# Patient Record
Sex: FEMALE | Race: WHITE | NOT HISPANIC OR LATINO | Employment: OTHER | URBAN - METROPOLITAN AREA
[De-identification: names, ages, dates, MRNs, and addresses within clinical notes are randomized per-mention and may not be internally consistent; named-entity substitution may affect disease eponyms.]

---

## 2018-02-19 ENCOUNTER — OFFICE VISIT CONVERTED (OUTPATIENT)
Dept: FAMILY MEDICINE CLINIC | Facility: CLINIC | Age: 53
End: 2018-02-19
Attending: NURSE PRACTITIONER

## 2018-10-03 ENCOUNTER — OFFICE VISIT CONVERTED (OUTPATIENT)
Dept: PULMONOLOGY | Facility: CLINIC | Age: 53
End: 2018-10-03
Attending: PHYSICIAN ASSISTANT

## 2020-02-03 ENCOUNTER — HOSPITAL ENCOUNTER (OUTPATIENT)
Dept: URGENT CARE | Facility: CLINIC | Age: 55
Discharge: HOME | End: 2020-02-03
Attending: FAMILY MEDICINE

## 2021-05-16 VITALS
HEART RATE: 80 BPM | RESPIRATION RATE: 16 BRPM | DIASTOLIC BLOOD PRESSURE: 78 MMHG | TEMPERATURE: 98 F | HEIGHT: 62 IN | SYSTOLIC BLOOD PRESSURE: 136 MMHG | OXYGEN SATURATION: 87 % | BODY MASS INDEX: 45.08 KG/M2 | WEIGHT: 245 LBS

## 2021-05-28 VITALS
TEMPERATURE: 99.1 F | DIASTOLIC BLOOD PRESSURE: 78 MMHG | SYSTOLIC BLOOD PRESSURE: 121 MMHG | HEART RATE: 72 BPM | HEIGHT: 63 IN | OXYGEN SATURATION: 93 % | RESPIRATION RATE: 16 BRPM | BODY MASS INDEX: 43.41 KG/M2 | WEIGHT: 245 LBS

## 2021-05-28 NOTE — PROGRESS NOTES
Patient: FABI IRAHETA     Acct: [id number]     Report: #[id number]  UNIT #: [id number]     : 1965    Encounter Date:10/03/2018  PRIMARY CARE: TIANA STROUD  ***Signed***  --------------------------------------------------------------------------------------------------------------------  Chief Complaint      Encounter Date      Oct 3, 2018            Primary Care Provider      TIANA STROUD            Referring Provider      TIANA STROUD            Patient Complaint      Patient is complaining of      [facility] discharge            VITALS      Height 5 ft 2.50 in / 158.75 cm      Weight 245 lbs 0 oz / 111.755434 kg      BSA 2.10 m2      BMI 44.1 kg/m2      Temperature 99.1 F / 37.28 C - Oral      Pulse 72      Respirations 16      Blood Pressure 121/78 Sitting, Right Arm      Pulse Oximetry 93%, 3 liters            HPI      The patient is a 53-year-old white female with a history of very severe COPD who    has recently had 2 back to back hospitalizations at [facility].     She was most recently seen by Dr. Infante and Dr. Kirby after presenting with     increased dyspnea, fevers, chills, and cough productive of yellow sputum. She     was treated with antibiotics and breathing treatments for a COPD exacerbation     and acute-on-chronic hypoxic and hypercapnic respiratory failure. She was     maintained on Trilogy at night. She has many allergies, many of which are     questionable including apparent allergies to steroids, many antibiotics, and     many inhalers and breathing treatments. She was eventually discharged to rehab     after refusing rehab after her previous hospitalization and tells me that she     has done fairly well. She is complaining of some mildly increased dyspnea and     coughing, but denies any purulent sputum production, denies any increased     wheezing, hemoptysis, or chills. She is only using DuoNeb q.4 hours and p.r.n.     albuterol. She was supposed to  be discharged on another week €™s worth of     ertapenem when she went to [facility], but the patient tells me she does not     know if she received that there or not. However, she did improve significantly     over the past several weeks.             I have reviewed her review of systems, medical, surgical, and family history and    agree with those as entered.            ROS      Constitutional:  Denies: Fatigue, Fever, Weight gain, Weight loss, Chills,     Insomnia, Other      Respiratory/Breathing:  Complains of: Shortness of air, Wheezing; Denies: Cough,    Hemoptysis, Pleuritic pain, Other      Endocrine:  Denies: Polydipsia, Polyuria, Heat/cold intolerance, Abnorml     menstrual pattern, Diabetes, Other      Eyes:  Denies: Blurred vision, Vision Changes, Other      Ears, nose, mouth, throat:  Denies: Mouth lesions, Thrush, Throat pain, Sarah    rseness, Allergies/Hay Fever, Post Nasal Drip, Headaches, Recent Head Injury,     Nose Bleeding, Neck Stiffness, Thyroid Mass, Hearing Loss, Ear Fullness, Dry     Mouth, Nasal or Sinus Pain, Dry Lips, Nasal discharge, Nasal congestion, Other      Cardiovascular:  Denies: Palpitations, Syncope, Claudication, Chest Pain, Wake     up Gasping for air, Leg Swelling, Irregular Heart Rate, Cyanosis, Dyspnea on     Exertion, Other      Gastrointestinal:  Denies: Nausea, Constipation, Diarrhea, Abdominal pain,     Vomiting, Difficulty Swallowing, Reflux/Heartburn, Dysphagia, Jaundice,     Bloating, Melena, Bloody stools, Other      Genitourinary:  Denies: Urinary frequency, Incontinence, Hematuria, Urgency,     Nocturia, Dysuria, Testicular problems, Other      Musculoskeletal:  Denies: Joint Pain, Joint Stiffness, Joint Swelling, Myalgias,    Other      Hematologic/lymphatic:  DENIES: Lymphadenopathy, Bruising, Bleeding tendencies,     Other      Neurological:  Denies: Headache, Numbness, Weakness, Seizures, Other      Psychiatric:  Denies: Anxiety, Appropriate Effect,  Depression, Other      Sleep:  No: Excessive daytime sleep, Morning Headache?, Snoring, Insomnia?, Stop    breathing at sleep?, Other      Integumentary:  Denies: Rash, Dry skin, Skin Warm to Touch, Other      Immunologic/Allergic:  Denies: Latex allergy, Seasonal allergies, Asthma,     Urticaria, Eczema, Other      Immunization status:  No: Up to date            FAMILY/SOCIAL/MEDICAL HX      Current History            Problems         Chronic Problems:         244.9 HYPOTHYROIDISM (Onset: 3/5/10, Acute)         OTHER MEDICAL PROVIDERS (Onset: 12/10/09, Chronic)              GI - AHMED--diarhea              NEURO-KENDRA              CARDIO-KILGORE              [facility]              HEMO-CHENEY         281.0 PERNICIOUS ANEMIA (Onset: 09)         401.9 HYPERTENSION NOS (Onset: 09, Chronic)         496 CHR AIRWAY OBSTRUCTION NEC (Onset: 09)      Surgical History:  Yes: Abdominal Surgery (DIAGNOSTIC LAPAROSCOPY), Bowel     Surgery (COLONOSCOPIES), Cholecystectomy (), Head Surgery (RIGHT     PARATHYROIDECTOMY), Orthopedic Surgery (RIGHT ROTATOR CUFF REPAIR ), Other     Surgeries; No: Appendectomy, Bladder Surgery, CABG, Oral Surgery, Vascular     Surgery      Stroke - Family Hx:  Mother, Grandparent      Heart - Family Hx:  Mother, Grandparent      Cancer/Type - Family Hx:  Mother, Father, Aunt      Other Family Medical History:  Child      Social History:  No Tobacco Use, No Alcohol Use, No Recreational Drug use      Smoking status:  Smoker, status unknown      Smoking history:  10-25 pack years (FORMER SMOKED 26-27 YRS)      Exercise Activity:  None      Whom do you live with?:  family       Section:  Yes (3)      Hysterectomy:  Yes (2001)      Anticoagulation Therapy:  No      Antibiotic Prophylaxis:  Yes      Medical History:  Yes: Anemia (PERNICIOUS), Arthritis, Asthma, Blood Disease (     PERNICIOUS ANEMIA), Chronic Bronchitis/COPD (REQUIRES CONT 2.5l O2), Congestive     Heart Failu,  Depression, Anxiety, Diabetes, Gall Stones, Hemorrhoids/Rectal Prob    (HIATAL HERNIA, gastroenteritis), Hiatal Hernia (HIATAL), High Blood Pressure,     Migrane Headaches, Mitral Valve Prolapse, Psychiatric Care, Shortness Of Breath     (lung capacity at 26%), Sinus Trouble, Skin Disease/Psoriais/Ecz, Thyroid     Problem; No: Chemotherapy/Cancer, Deafness or Ringing Ears, Seizures, Heart Att    ack, Night sweats, Miscellaneous Medical/oth            PREVENTION      Hx Influenza Vaccination:  No      Influenza Vaccine Declined:  Yes      2 or More Falls Past Year?:  Yes      Fall Past Year with Injury?:  Yes      Hx Pneumococcal Vaccination:  No (10- )      Encouraged to follow-up with:  PCP regarding preventative exams.      Chart initiated by      Maddy Van MA            ALLERGIES/MEDICATIONS      Allergies:        Coded Allergies:             BUDESONIDE (Verified  Allergy, Severe, chest pain, breathing compromised,     nauseous, 10/3/18)           FORMOTEROL FUMARATE (Verified  Allergy, Severe, chest pain, breathing     compromised, nauseous, 10/3/18)           IRON DEXTRAN COMPLEX (Verified  Allergy, Severe, RESP ARREST, 10/3/18)           OXYCODONE (Verified  Allergy, Severe, ITCHING/RASH, 10/3/18)           SULFAMETHOXAZOLE (Verified  Allergy, Severe, breathing compromised, chest     pain, nauseous, 10/3/18)           TRIMETHOPRIM (Verified  Allergy, Severe, breathing compromised, chest pain,    nauseous, 10/3/18)           HYDROCODONE (Verified  Allergy, Intermediate, RASH/ITCHING, 10/3/18)           ZAFIRLUKAST (Verified  Allergy, Unknown, 10/3/18)           CODEINE (Verified  Adverse Reaction, Severe, ITCHING/RASH, VOMITING,     10/3/18)           HYDROXYZINE (Verified  Adverse Reaction, Severe, ITCHING, 10/3/18)           VANCOMYCIN (Verified  Adverse Reaction, Severe, FLUSHING/ITCHING, 10/3/18)           PENICILLINS (Verified  Adverse Reaction, Intermediate, ITCHING/RASH,     VOMITING, 10/3/18)            ADHESIVE TAPE (Verified  Adverse Reaction, Mild, SKIN PEELS, 10/3/18)           CEPHALOSPORINS (Verified  Adverse Reaction, Mild, GI SYMPTOMS, RASH,     10/3/18)      Medications    Last Reconciled on 10/3/18 14:10 by KWADWO GUERRA      Neb-Budesonide (Pulmicort) 0.5 Mg/2 Ml Ampul.neb      0.5 MG INH BID, #60 NEB 4 Refills         Prov: Sindy Varghese PA-C         10/3/18       Arformoterol Tartrate (Brovana) 15 Mcg/2 Ml Vial.neb      15 MCG INH BID, #60 NEB 4 Refills         Prov: Sindy Varghese PA-C         10/3/18       MDI-Albuterol (Ventolin HFA*) 8 Gm Hfa.aer.ad      2 PUFFS INH Q4H PRN for SHORTNESS OF BREATH, #1 MDI 0 Refills         Reported         10/3/18       Insulin Glargine,Hum.rec.anlog (Basaglar Kwikpen U-100) 100 Unit/1 Ml Insuln.pen      5 UNITS SUBQ HS, #1 INSULN.PEN         Reported         10/3/18       Spironolactone (Spironolactone*) 25 Mg Tablet      25 MG PO BID, #60 TAB 0 Refills         Reported         10/3/18       Famotidine (Famotidine) 20 Mg Tablet      20 MG PO BID for 30 Days, #60 TAB         Prov: Michael Goncalves         9/4/18       Albuterol/Ipratropium (Duoneb) 3 Ml Ampul.neb      3 ML INH RTQ4H for 30 Days, #180 NEB         Prov: Michael Goncalves         9/4/18       Cyproheptadine HCl (Periactin) 4 Mg Tablet      8 MG PO BID, TAB         Reported         8/28/18       NEB-Albuterol Sulf (Albuterol) 2.5 Mg/0.5 Ml Vial.neb      2.5 MG INH Q4H PRN for SHORTNESS OF BREATH, #120 NEB 0 Refills         Reported         8/28/18       Cyanocobalamin (Vitamin B-12*) 500 Mcg Tablet      1000 MCG PO QDAY, TAB         Reported         8/28/18       Cholecalciferol (Vitamin D3*) 2,000 Unit Tablet      2000 UNITS PO QDAY, #30 TAB         Reported         8/28/18       Metformin Hcl* (Metformin Hcl*) 500 Mg Tablet      500 MG PO BID, #60 TAB 0 Refills         Reported         8/28/18       Spironolactone (Aldactone) 25 Mg Tablet      25 MG PO BID for 30 Days, #60 TAB         Prov:  Leia Woodson         8/20/18       Omega 3 Polyunsat Fatty Acids (Lovaza) 1 Gm Capsule      1 GM PO BID for 30 Days, #60 CAP         Prov: Leia Woodson         8/20/18       Butorphanol Tartrate (Stadol NS 1 MG/Spray) 10 Mg/1 Ml Spray      1 PUFF NARE ALT BID PRN for MIGRAINE, SPRAYS         Reported         8/10/18       Sertraline HCl (Sertraline*) 100 Mg Tablet      200 MG PO QDAY, #60 TAB 0 Refills         Reported         8/7/18       Atenolol (Atenolol*) 50 Mg Tablet      50 MG PO BID, #60 TAB 0 Refills         Reported         8/7/18       MDI-Albuterol (Ventolin HFA*) 18 Gm Hfa.aer.ad      2 PUFFS INH Q4H PRN for SHORTNESS OF BREATH, #1 INH 0 Refills         Reported         8/7/18       Lisinopril* (Lisinopril*) 10 Mg Tablet      10 MG PO QDAY, #15 TAB 0 Refills         Reported         2/6/15       Ipratropium Bromide Neb (Ipratropium Bromide Neb) 0.2 Mg/Ml Solution      0.5 MG INH RTQ6H, #120 NEB 0 Refills         Reported         2/6/15       Montelukast Sodium (Montelukast*) 10 Mg Tablet      10 MG PO QDAY, TAB         Reported         8/6/14       Multivitamins (Multiple Vitamin) 1 Tab Tablet      1 TAB PO QDAY, #30 TAB 0 Refills         Reported         8/6/14       Oxygen (OXYGEN)  Gas      2 L NS CONTINUOUS         Reported         5/20/13       Levothyroxine Sodium (Levoxyl*) 0.125 Mg Tablet      0.125 MG PO QDAY, #90 TAB         Prov: JUAN CARLOS POSADA         5/1/13       Aspirin EC (Aspirin EC*) 81 Mg Tabec      81 MG PO QDAY, #90 3 Refills         Prov: *MARIA M CALLOWAY [facility]         9/11/12       Divalproex Sodium (Depakote Sprinkle) 125 Mg Cap      375 MG PO BID         Reported         8/25/11       Acetamin/Butalbital/Caffeine (Fioricet*) 1 Tab Tablet      1 TAB PO Q6H PRN for MIGRAINE, TAB 0 Refills         Reported         11/27/09       Nitroglycerin (Nitroglycerin) 0.4 Mg Tablet      0.4 MG SL PRN, #1 TAB 0 Refills         Reported         11/27/09      Current Medications       Current Medications Reviewed 10/3/18            EXAM      CONSTITUTIONAL: Pleasant  normal conversant.      EYES : Pink conjunctive, no ptosis, PERRL.      ENMT : Nose and ears appear normal, normal dentition, mild posterior pharyngeal     wall erythema. Mallampati classification      Neck: Nontender, no masses, no thyromegaly, no nodules.      Resp : Lungs have moderately decreased breath sounds throughout without wheezes,    rhonchi, or crackles appreciated. Normal work of breathing noted.       CVS   : No carotid bruits, S1, S2 nl, RRR, no murmur, rubs or gallop, no     peripheral edema      Chest wall: Normal rise with inspiration, nontender on palpation      GI     : Abdomen soft, with no masses, no hepatosplenomegaly, no hernias, BS+      MSK   : Normal gait and station, no digital cyanosis or clubbing      Skin : No rashes, ulcerations or lesions, normal turgor and temperature      Neuro: CN II - XII intact, no sensory deficits, DTRs intact and symmetrical, no     motor weakness      Psych: Appropriate affect, A   Vtials      Vitals:             Height 5 ft 2.50 in / 158.75 cm           Weight 245 lbs 0 oz / 111.120938 kg           BSA 2.10 m2           BMI 44.1 kg/m2           Temperature 99.1 F / 37.28 C - Oral           Pulse 72           Respirations 16           Blood Pressure 121/78 Sitting, Right Arm           Pulse Oximetry 93%, 3 liters            REVIEW      Results Reviewed      PCCS Results Reviewed?:  Yes Prev Lab Results, Yes Prev Radiology Results, Yes     [facility]      Lab Results      I have personally reviewed previous lab work, imaging, and provider notes     including most recent pulmonary consultation, progress notes, and discharge     summary, as well as most recent chest imaging.            Assessment      COPD, very severe - J44.9            Recurrent pneumonia - J18.9            Chronic respiratory failure with hypoxia and hypercapnia - J96.11, J96.12            SOB  (shortness of breath) - R06.02            Noncompliance - Z91.19            Notes      New Medications      * Spironolactone (Spironolactone*) 25 MG TABLET: 25 MG PO BID #60      * INSULIN GLARGINE,HUM.REC.ANLOG (Basaglar Kwikpen U-100) 100 UNIT/1 ML       INSULN.PEN: 5 UNITS SUBQ HS #1      * MDI-Albuterol (Ventolin HFA*) 8 GM HFA.AER.AD: 2 PUFFS INH Q4H PRN SHORTNESS       OF BREATH #1      * ARFORMOTEROL TARTRATE (Brovana) 15 MCG/2 ML VIAL.NEB: 15 MCG INH BID #60         Instructions: DIAGNOSIS CODE REQUIRED PRIOR TO PRESCRIBING.      * Neb-Budesonide (Pulmicort) 0.5 MG/2 ML AMPUL.NEB: 0.5 MG INH BID #60         Instructions: DIAGNOSIS CODE REQUIRED PRIOR TO PRESCRIBING.      Discontinued Medications      * Furosemide 20 MG TABLET: 20 MG PO BID@09,17 30 Days #60      * INSULIN DETEMIR (Levemir VIAL*) 100 UNIT/1 ML VIAL: 8 UNITS SUBQ QDAY 30 Days       #1      * INSULIN LISPRO (HumaLOG VIAL*) 100 UNITS/ML VIAL: 4 UNITS SUBQ TID MEALS #1      * Ertapenem Sodium (Invanz) 1 GM VIAL: 1,000 MG IV ONCE 7 Days      New Diagnostics      * Chest 2 View, As Soon As Possible         Dx: COPD, very severe - J44.9      ASSESSMENT:      1. Very severe COPD with recent exacerbation, resolving.      2. Chronic hypoxic and hypercapnic respiratory failure on nightly Trilogy.      3. Chronic dyspnea.      4. Cough.      5. Anxiety.      6. Noncompliance with medications.      7. Chronic diastolic heart failure.            PLAN:       1. At this time, I will try to optimize the patient €™s breathing regimen by     placing her on Brovana and Pulmicort nebs b.i.d. in addition to her p.r.n.     DuoNeb and albuterol. She does have many questionable allergies to inhalers, but    tells me that she has not used Brovana and Pulmicort before. I would have     treated her with a short course of prednisone right now, given her mildly     increased coughing, but she declines that, saying that she cannot take any     prednisone or any type of  steroid.      2. I will check a chest x-ray now to ensure that she is not developing another     pneumonia. She should continue on her current diuretic regimen and as she does     not currently see a cardiologist for her diastolic heart failure, I have offered    to refer her to someone, but she declines this. She would like to research on     her own and find someone else to see as she was not happy seeing Dr. Adhikari in the    past.       3. I will have her followup with Dr. Infante at her request in the next 2 months     or sooner if needed.            Patient Education      Patient Education Provided:  COPD, How to use a Nebulizer      Time Spent:  > 50% /[facility]                 Disclaimer: Converted document may not contain table formatting or lab diagrams. Please see [employer] for the authenticated document.

## 2021-11-02 ENCOUNTER — OFFICE VISIT (OUTPATIENT)
Dept: PULMONOLOGY | Facility: CLINIC | Age: 56
End: 2021-11-02

## 2021-11-02 ENCOUNTER — TELEPHONE (OUTPATIENT)
Dept: PULMONOLOGY | Facility: CLINIC | Age: 56
End: 2021-11-02

## 2021-11-02 VITALS
TEMPERATURE: 98.7 F | WEIGHT: 210 LBS | RESPIRATION RATE: 16 BRPM | HEIGHT: 63 IN | BODY MASS INDEX: 37.21 KG/M2 | OXYGEN SATURATION: 96 %

## 2021-11-02 DIAGNOSIS — J96.11 CHRONIC RESPIRATORY FAILURE WITH HYPOXIA AND HYPERCAPNIA (HCC): ICD-10-CM

## 2021-11-02 DIAGNOSIS — J96.12 CHRONIC RESPIRATORY FAILURE WITH HYPOXIA AND HYPERCAPNIA (HCC): ICD-10-CM

## 2021-11-02 DIAGNOSIS — E66.9 OBESITY (BMI 30-39.9): ICD-10-CM

## 2021-11-02 DIAGNOSIS — J43.2 CENTRILOBULAR EMPHYSEMA (HCC): Primary | ICD-10-CM

## 2021-11-02 PROCEDURE — 99204 OFFICE O/P NEW MOD 45 MIN: CPT | Performed by: INTERNAL MEDICINE

## 2021-11-02 RX ORDER — ASPIRIN 81 MG/1
81 TABLET, CHEWABLE ORAL DAILY
COMMUNITY

## 2021-11-02 RX ORDER — ALBUTEROL SULFATE 90 UG/1
2 AEROSOL, METERED RESPIRATORY (INHALATION) EVERY 4 HOURS PRN
Qty: 18 G | Refills: 5 | Status: SHIPPED | OUTPATIENT
Start: 2021-11-02 | End: 2021-11-02 | Stop reason: SDUPTHER

## 2021-11-02 RX ORDER — ALPRAZOLAM 0.5 MG/1
1 TABLET ORAL 4 TIMES DAILY PRN
COMMUNITY
Start: 2021-08-20

## 2021-11-02 RX ORDER — ALENDRONATE SODIUM 70 MG/1
TABLET ORAL
COMMUNITY
End: 2022-11-18

## 2021-11-02 RX ORDER — BUSPIRONE HYDROCHLORIDE 5 MG/1
5 TABLET ORAL DAILY
COMMUNITY
Start: 2021-08-12 | End: 2022-11-18

## 2021-11-02 RX ORDER — SPIRONOLACTONE 25 MG/1
25 TABLET ORAL 2 TIMES DAILY
COMMUNITY
Start: 2021-07-02

## 2021-11-02 RX ORDER — ALBUTEROL SULFATE 90 UG/1
AEROSOL, METERED RESPIRATORY (INHALATION)
COMMUNITY
Start: 2021-09-07 | End: 2021-11-02 | Stop reason: ALTCHOICE

## 2021-11-02 RX ORDER — LEVOTHYROXINE SODIUM 0.12 MG/1
125 TABLET ORAL DAILY
COMMUNITY
Start: 2021-06-11

## 2021-11-02 RX ORDER — SERTRALINE HYDROCHLORIDE 100 MG/1
200 TABLET, FILM COATED ORAL DAILY
COMMUNITY
Start: 2021-08-10

## 2021-11-02 RX ORDER — TRIMETHOPRIM HYDROCHLORIDE 50 MG/5ML
10 SOLUTION ORAL
COMMUNITY
End: 2022-11-18

## 2021-11-02 RX ORDER — DOXYCYCLINE HYCLATE 100 MG/1
100 CAPSULE ORAL 2 TIMES DAILY
Qty: 20 CAPSULE | Refills: 0 | Status: SHIPPED | OUTPATIENT
Start: 2021-11-02 | End: 2022-11-04

## 2021-11-02 RX ORDER — FUROSEMIDE 20 MG/1
20 TABLET ORAL DAILY
COMMUNITY
Start: 2021-06-12

## 2021-11-02 RX ORDER — CYPROHEPTADINE HYDROCHLORIDE 4 MG/1
4 TABLET ORAL 2 TIMES DAILY
COMMUNITY

## 2021-11-02 RX ORDER — MONTELUKAST SODIUM 10 MG/1
10 TABLET ORAL DAILY
COMMUNITY
Start: 2021-06-21

## 2021-11-02 RX ORDER — DIVALPROEX SODIUM 125 MG/1
375 TABLET, DELAYED RELEASE ORAL 2 TIMES DAILY
COMMUNITY

## 2021-11-02 RX ORDER — ALBUTEROL SULFATE 90 UG/1
2 AEROSOL, METERED RESPIRATORY (INHALATION) EVERY 4 HOURS PRN
Qty: 1 G | Refills: 3 | Status: SHIPPED | OUTPATIENT
Start: 2021-11-02 | End: 2022-11-04

## 2021-11-02 RX ORDER — ATENOLOL 50 MG/1
50 TABLET ORAL 2 TIMES DAILY
COMMUNITY
Start: 2021-08-23

## 2021-11-02 RX ORDER — LISINOPRIL 10 MG/1
10 TABLET ORAL 3 TIMES DAILY PRN
COMMUNITY

## 2021-11-02 RX ORDER — CYCLOBENZAPRINE HCL 10 MG
TABLET ORAL
COMMUNITY
End: 2022-11-18

## 2021-11-02 RX ORDER — NITROGLYCERIN 0.4 MG/1
0.4 TABLET SUBLINGUAL
COMMUNITY
Start: 2021-08-13

## 2021-11-02 RX ORDER — IPRATROPIUM BROMIDE AND ALBUTEROL SULFATE 2.5; .5 MG/3ML; MG/3ML
3 SOLUTION RESPIRATORY (INHALATION) EVERY 4 HOURS
COMMUNITY
Start: 2021-09-04

## 2021-11-02 RX ORDER — INSULIN GLARGINE 100 [IU]/ML
INJECTION, SOLUTION SUBCUTANEOUS
COMMUNITY
Start: 2021-07-03 | End: 2022-11-18

## 2021-11-02 RX ORDER — ALBUTEROL SULFATE 90 UG/1
2 AEROSOL, METERED RESPIRATORY (INHALATION) EVERY 4 HOURS PRN
Qty: 1 G | Refills: 3 | Status: SHIPPED | OUTPATIENT
Start: 2021-11-02 | End: 2021-11-02 | Stop reason: SDUPTHER

## 2021-11-02 NOTE — PROGRESS NOTES
Primary Care Provider  Dana Banks APRN   Referring Provider  No ref. provider found      Patient Complaint  Establish Care (New Patient- COPD), Shortness of Breath, and Cough      Subjective          Nova Manuel presents to [facility]ULMON[facility]ARE [facility]ICINE      History of Presenting Illness  Nova Manuel is a 56 y.o. female here for follow-up for COPD and chronic respiratory failure.  She is a new patient and needs to reestablish care as she is last been seen about 38 months ago.  She was hospitalized for COPD exacerbation at that time and solace in the office.  She was supposed to follow-up with us as an outpatient and was using her trilogy machine at night.  Since then she is here to reestablish care.  She states she has not had a trilogy device in quite a while.  She had some issues with her home health services and ended up firing one of them and told them to take her trilogy machine with her.  Since then she has had worsening respiratory and CO2 retention symptoms and her current home health respiratory manager recommends that she go back on NIPPV.  She has morning headaches and excessive daytime sleepiness.  She is chronically on 2 to 3 L of oxygen.  She is short of breath walking 4 to 500 feet.  Her dyspnea is moderate severity, worse with activity and relieved with rest.  She has numerous medication allergies which makes giving her antibiotics and certain inhaler devices difficult.  She is currently only taking albuterol as needed up to a few times a day.  She is a former cigarette smoker.    She denies wheezing, headaches, chest pain, weight loss or hemoptysis. Denies fevers, chills and night sweats.  She is able to perform ADLs without difficulties and denies any swollen glands/lymph nodes in the head or neck.    I have personally reviewed the review of systems, past family, social, medical and surgical histories; and agree with their findings.      Review  of Systems  Constitutional symptoms:  Denied complaints   Ear, nose, throat: Denied complaints  Cardiovascular:  Denied complaints  Respiratory: Dyspnea, otherwise denied complaints  Gastrointestinal: Denied complaints  Musculoskeletal: Denied complaints        History reviewed. No pertinent family history.     Social History     Socioeconomic History   • Marital status: Single   Tobacco Use   • Smoking status: Former Smoker     Packs/day: 0.50     Years: 20.00     Pack years: 10.00     Quit date:      Years since quittin.8   • Smokeless tobacco: Never Used   Vaping Use   • Vaping Use: Never used   Substance and Sexual Activity   • Alcohol use: Defer   • Drug use: Defer   • Sexual activity: Defer        History reviewed. No pertinent past medical history.       There is no immunization history on file for this patient.      Allergies   Allergen Reactions   • Acetaminophen Unknown - High Severity   • Adhesive Tape Unknown - High Severity   • Baclofen Unknown - High Severity   • Budesonide-Formoterol Fumarate Unknown - High Severity   • Cephalexin Unknown - High Severity   • Cephalosporins Unknown - High Severity   • Codeine Unknown - High Severity   • Fluticasone-Salmeterol Unknown - High Severity   • Hydrocodone-Acetaminophen Unknown - High Severity   • Hydroxyzine Hcl Unknown - High Severity   • Iron Unknown - High Severity   • Oxycodone Unknown - High Severity   • Oxycodone-Acetaminophen Unknown - High Severity   • Penicillins Unknown - High Severity   • Prednisone Unknown - High Severity   • Sulfamethoxazole Unknown - High Severity   • Sulfamethoxazole-Trimethoprim Unknown - High Severity   • Tiotropium Bromide Monohydrate Unknown - Low Severity   • Trimethoprim Unknown - High Severity   • Vancomycin Unknown - High Severity   • Zafirlukast Unknown - High Severity          Current Outpatient Medications:   •  albuterol sulfate HFA (ProAir HFA) 108 (90 Base) MCG/ACT inhaler, , Disp: , Rfl:   •  ALPRAZolam  (XANAX) 0.5 MG tablet, Take 1 tablet by mouth 4 (Four) Times a Day As Needed., Disp: , Rfl:   •  ascorbic acid (VITAMIN C) 500 MG capsule controlled-release CR capsule, Vitamin C 500 mg oral capsule, extended release take 1 capsule by oral route daily   Active, Disp: , Rfl:   •  aspirin 81 MG chewable tablet, Chew 81 mg Daily., Disp: , Rfl:   •  atenolol (TENORMIN) 50 MG tablet, Take 50 mg by mouth., Disp: , Rfl:   •  Cholecalciferol 125 MCG (5000 UT) tablet, Vitamin D3 5,000 unit oral tablet take 1 tablet by oral route daily   Suspended, Disp: , Rfl:   •  cyclobenzaprine (FLEXERIL) 10 MG tablet, cyclobenzaprine 10 mg oral tablet take 1 tablet (10 mg) by oral route 3 times per day   Active, Disp: , Rfl:   •  cyproheptadine (PERIACTIN) 4 MG tablet, cyproheptadine 4 mg oral tablet take 2 tablets by oral route once a day (at bedtime)   Active, Disp: , Rfl:   •  furosemide (LASIX) 20 MG tablet, Take 20 mg by mouth Daily., Disp: , Rfl:   •  GARLIC PO, garlic 500 mg oral capsule take 1 capsule by oral route 4 times a day   Active, Disp: , Rfl:   •  Insulin Glargine (BASAGLAR KWIKPEN) 100 UNIT/ML injection pen, INJECT 13 UNITS SUBCUTANEOUS EVERY DAY, Disp: , Rfl:   •  ipratropium-albuterol (DUO-NEB) 0.5-2.5 mg/3 ml nebulizer, , Disp: , Rfl:   •  levothyroxine (SYNTHROID, LEVOTHROID) 125 MCG tablet, Take 125 mcg by mouth Daily., Disp: , Rfl:   •  lisinopril (PRINIVIL,ZESTRIL) 10 MG tablet, Take 10 mg by mouth Daily., Disp: , Rfl:   •  montelukast (SINGULAIR) 10 MG tablet, Take 10 mg by mouth Daily., Disp: , Rfl:   •  nitroglycerin (NITROSTAT) 0.4 MG SL tablet, ONE TABLET UNDER TONGUE AS NEEDED FOR CHEST PAIN, Disp: , Rfl:   •  sertraline (ZOLOFT) 100 MG tablet, Take 200 mg by mouth Daily., Disp: , Rfl:   •  spironolactone (ALDACTONE) 25 MG tablet, Take 25 mg by mouth., Disp: , Rfl:   •  albuterol sulfate  (90 Base) MCG/ACT inhaler, Inhale 2 puffs Every 4 (Four) Hours As Needed for Wheezing., Disp: 18 g, Rfl: 5  •   "alendronate (Fosamax) 70 MG tablet, Fosamax 70 mg oral tablet take 1 tablet (70 mg) by oral route once weekly in the morning, at least 30 min before first food, beverage, or medication of day   Suspended, Disp: , Rfl:   •  busPIRone (BUSPAR) 5 MG tablet, Take 5 mg by mouth Daily., Disp: , Rfl:   •  divalproex (Depakote) 125 MG DR tablet, Depakote 125 mg oral tablet,delayed release (DR/EC) take 2 tablets (250 mg) by oral route 2 times per day   Active, Disp: , Rfl:   •  doxycycline (VIBRAMYCIN) 100 MG capsule, Take 1 capsule by mouth 2 (Two) Times a Day., Disp: 20 capsule, Rfl: 0  •  POTASSIUM PO, potassium 99 mg oral tablet take 1 tablet by oral route daily   Suspended, Disp: , Rfl:   •  tiotropium (Spiriva HandiHaler) 18 MCG per inhalation capsule, , Disp: , Rfl:   •  Trimethoprim HCl (Primsol) 50 MG/5ML solution, 10 mL., Disp: , Rfl:          Objective     Vital Signs:   Temp 98.7 °F (37.1 °C) (Tympanic)   Resp 16   Ht 158.8 cm (62.5\")   Wt 95.3 kg (210 lb)   SpO2 96%   BMI 37.80 kg/m²     Physical Exam  Vital Signs Reviewed   WDWN, Alert, NAD.    HEENT:  PERRL, EOMI.  OP, nares clear  Neck:  Supple, no JVD, no thyromegaly  Chest:  good aeration, barrel chested, clear to auscultation bilaterally, tympanic to percussion bilaterally, no work of breathing noted  CV: RRR, no MGR, pulses 2+, equal.  Abd:  Soft, NT, ND, + BS, no HSM, obese  EXT:  no clubbing, no cyanosis, no edema  Neuro:  A&Ox3, CN grossly intact, no focal deficits.  Skin: No rashes or lesions noted       Result Review :   I have personally reviewed our last office note back in October 2018.  Also personally labs showing chronic hypercapnic respiratory failure.  Labs were also personally reviewed showing no peripheral eosinophilia.  I did personally reviewed her last chest x-ray which does show findings consistent with COPD.             Assessment and Plan      Patient Active Problem List   Diagnosis   • Centrilobular emphysema (HCC)   • Chronic " respiratory failure with hypoxia and hypercapnia (HCC)           Impression:  Very severe COPD without exacerbation.  Gold stage C symptoms.  Has numerous inhaler allergies and is currently only taking albuterol.  COPD assessment does score is 14 signifying borderline poor disease control  Chronic hypoxemic and hypercapnic respiratory failure on 2 to 3 L of oxygen.  Was wearing trilogy in the past and would like to go back on it.  She has symptoms of chronic hypercapnia would benefit from NIPPV nightly.  Chronic dyspnea at baseline  Tobacco use of cigarettes in remission  Obesity BMI 37.8    Plan:  Check ABG and full pulmonary function test  Patient certainly does qualify for NIPPV given history of chronic hypoxemic and hypercapnic respiratory failure, very severe COPD and symptoms of CO2 retention.  We will arrange NIPPV through her DME.  Patient will require noninvasive positive pressure ventilation for COPD and chronic respiratory failure.  Alternative modes of ventilation are insufficient due to persistent hypercapnia despite BiPAP use.  This patient requires frequent durations of ventilatory support with battery back-up and continuous enhanced along monitoring.  Intermittent support will be insufficient.  This patient quickly deteriorates without noninvasive ventilation.  Removal of ventilator may cause serious harm to the patient or frequent readmissions to the hospital.  Continue albuterol as needed as well as Spiriva HandiHaler.  Multiple nebulizer, inhaled and antibiotic allergies noted and discussed with patient  Continue home oxygen to keep SPO2 between 88-92%  Smoking status: Former smoker.  Does not need eligibility for lung cancer screening  Vaccination status: Declines vaccinations for flu, Prevnar, Pneumovax and COVID-19  Medications personally reviewed    Follow Up   Return in about 6 months (around 5/2/2022).  Patient was given instructions and counseling regarding her condition or for health  maintenance advice. Please see specific information pulled into the AVS if appropriate.     Electronically signed by Donte Mcqueen MD, 11/02/21, 1:09 PM EDT.

## 2021-11-03 DIAGNOSIS — J96.12 CHRONIC RESPIRATORY FAILURE WITH HYPOXIA AND HYPERCAPNIA (HCC): ICD-10-CM

## 2021-11-03 DIAGNOSIS — J43.2 CENTRILOBULAR EMPHYSEMA (HCC): Primary | ICD-10-CM

## 2021-11-03 DIAGNOSIS — J96.11 CHRONIC RESPIRATORY FAILURE WITH HYPOXIA AND HYPERCAPNIA (HCC): ICD-10-CM

## 2021-11-08 ENCOUNTER — HOSPITAL ENCOUNTER (OUTPATIENT)
Dept: RESPIRATORY THERAPY | Facility: HOSPITAL | Age: 56
End: 2021-11-08

## 2021-11-22 ENCOUNTER — HOSPITAL ENCOUNTER (OUTPATIENT)
Dept: RESPIRATORY THERAPY | Facility: HOSPITAL | Age: 56
Discharge: HOME | End: 2021-11-22
Admitting: INTERNAL MEDICINE

## 2021-11-22 DIAGNOSIS — J96.12 CHRONIC RESPIRATORY FAILURE WITH HYPOXIA AND HYPERCAPNIA (HCC): ICD-10-CM

## 2021-11-22 DIAGNOSIS — J96.11 CHRONIC RESPIRATORY FAILURE WITH HYPOXIA AND HYPERCAPNIA (HCC): ICD-10-CM

## 2021-11-22 DIAGNOSIS — J43.2 CENTRILOBULAR EMPHYSEMA (HCC): ICD-10-CM

## 2021-11-22 PROCEDURE — 94726 PLETHYSMOGRAPHY LUNG VOLUMES: CPT | Performed by: INTERNAL MEDICINE

## 2021-11-22 PROCEDURE — 94729 DIFFUSING CAPACITY: CPT | Performed by: INTERNAL MEDICINE

## 2021-11-22 PROCEDURE — 94726 PLETHYSMOGRAPHY LUNG VOLUMES: CPT

## 2021-11-22 PROCEDURE — 94060 EVALUATION OF WHEEZING: CPT

## 2021-11-22 PROCEDURE — 94729 DIFFUSING CAPACITY: CPT

## 2021-11-22 PROCEDURE — 94060 EVALUATION OF WHEEZING: CPT | Performed by: INTERNAL MEDICINE

## 2021-11-22 RX ORDER — ALBUTEROL SULFATE 2.5 MG/3ML
2.5 SOLUTION RESPIRATORY (INHALATION) ONCE
Status: COMPLETED | OUTPATIENT
Start: 2021-11-22 | End: 2021-11-22

## 2021-11-22 RX ADMIN — ALBUTEROL SULFATE 2.5 MG: 2.5 SOLUTION RESPIRATORY (INHALATION) at 09:47

## 2021-11-29 ENCOUNTER — TELEPHONE (OUTPATIENT)
Dept: PULMONOLOGY | Facility: CLINIC | Age: 56
End: 2021-11-29

## 2021-11-29 NOTE — TELEPHONE ENCOUNTER
PT IS NEEDING A CALL BACK ABOUT THE TESTING THAT WAS DONE ALSO  WAS GOING TO PUT HER ON A Abine MACHINE.

## 2021-12-16 ENCOUNTER — TELEPHONE (OUTPATIENT)
Dept: PULMONOLOGY | Facility: CLINIC | Age: 56
End: 2021-12-16

## 2021-12-16 NOTE — TELEPHONE ENCOUNTER
pt called wanting to knowsomething about  trilogy machine and something else reguading blood gas, pt is kind of hard to understand, please call pt back

## 2021-12-30 ENCOUNTER — LAB (OUTPATIENT)
Dept: LAB | Facility: HOSPITAL | Age: 56
End: 2021-12-30

## 2021-12-30 ENCOUNTER — TELEPHONE (OUTPATIENT)
Dept: PULMONOLOGY | Facility: CLINIC | Age: 56
End: 2021-12-30

## 2021-12-30 DIAGNOSIS — J96.11 CHRONIC RESPIRATORY FAILURE WITH HYPOXIA AND HYPERCAPNIA: ICD-10-CM

## 2021-12-30 DIAGNOSIS — J43.2 CENTRILOBULAR EMPHYSEMA: ICD-10-CM

## 2021-12-30 DIAGNOSIS — J96.12 CHRONIC RESPIRATORY FAILURE WITH HYPOXIA AND HYPERCAPNIA: ICD-10-CM

## 2021-12-30 LAB
ARTERIAL PATENCY WRIST A: POSITIVE
BASE EXCESS BLDA CALC-SCNC: 7.4 MMOL/L (ref -2–2)
BDY SITE: ABNORMAL
COHGB MFR BLD: 0.3 % (ref 0–1.5)
FHHB: 5.7 % (ref 0–5)
GAS FLOW AIRWAY: 2.5 LPM
HCO3 BLDA-SCNC: 36.1 MMOL/L (ref 22–26)
HGB BLDA-MCNC: 13.8 G/DL (ref 11.7–14.6)
INHALED O2 CONCENTRATION: 34 %
METHGB BLD QL: 0.1 % (ref 0–1.5)
MODALITY: ABNORMAL
OXYHGB MFR BLDV: 93.9 % (ref 94–99)
PCO2 BLDA: 71.1 MM HG (ref 35–45)
PH BLDA: 7.32 PH UNITS (ref 7.35–7.45)
PO2 BLD: 221 MM[HG] (ref 0–500)
PO2 BLDA: 75.2 MM HG (ref 80–100)
SAO2 % BLDCOA: 94.3 % (ref 95–99)

## 2021-12-30 PROCEDURE — 83050 HGB METHEMOGLOBIN QUAN: CPT

## 2021-12-30 PROCEDURE — 82375 ASSAY CARBOXYHB QUANT: CPT

## 2021-12-30 PROCEDURE — 36600 WITHDRAWAL OF ARTERIAL BLOOD: CPT

## 2021-12-30 PROCEDURE — 82805 BLOOD GASES W/O2 SATURATION: CPT

## 2022-01-05 ENCOUNTER — TELEPHONE (OUTPATIENT)
Dept: PULMONOLOGY | Facility: CLINIC | Age: 57
End: 2022-01-05

## 2022-01-05 NOTE — TELEPHONE ENCOUNTER
pt called wanting the results of her bloodgas and sent to [facility] from where pt gets O2 and medical equipment, att Luis Antonio [phone] office number

## 2022-02-21 ENCOUNTER — APPOINTMENT (OUTPATIENT)
Dept: RESPIRATORY THERAPY | Facility: HOSPITAL | Age: 57
End: 2022-02-21

## 2022-02-28 ENCOUNTER — TELEPHONE (OUTPATIENT)
Dept: PULMONOLOGY | Facility: CLINIC | Age: 57
End: 2022-02-28

## 2022-02-28 NOTE — TELEPHONE ENCOUNTER
Patient is needing a call back regarding her electricity for [employer] due to her oxygen if the power goes out. I sent her back to your phone but she also wanted me to send you a message, just incase you were not able to answer.

## 2022-11-03 NOTE — PROGRESS NOTES
Primary Care Provider  Dana Banks APRN   Referring Provider  No ref. provider found    Patient Complaint  Follow-up and Emphysema (Oxygen 2.5 liter nc)      SUBJECTIVE    History of Presenting Illness  Nova Manuel is a pleasant 57 y.o. female who presents to [facility]ULMON[facility] CRIT[facility]ARE MEDICINE for 1 year follow-up.  Patient is here with a friend today.  Patient has not been here since 11/2/2021 when she last saw Dr. Mcqueen.  Patient states she needs to recertify for her oxygen.  Patient does have a diagnosis of centrilobular emphysema, chronic respiratory failure hypoxia and hypercapnia.  Patient is on a NIPPV at night.  She is on O2 at 3 L via nasal cannula at this time.  Her saturations 92%.  Patient has multiple allergies and states that she cannot take many of the nebulizer inhalers due to allergic reactions.  Currently she is on DuoNeb and albuterol.  Patient states she gets these through her PCP which is [facility] which makes home visits once a month with her.  Patient states she has had recent x-rays done at [facility].  Her last pulmonary function test was 11/22/2021.  Patient states she gets very short of air with any exertion.  Patient also states that she is under the the services of hospice palliative care.  Patient is a former smoker and she refuses COVID flu and pneumonia vaccines at this time.  Patient does have a cough occasionally with production of white thick sputum and occasional asthma intermittent wheezing.  Patient states she will use her nebulizers.  She states she is not having any headaches, chest pain, weight loss or hemoptysis. Denies fevers, chills and night sweats. Nova Manuel is able to perform ADLs without difficulties and denies any swollen glands/lymph nodes in the head or neck.    I have personally reviewed the review of systems, past family, social, medical and surgical histories; and agree with their findings.    Review of Systems    Constitutional: Positive for fatigue. Negative for chills, diaphoresis, fever and unexpected weight change.   HENT: Negative.    Eyes: Negative.    Respiratory: Positive for cough, shortness of breath and wheezing.    Cardiovascular: Positive for leg swelling. Negative for chest pain and palpitations.   Musculoskeletal: Negative.         Does ambulate very little as she gets very short of air   Skin: Negative.         History reviewed. No pertinent family history.     Social History     Socioeconomic History   • Marital status: Single   Tobacco Use   • Smoking status: Former     Packs/day: 0.50     Years: 20.00     Pack years: 10.00     Types: Cigarettes     Quit date: 2007     Years since quitting: 15.8   • Smokeless tobacco: Never   Vaping Use   • Vaping Use: Never used   Substance and Sexual Activity   • Alcohol use: Defer   • Drug use: Defer   • Sexual activity: Defer        History reviewed. No pertinent past medical history.       There is no immunization history on file for this patient.    Allergies   Allergen Reactions   • Acetaminophen Unknown - High Severity   • Adhesive Tape Unknown - High Severity   • Baclofen Unknown - High Severity   • Budesonide-Formoterol Fumarate Unknown - High Severity   • Cephalexin Unknown - High Severity   • Cephalosporins Unknown - High Severity   • Codeine Unknown - High Severity   • Fluticasone-Salmeterol Unknown - High Severity   • Hydrocodone-Acetaminophen Unknown - High Severity   • Hydroxyzine Hcl Unknown - High Severity   • Iron Unknown - High Severity   • Oxycodone Unknown - High Severity   • Oxycodone-Acetaminophen Unknown - High Severity   • Penicillins Unknown - High Severity   • Prednisone Unknown - High Severity   • Sulfamethoxazole Unknown - High Severity   • Sulfamethoxazole-Trimethoprim Unknown - High Severity   • Tiotropium Bromide Monohydrate Unknown - Low Severity   • Trimethoprim Unknown - High Severity   • Vancomycin Unknown - High Severity   •  Zafirlukast Unknown - High Severity          Current Outpatient Medications:   •  alendronate (FOSAMAX) 70 MG tablet, Fosamax 70 mg oral tablet take 1 tablet (70 mg) by oral route once weekly in the morning, at least 30 min before first food, beverage, or medication of day   Suspended, Disp: , Rfl:   •  ALPRAZolam (XANAX) 0.5 MG tablet, Take 1 tablet by mouth 4 (Four) Times a Day As Needed., Disp: , Rfl:   •  ascorbic acid (VITAMIN C) 500 MG capsule controlled-release CR capsule, Vitamin C 500 mg oral capsule, extended release take 1 capsule by oral route daily   Active, Disp: , Rfl:   •  aspirin 81 MG chewable tablet, Chew 81 mg Daily., Disp: , Rfl:   •  atenolol (TENORMIN) 50 MG tablet, Take 50 mg by mouth., Disp: , Rfl:   •  Cholecalciferol 125 MCG (5000 UT) tablet, Vitamin D3 5,000 unit oral tablet take 1 tablet by oral route daily   Suspended, Disp: , Rfl:   •  cyproheptadine (PERIACTIN) 4 MG tablet, cyproheptadine 4 mg oral tablet take 2 tablets by oral route once a day (at bedtime)   Active, Disp: , Rfl:   •  divalproex (DEPAKOTE) 125 MG DR tablet, Depakote 125 mg oral tablet,delayed release (DR/EC) take 2 tablets (250 mg) by oral route 2 times per day   Active, Disp: , Rfl:   •  FREESTYLE LITE test strip, USE AS DIRECTED TO TEST BLOOD SUGAR TWICE DAILY, Disp: , Rfl:   •  furosemide (LASIX) 20 MG tablet, Take 20 mg by mouth Daily., Disp: , Rfl:   •  GARLIC PO, garlic 500 mg oral capsule take 1 capsule by oral route 4 times a day   Active, Disp: , Rfl:   •  ipratropium-albuterol (DUO-NEB) 0.5-2.5 mg/3 ml nebulizer, , Disp: , Rfl:   •  Lancets (freestyle) lancets, USE AS DIRECTED TO TEST BLOOD SUGAR TWICE DAILY, Disp: , Rfl:   •  Lantus SoloStar 100 UNIT/ML injection pen, , Disp: , Rfl:   •  levothyroxine (SYNTHROID, LEVOTHROID) 125 MCG tablet, Take 125 mcg by mouth Daily., Disp: , Rfl:   •  lisinopril (PRINIVIL,ZESTRIL) 10 MG tablet, Take 10 mg by mouth Daily., Disp: , Rfl:   •  montelukast (SINGULAIR) 10 MG  "tablet, Take 10 mg by mouth Daily., Disp: , Rfl:   •  nitroglycerin (NITROSTAT) 0.4 MG SL tablet, ONE TABLET UNDER TONGUE AS NEEDED FOR CHEST PAIN, Disp: , Rfl:   •  sertraline (ZOLOFT) 100 MG tablet, Take 200 mg by mouth Daily., Disp: , Rfl:   •  spironolactone (ALDACTONE) 25 MG tablet, Take 25 mg by mouth., Disp: , Rfl:   •  B-D ULTRAFINE III SHORT PEN 31G X 8 MM misc, USE TO INJECT BASAGLAR EVERY DAY, Disp: , Rfl:   •  busPIRone (BUSPAR) 5 MG tablet, Take 5 mg by mouth Daily., Disp: , Rfl:   •  cyclobenzaprine (FLEXERIL) 10 MG tablet, cyclobenzaprine 10 mg oral tablet take 1 tablet (10 mg) by oral route 3 times per day   Active, Disp: , Rfl:   •  Insulin Glargine (BASAGLAR KWIKPEN) 100 UNIT/ML injection pen, INJECT 13 UNITS SUBCUTANEOUS EVERY DAY, Disp: , Rfl:   •  POTASSIUM PO, potassium 99 mg oral tablet take 1 tablet by oral route daily   Suspended, Disp: , Rfl:   •  Trimethoprim HCl (Primsol) 50 MG/5ML solution, 10 mL., Disp: , Rfl:        OBJECTIVE    Vital Signs   BP 99/44 (BP Location: Right arm, Patient Position: Sitting, Cuff Size: Adult)   Pulse 68   Temp 97.8 °F (36.6 °C) (Tympanic)   Resp 18   Ht 158.8 cm (62.5\")   Wt 96.8 kg (213 lb 6.4 oz)   SpO2 92% Comment: 2.5 liter nc  BMI 38.41 kg/m²     Physical Exam  Vitals reviewed.   Constitutional:       General: She is not in acute distress.     Appearance: Normal appearance. She is obese. She is ill-appearing.   HENT:      Head: Normocephalic and atraumatic.      Nose: Nose normal.      Mouth/Throat:      Mouth: Mucous membranes are moist.      Pharynx: Oropharynx is clear.   Eyes:      Extraocular Movements: Extraocular movements intact.      Conjunctiva/sclera: Conjunctivae normal.      Pupils: Pupils are equal, round, and reactive to light.   Cardiovascular:      Rate and Rhythm: Normal rate and regular rhythm.      Pulses: Normal pulses.      Heart sounds: Normal heart sounds.   Pulmonary:      Effort: Pulmonary effort is normal. No " respiratory distress.      Breath sounds: Wheezing and rhonchi present.      Comments: Very diminished breath sounds bilateral lung fields with an occasional wheeze and rhonchi in the bases.  Abdominal:      General: Bowel sounds are normal.   Musculoskeletal:         General: Normal range of motion.      Cervical back: Normal range of motion and neck supple.      Right lower leg: Edema present.      Left lower leg: Edema present.   Lymphadenopathy:      Cervical: No cervical adenopathy.   Skin:     General: Skin is warm and dry.   Neurological:      General: No focal deficit present.      Mental Status: She is alert and oriented to person, place, and time.   Psychiatric:         Behavior: Behavior normal.          Results Review  I have personally reviewed the prior office note of Dr. Mcqueen and pulmonary function test from 11/22/2021 with the following:      Full Pulmonary Function Test With Bronchodilator: Patient Communication     Release   Not seen     Results  Full Pulmonary Function Test With Bronchodilator (Order [id number])  Order-Level Documents:    Scan on 11/22/2021 by Donte Mcqueen MD: PULMONARY FUNCTION TEST, [facility], 11/22/2021         Author: -- Service: -- Author Type: --   Filed:  Date of Service:  Creation Time:    Status: (Other)   Pulmonary function test     Spirometry:  Very severe obstructive lung defect noted.  FEV1 is 0.34 L / 13% predicted. No bronchodilator response was noted.  Flow volume loop shows mixed defect.     Lung volumes:  Very severe restrictive lung defect noted. Total lung capacity 1.13 L / 23% predicted. Lung volumes done via nitrogen washout testing.     Diffusion capacity:  Diffuse capacity severely reduced at 8% predicted     Overall impression:  Very severe mixed obstructive restrictive lung defect. No bronchodilator response noted. Diffuse capacity severely reduced. Compared to testing done in April 2016, FEV1 has severely decreased by 34% and FVC has severely  decreased by 29%. Diffuse capacity severely decreased by 75%.     Electronically signed by Donte Mcqueen MD, 11/29/21, 7:57 AM EST.           Order   Full Pulmonary Function Test With Bronchodilator (Order # [id number]) on 11/03/2021   View Encounter        Clinical Indications     ICD-10-CM ICD-9-CM   Centrilobular emphysema (HCC)     J43.2 492.8   Chronic respiratory failure with hypoxia and hypercapnia (HCC)     J96.11  J96.12 518.83  799.02  786.09     Signed    Electronically signed by Donte Mcqueen MD on 11/29/21 at 0757 EST     Additional Information    Specimen ID Bill Type Client ID   [id number]              Specimen Date Taken Specimen Time Taken Specimen Received Date Specimen Received Time Result Date Result Time       Nov 29, 2021  7:55 AM     Results Routing Tracking    View Results Routing Information     Order Report     Order Details      ASSESSMENT & PLAN    Patient Active Problem List   Diagnosis   • Centrilobular emphysema (HCC)   • Chronic respiratory failure with hypoxia and hypercapnia (HCC)       Diagnoses and all orders for this visit:    1. Centrilobular emphysema (HCC) (Primary)  -     6 Minute Walk Test; Future    2. Chronic respiratory failure with hypoxia and hypercapnia (HCC)  -     6 Minute Walk Test; Future    3. Obesity (BMI 30-39.9)           Plan:  6-minute walk performed in office today with patient failing within 2 minutes saturation dropping to 88%. Will recertify patient for oxygen today.  Patient to continue with her NIPPV settings as recommended.  Patient will follow-up in 6 months or sooner if needed.    Smoking status:former  Vaccination status:refused  Medications personally reviewed    Follow Up  Return in about 6 months (around 5/4/2023).    Patient was given instructions and counseling regarding her condition or for health maintenance advice. Please see specific information pulled into the AVS if appropriate.

## 2022-11-04 ENCOUNTER — OFFICE VISIT (OUTPATIENT)
Dept: PULMONOLOGY | Facility: CLINIC | Age: 57
End: 2022-11-04

## 2022-11-04 VITALS
BODY MASS INDEX: 37.81 KG/M2 | TEMPERATURE: 97.8 F | HEIGHT: 63 IN | WEIGHT: 213.4 LBS | HEART RATE: 68 BPM | SYSTOLIC BLOOD PRESSURE: 99 MMHG | OXYGEN SATURATION: 92 % | RESPIRATION RATE: 18 BRPM | DIASTOLIC BLOOD PRESSURE: 44 MMHG

## 2022-11-04 DIAGNOSIS — J96.11 CHRONIC RESPIRATORY FAILURE WITH HYPOXIA AND HYPERCAPNIA: ICD-10-CM

## 2022-11-04 DIAGNOSIS — E66.9 OBESITY (BMI 30-39.9): ICD-10-CM

## 2022-11-04 DIAGNOSIS — J43.2 CENTRILOBULAR EMPHYSEMA: Primary | ICD-10-CM

## 2022-11-04 DIAGNOSIS — J96.12 CHRONIC RESPIRATORY FAILURE WITH HYPOXIA AND HYPERCAPNIA: ICD-10-CM

## 2022-11-04 PROCEDURE — 99213 OFFICE O/P EST LOW 20 MIN: CPT | Performed by: NURSE PRACTITIONER

## 2022-11-04 RX ORDER — BLOOD-GLUCOSE METER
KIT MISCELLANEOUS
COMMUNITY
Start: 2022-10-12 | End: 2022-11-18

## 2022-11-04 RX ORDER — INSULIN GLARGINE 100 [IU]/ML
13 INJECTION, SOLUTION SUBCUTANEOUS DAILY
COMMUNITY
Start: 2022-11-02

## 2022-11-04 RX ORDER — PEN NEEDLE, DIABETIC 31 GX5/16"
NEEDLE, DISPOSABLE MISCELLANEOUS
COMMUNITY
Start: 2022-08-24 | End: 2022-11-18

## 2022-11-04 RX ORDER — LANCETS 28 GAUGE
EACH MISCELLANEOUS
COMMUNITY
Start: 2022-08-30 | End: 2022-11-18

## 2022-11-07 ENCOUNTER — TELEPHONE (OUTPATIENT)
Dept: PULMONOLOGY | Facility: CLINIC | Age: 57
End: 2022-11-07

## 2022-11-07 NOTE — TELEPHONE ENCOUNTER
Received a message from [employer] with [employer] (?) regarding patient and a prescription for oxygen. Please call [employer] back at [phone].

## 2022-11-09 NOTE — TELEPHONE ENCOUNTER
This number is not in service.  I tried calling the pt aids number I have and there is now one there by that name.

## 2022-11-18 ENCOUNTER — HOSPITAL ENCOUNTER (INPATIENT)
Facility: HOSPITAL | Age: 57
LOS: 25 days | Discharge: HOME | End: 2022-12-13
Attending: EMERGENCY MEDICINE

## 2022-11-18 ENCOUNTER — APPOINTMENT (OUTPATIENT)
Dept: GENERAL RADIOLOGY | Facility: HOSPITAL | Age: 57
End: 2022-11-18

## 2022-11-18 DIAGNOSIS — J96.22 ACUTE ON CHRONIC RESPIRATORY FAILURE WITH HYPOXIA AND HYPERCAPNIA: Primary | ICD-10-CM

## 2022-11-18 DIAGNOSIS — J96.21 ACUTE ON CHRONIC RESPIRATORY FAILURE WITH HYPOXIA AND HYPERCAPNIA: Primary | ICD-10-CM

## 2022-11-18 DIAGNOSIS — R13.12 OROPHARYNGEAL DYSPHAGIA: ICD-10-CM

## 2022-11-18 DIAGNOSIS — R26.2 DIFFICULTY WALKING: ICD-10-CM

## 2022-11-18 DIAGNOSIS — J44.1 COPD WITH ACUTE EXACERBATION: ICD-10-CM

## 2022-11-18 DIAGNOSIS — Z78.9 DECREASED ACTIVITIES OF DAILY LIVING (ADL): ICD-10-CM

## 2022-11-18 LAB
ALBUMIN SERPL-MCNC: 3.8 G/DL (ref 3.5–5.2)
ALBUMIN/GLOB SERPL: 1.2 G/DL
ALP SERPL-CCNC: 97 U/L (ref 39–117)
ALT SERPL W P-5'-P-CCNC: 56 U/L (ref 1–33)
ANION GAP SERPL CALCULATED.3IONS-SCNC: 9.8 MMOL/L (ref 5–15)
ARTERIAL PATENCY WRIST A: ABNORMAL
AST SERPL-CCNC: 40 U/L (ref 1–32)
BASE EXCESS BLDA CALC-SCNC: 5.7 MMOL/L (ref -2–2)
BASOPHILS # BLD AUTO: 0.01 10*3/MM3 (ref 0–0.2)
BASOPHILS NFR BLD AUTO: 0.1 % (ref 0–1.5)
BDY SITE: ABNORMAL
BILIRUB SERPL-MCNC: 0.3 MG/DL (ref 0–1.2)
BUN SERPL-MCNC: 18 MG/DL (ref 6–20)
BUN/CREAT SERPL: 24.7 (ref 7–25)
CA-I BLDA-SCNC: 1.29 MMOL/L (ref 1.13–1.32)
CALCIUM SPEC-SCNC: 9.9 MG/DL (ref 8.6–10.5)
CHLORIDE BLDA-SCNC: 94 MMOL/L (ref 98–106)
CHLORIDE SERPL-SCNC: 94 MMOL/L (ref 98–107)
CO2 SERPL-SCNC: 33.2 MMOL/L (ref 22–29)
COHGB MFR BLD: 1.1 % (ref 0–1.5)
CREAT SERPL-MCNC: 0.73 MG/DL (ref 0.57–1)
DEPRECATED RDW RBC AUTO: 46.9 FL (ref 37–54)
EGFRCR SERPLBLD CKD-EPI 2021: 96.1 ML/MIN/1.73
EOSINOPHIL # BLD AUTO: 0 10*3/MM3 (ref 0–0.4)
EOSINOPHIL NFR BLD AUTO: 0 % (ref 0.3–6.2)
ERYTHROCYTE [DISTWIDTH] IN BLOOD BY AUTOMATED COUNT: 13.1 % (ref 12.3–15.4)
FHHB: 8.4 % (ref 0–5)
GAS FLOW AIRWAY: ABNORMAL L/MIN
GLOBULIN UR ELPH-MCNC: 3.3 GM/DL
GLUCOSE BLDA-MCNC: 131 MG/DL (ref 65–99)
GLUCOSE SERPL-MCNC: 129 MG/DL (ref 65–99)
HCO3 BLDA-SCNC: 35.5 MMOL/L (ref 22–26)
HCT VFR BLD AUTO: 44.7 % (ref 34–46.6)
HGB BLD-MCNC: 14.4 G/DL (ref 12–15.9)
HGB BLDA-MCNC: 14.8 G/DL (ref 11.7–14.6)
HOLD SPECIMEN: NORMAL
HOLD SPECIMEN: NORMAL
IMM GRANULOCYTES # BLD AUTO: 0.05 10*3/MM3 (ref 0–0.05)
IMM GRANULOCYTES NFR BLD AUTO: 0.7 % (ref 0–0.5)
INHALED O2 CONCENTRATION: 60 %
LACTATE BLDA-SCNC: 0.89 MMOL/L (ref 0.5–2)
LYMPHOCYTES # BLD AUTO: 0.86 10*3/MM3 (ref 0.7–3.1)
LYMPHOCYTES NFR BLD AUTO: 12.6 % (ref 19.6–45.3)
MCH RBC QN AUTO: 31.2 PG (ref 26.6–33)
MCHC RBC AUTO-ENTMCNC: 32.2 G/DL (ref 31.5–35.7)
MCV RBC AUTO: 97 FL (ref 79–97)
METHGB BLD QL: 0.2 % (ref 0–1.5)
MODALITY: ABNORMAL
MONOCYTES # BLD AUTO: 0.42 10*3/MM3 (ref 0.1–0.9)
MONOCYTES NFR BLD AUTO: 6.1 % (ref 5–12)
NEUTROPHILS NFR BLD AUTO: 5.51 10*3/MM3 (ref 1.7–7)
NEUTROPHILS NFR BLD AUTO: 80.5 % (ref 42.7–76)
NOTE: ABNORMAL
NRBC BLD AUTO-RTO: 0 /100 WBC (ref 0–0.2)
NT-PROBNP SERPL-MCNC: 2790 PG/ML (ref 0–900)
OXYHGB MFR BLDV: 90.3 % (ref 94–99)
PCO2 BLDA: 77.8 MM HG (ref 35–45)
PH BLDA: 7.28 PH UNITS (ref 7.35–7.45)
PLATELET # BLD AUTO: 156 10*3/MM3 (ref 140–450)
PMV BLD AUTO: 11.9 FL (ref 6–12)
PO2 BLD: 104 MM[HG] (ref 0–500)
PO2 BLDA: 62.5 MM HG (ref 80–100)
POTASSIUM BLDA-SCNC: 4.48 MMOL/L (ref 3.5–5)
POTASSIUM SERPL-SCNC: 4.4 MMOL/L (ref 3.5–5.2)
PROT SERPL-MCNC: 7.1 G/DL (ref 6–8.5)
RBC # BLD AUTO: 4.61 10*6/MM3 (ref 3.77–5.28)
SAO2 % BLDCOA: 91.5 % (ref 95–99)
SODIUM BLDA-SCNC: 136.2 MMOL/L (ref 136–146)
SODIUM SERPL-SCNC: 137 MMOL/L (ref 136–145)
TROPONIN T SERPL-MCNC: <0.01 NG/ML (ref 0–0.03)
WBC NRBC COR # BLD: 6.85 10*3/MM3 (ref 3.4–10.8)
WHOLE BLOOD HOLD COAG: NORMAL
WHOLE BLOOD HOLD SPECIMEN: NORMAL

## 2022-11-18 PROCEDURE — 94640 AIRWAY INHALATION TREATMENT: CPT

## 2022-11-18 PROCEDURE — 85025 COMPLETE CBC W/AUTO DIFF WBC: CPT | Performed by: EMERGENCY MEDICINE

## 2022-11-18 PROCEDURE — 99252 IP/OBS CONSLTJ NEW/EST SF 35: CPT | Performed by: INTERNAL MEDICINE

## 2022-11-18 PROCEDURE — 36600 WITHDRAWAL OF ARTERIAL BLOOD: CPT | Performed by: EMERGENCY MEDICINE

## 2022-11-18 PROCEDURE — 36415 COLL VENOUS BLD VENIPUNCTURE: CPT

## 2022-11-18 PROCEDURE — 94799 UNLISTED PULMONARY SVC/PX: CPT

## 2022-11-18 PROCEDURE — 83050 HGB METHEMOGLOBIN QUAN: CPT | Performed by: EMERGENCY MEDICINE

## 2022-11-18 PROCEDURE — 80053 COMPREHEN METABOLIC PANEL: CPT | Performed by: EMERGENCY MEDICINE

## 2022-11-18 PROCEDURE — 82375 ASSAY CARBOXYHB QUANT: CPT | Performed by: EMERGENCY MEDICINE

## 2022-11-18 PROCEDURE — 99285 EMERGENCY DEPT VISIT HI MDM: CPT

## 2022-11-18 PROCEDURE — 25010000002 FUROSEMIDE PER 20 MG: Performed by: EMERGENCY MEDICINE

## 2022-11-18 PROCEDURE — 93005 ELECTROCARDIOGRAM TRACING: CPT | Performed by: EMERGENCY MEDICINE

## 2022-11-18 PROCEDURE — 94660 CPAP INITIATION&MGMT: CPT

## 2022-11-18 PROCEDURE — 82805 BLOOD GASES W/O2 SATURATION: CPT | Performed by: EMERGENCY MEDICINE

## 2022-11-18 PROCEDURE — 71045 X-RAY EXAM CHEST 1 VIEW: CPT

## 2022-11-18 PROCEDURE — 84484 ASSAY OF TROPONIN QUANT: CPT | Performed by: EMERGENCY MEDICINE

## 2022-11-18 PROCEDURE — 83880 ASSAY OF NATRIURETIC PEPTIDE: CPT | Performed by: EMERGENCY MEDICINE

## 2022-11-18 RX ORDER — L.ACIDOPH/B.ANIMALIS/B.LONGUM 15B CELL
1 CAPSULE ORAL DAILY
COMMUNITY

## 2022-11-18 RX ORDER — IPRATROPIUM BROMIDE AND ALBUTEROL SULFATE 2.5; .5 MG/3ML; MG/3ML
3 SOLUTION RESPIRATORY (INHALATION)
Status: COMPLETED | OUTPATIENT
Start: 2022-11-18 | End: 2022-11-18

## 2022-11-18 RX ORDER — CHLORAL HYDRATE 500 MG
1000 CAPSULE ORAL 2 TIMES DAILY
COMMUNITY

## 2022-11-18 RX ORDER — LEVOTHYROXINE SODIUM 0.12 MG/1
125 TABLET ORAL
Status: DISCONTINUED | OUTPATIENT
Start: 2022-11-19 | End: 2022-11-25

## 2022-11-18 RX ORDER — ATENOLOL 50 MG/1
50 TABLET ORAL
Status: DISCONTINUED | OUTPATIENT
Start: 2022-11-19 | End: 2022-11-25

## 2022-11-18 RX ORDER — MULTIVIT-MIN/IRON/FOLIC ACID/K 18-600-40
2000 CAPSULE ORAL DAILY
COMMUNITY

## 2022-11-18 RX ORDER — MONTELUKAST SODIUM 10 MG/1
10 TABLET ORAL DAILY
Status: DISCONTINUED | OUTPATIENT
Start: 2022-11-19 | End: 2022-11-25

## 2022-11-18 RX ORDER — BUTALBITAL, ACETAMINOPHEN AND CAFFEINE 50; 325; 40 MG/1; MG/1; MG/1
1 TABLET ORAL EVERY 4 HOURS PRN
COMMUNITY

## 2022-11-18 RX ORDER — SERTRALINE HYDROCHLORIDE 100 MG/1
200 TABLET, FILM COATED ORAL DAILY
Status: DISCONTINUED | OUTPATIENT
Start: 2022-11-19 | End: 2022-11-25

## 2022-11-18 RX ORDER — SPIRONOLACTONE 25 MG/1
25 TABLET ORAL DAILY
Status: DISCONTINUED | OUTPATIENT
Start: 2022-11-19 | End: 2022-11-25

## 2022-11-18 RX ORDER — DIVALPROEX SODIUM 250 MG/1
250 TABLET, DELAYED RELEASE ORAL EVERY 12 HOURS SCHEDULED
Status: DISCONTINUED | OUTPATIENT
Start: 2022-11-18 | End: 2022-11-25 | Stop reason: CLARIF

## 2022-11-18 RX ORDER — BUTORPHANOL TARTRATE 10 MG/ML
1 SPRAY, METERED NASAL 2 TIMES DAILY PRN
COMMUNITY

## 2022-11-18 RX ORDER — BUSPIRONE HYDROCHLORIDE 5 MG/1
5 TABLET ORAL DAILY
Status: DISCONTINUED | OUTPATIENT
Start: 2022-11-19 | End: 2022-11-25

## 2022-11-18 RX ORDER — IPRATROPIUM BROMIDE AND ALBUTEROL SULFATE 2.5; .5 MG/3ML; MG/3ML
3 SOLUTION RESPIRATORY (INHALATION)
Status: DISCONTINUED | OUTPATIENT
Start: 2022-11-18 | End: 2022-12-13 | Stop reason: HOSPADM

## 2022-11-18 RX ORDER — NICOTINE POLACRILEX 4 MG
15 LOZENGE BUCCAL
Status: DISCONTINUED | OUTPATIENT
Start: 2022-11-18 | End: 2022-11-25

## 2022-11-18 RX ORDER — ALBUTEROL SULFATE 2.5 MG/3ML
2.5 SOLUTION RESPIRATORY (INHALATION) EVERY 4 HOURS PRN
COMMUNITY
End: 2023-01-05

## 2022-11-18 RX ORDER — DEXTROSE MONOHYDRATE 25 G/50ML
25 INJECTION, SOLUTION INTRAVENOUS
Status: DISCONTINUED | OUTPATIENT
Start: 2022-11-18 | End: 2022-12-01

## 2022-11-18 RX ORDER — FUROSEMIDE 10 MG/ML
40 INJECTION INTRAMUSCULAR; INTRAVENOUS ONCE
Status: COMPLETED | OUTPATIENT
Start: 2022-11-18 | End: 2022-11-18

## 2022-11-18 RX ORDER — LISINOPRIL 10 MG/1
10 TABLET ORAL DAILY
Status: DISCONTINUED | OUTPATIENT
Start: 2022-11-19 | End: 2022-11-25

## 2022-11-18 RX ORDER — SODIUM CHLORIDE 0.9 % (FLUSH) 0.9 %
10 SYRINGE (ML) INJECTION AS NEEDED
Status: DISCONTINUED | OUTPATIENT
Start: 2022-11-18 | End: 2022-12-13 | Stop reason: HOSPADM

## 2022-11-18 RX ORDER — MULTIPLE VITAMINS W/ MINERALS TAB 9MG-400MCG
1 TAB ORAL DAILY
COMMUNITY

## 2022-11-18 RX ORDER — ALPRAZOLAM 0.25 MG/1
0.5 TABLET ORAL 4 TIMES DAILY PRN
Status: DISCONTINUED | OUTPATIENT
Start: 2022-11-18 | End: 2022-11-19

## 2022-11-18 RX ORDER — ASPIRIN 81 MG/1
81 TABLET, CHEWABLE ORAL DAILY
Status: DISCONTINUED | OUTPATIENT
Start: 2022-11-19 | End: 2022-11-25

## 2022-11-18 RX ORDER — LANOLIN ALCOHOL/MO/W.PET/CERES
1000 CREAM (GRAM) TOPICAL DAILY
COMMUNITY

## 2022-11-18 RX ADMIN — IPRATROPIUM BROMIDE AND ALBUTEROL SULFATE 3 ML: 2.5; .5 SOLUTION RESPIRATORY (INHALATION) at 22:28

## 2022-11-18 RX ADMIN — IPRATROPIUM BROMIDE AND ALBUTEROL SULFATE 3 ML: 2.5; .5 SOLUTION RESPIRATORY (INHALATION) at 22:34

## 2022-11-18 RX ADMIN — FUROSEMIDE 40 MG: 10 INJECTION, SOLUTION INTRAMUSCULAR; INTRAVENOUS at 23:25

## 2022-11-18 RX ADMIN — IPRATROPIUM BROMIDE AND ALBUTEROL SULFATE 3 ML: 2.5; .5 SOLUTION RESPIRATORY (INHALATION) at 22:33

## 2022-11-19 PROBLEM — J30.9 ALLERGIC RHINITIS: Status: ACTIVE | Noted: 2022-11-19

## 2022-11-19 PROBLEM — F41.9 ANXIETY: Status: ACTIVE | Noted: 2022-11-19

## 2022-11-19 PROBLEM — G43.909 MIGRAINE: Status: ACTIVE | Noted: 2022-11-19

## 2022-11-19 PROBLEM — D51.0 ANEMIA, PERNICIOUS: Status: ACTIVE | Noted: 2022-11-19

## 2022-11-19 PROBLEM — J98.4 RESTRICTIVE LUNG DISEASE: Status: ACTIVE | Noted: 2022-11-19

## 2022-11-19 PROBLEM — I34.1 MITRAL VALVE PROLAPSE: Status: ACTIVE | Noted: 2022-11-19

## 2022-11-19 PROBLEM — E11.9 DIABETES: Status: ACTIVE | Noted: 2022-11-19

## 2022-11-19 PROBLEM — E66.01 MORBID OBESITY WITH BODY MASS INDEX OF 40.0-44.9 IN ADULT: Status: ACTIVE | Noted: 2021-09-08

## 2022-11-19 PROBLEM — J44.1 COPD WITH ACUTE EXACERBATION: Status: ACTIVE | Noted: 2022-11-19

## 2022-11-19 PROBLEM — J45.909 ASTHMA: Status: ACTIVE | Noted: 2022-11-19

## 2022-11-19 LAB
GLUCOSE BLDC GLUCOMTR-MCNC: 113 MG/DL (ref 70–99)
GLUCOSE BLDC GLUCOMTR-MCNC: 114 MG/DL (ref 70–99)
GLUCOSE BLDC GLUCOMTR-MCNC: 134 MG/DL (ref 70–99)
GLUCOSE BLDC GLUCOMTR-MCNC: 167 MG/DL (ref 70–99)
GLUCOSE BLDC GLUCOMTR-MCNC: 174 MG/DL (ref 70–99)
QT INTERVAL: 373 MS

## 2022-11-19 PROCEDURE — 94799 UNLISTED PULMONARY SVC/PX: CPT

## 2022-11-19 PROCEDURE — 99223 1ST HOSP IP/OBS HIGH 75: CPT | Performed by: INTERNAL MEDICINE

## 2022-11-19 PROCEDURE — 94761 N-INVAS EAR/PLS OXIMETRY MLT: CPT

## 2022-11-19 PROCEDURE — 94760 N-INVAS EAR/PLS OXIMETRY 1: CPT

## 2022-11-19 PROCEDURE — 25010000002 METHYLPREDNISOLONE PER 40 MG: Performed by: INTERNAL MEDICINE

## 2022-11-19 PROCEDURE — 99232 SBSQ HOSP IP/OBS MODERATE 35: CPT | Performed by: INTERNAL MEDICINE

## 2022-11-19 PROCEDURE — 25010000002 ENOXAPARIN PER 10 MG: Performed by: INTERNAL MEDICINE

## 2022-11-19 PROCEDURE — 94660 CPAP INITIATION&MGMT: CPT

## 2022-11-19 PROCEDURE — 82962 GLUCOSE BLOOD TEST: CPT

## 2022-11-19 PROCEDURE — 63710000001 INSULIN REGULAR HUMAN PER 5 UNITS: Performed by: INTERNAL MEDICINE

## 2022-11-19 PROCEDURE — 25010000002 ONDANSETRON PER 1 MG: Performed by: INTERNAL MEDICINE

## 2022-11-19 RX ORDER — BUTALBITAL, ACETAMINOPHEN AND CAFFEINE 300; 40; 50 MG/1; MG/1; MG/1
1 CAPSULE ORAL EVERY 6 HOURS PRN
Status: DISCONTINUED | OUTPATIENT
Start: 2022-11-19 | End: 2022-11-25

## 2022-11-19 RX ORDER — ALPRAZOLAM 0.25 MG/1
0.5 TABLET ORAL 3 TIMES DAILY PRN
Status: DISCONTINUED | OUTPATIENT
Start: 2022-11-19 | End: 2022-11-25

## 2022-11-19 RX ORDER — ENOXAPARIN SODIUM 100 MG/ML
40 INJECTION SUBCUTANEOUS DAILY
Status: DISCONTINUED | OUTPATIENT
Start: 2022-11-20 | End: 2022-11-23

## 2022-11-19 RX ORDER — ONDANSETRON 2 MG/ML
4 INJECTION INTRAMUSCULAR; INTRAVENOUS EVERY 4 HOURS PRN
Status: DISCONTINUED | OUTPATIENT
Start: 2022-11-19 | End: 2022-12-13 | Stop reason: HOSPADM

## 2022-11-19 RX ORDER — ENOXAPARIN SODIUM 100 MG/ML
40 INJECTION SUBCUTANEOUS 2 TIMES DAILY
Status: DISCONTINUED | OUTPATIENT
Start: 2022-11-19 | End: 2022-11-19

## 2022-11-19 RX ORDER — BUDESONIDE 0.25 MG/2ML
0.25 INHALANT ORAL
Status: DISCONTINUED | OUTPATIENT
Start: 2022-11-19 | End: 2022-12-13 | Stop reason: HOSPADM

## 2022-11-19 RX ORDER — METHYLPREDNISOLONE SODIUM SUCCINATE 40 MG/ML
40 INJECTION, POWDER, LYOPHILIZED, FOR SOLUTION INTRAMUSCULAR; INTRAVENOUS EVERY 6 HOURS
Status: DISCONTINUED | OUTPATIENT
Start: 2022-11-19 | End: 2022-11-22

## 2022-11-19 RX ORDER — ARFORMOTEROL TARTRATE 15 UG/2ML
15 SOLUTION RESPIRATORY (INHALATION)
Status: DISCONTINUED | OUTPATIENT
Start: 2022-11-19 | End: 2022-12-13 | Stop reason: HOSPADM

## 2022-11-19 RX ADMIN — ARFORMOTEROL TARTRATE INHALATION 15 MCG: 15 SOLUTION RESPIRATORY (INHALATION) at 12:10

## 2022-11-19 RX ADMIN — IPRATROPIUM BROMIDE AND ALBUTEROL SULFATE 3 ML: 2.5; .5 SOLUTION RESPIRATORY (INHALATION) at 18:44

## 2022-11-19 RX ADMIN — BUDESONIDE 0.25 MG: 0.25 INHALANT ORAL at 12:10

## 2022-11-19 RX ADMIN — LISINOPRIL 10 MG: 10 TABLET ORAL at 08:36

## 2022-11-19 RX ADMIN — ARFORMOTEROL TARTRATE INHALATION 15 MCG: 15 SOLUTION RESPIRATORY (INHALATION) at 18:44

## 2022-11-19 RX ADMIN — BUDESONIDE 0.25 MG: 0.25 INHALANT ORAL at 18:44

## 2022-11-19 RX ADMIN — SERTRALINE HYDROCHLORIDE 200 MG: 100 TABLET ORAL at 08:36

## 2022-11-19 RX ADMIN — DIVALPROEX SODIUM 250 MG: 250 TABLET, DELAYED RELEASE ORAL at 08:36

## 2022-11-19 RX ADMIN — IPRATROPIUM BROMIDE AND ALBUTEROL SULFATE 3 ML: 2.5; .5 SOLUTION RESPIRATORY (INHALATION) at 12:10

## 2022-11-19 RX ADMIN — BUTALBITAL, ACETAMINOPHEN AND CAFFEINE 1 CAPSULE: 300; 40; 50 CAPSULE ORAL at 05:05

## 2022-11-19 RX ADMIN — SPIRONOLACTONE 25 MG: 25 TABLET ORAL at 08:36

## 2022-11-19 RX ADMIN — MONTELUKAST 10 MG: 10 TABLET, FILM COATED ORAL at 08:36

## 2022-11-19 RX ADMIN — IPRATROPIUM BROMIDE AND ALBUTEROL SULFATE 3 ML: 2.5; .5 SOLUTION RESPIRATORY (INHALATION) at 07:32

## 2022-11-19 RX ADMIN — LEVOTHYROXINE SODIUM 125 MCG: 125 TABLET ORAL at 05:17

## 2022-11-19 RX ADMIN — ONDANSETRON 4 MG: 2 INJECTION INTRAMUSCULAR; INTRAVENOUS at 17:24

## 2022-11-19 RX ADMIN — INSULIN HUMAN 2 UNITS: 100 INJECTION, SOLUTION PARENTERAL at 17:24

## 2022-11-19 RX ADMIN — ENOXAPARIN SODIUM 40 MG: 100 INJECTION SUBCUTANEOUS at 08:36

## 2022-11-19 RX ADMIN — INSULIN HUMAN 2 UNITS: 100 INJECTION, SOLUTION PARENTERAL at 12:54

## 2022-11-19 RX ADMIN — DIVALPROEX SODIUM 250 MG: 250 TABLET, DELAYED RELEASE ORAL at 01:40

## 2022-11-19 RX ADMIN — ONDANSETRON 4 MG: 2 INJECTION INTRAMUSCULAR; INTRAVENOUS at 12:54

## 2022-11-19 RX ADMIN — DIVALPROEX SODIUM 250 MG: 250 TABLET, DELAYED RELEASE ORAL at 20:35

## 2022-11-19 RX ADMIN — ATENOLOL 50 MG: 50 TABLET ORAL at 08:36

## 2022-11-19 RX ADMIN — ALPRAZOLAM 0.5 MG: 0.25 TABLET ORAL at 05:14

## 2022-11-19 RX ADMIN — SODIUM CHLORIDE 500 ML: 9 INJECTION, SOLUTION INTRAVENOUS at 03:36

## 2022-11-19 RX ADMIN — SODIUM CHLORIDE 250 ML: 9 INJECTION, SOLUTION INTRAVENOUS at 20:36

## 2022-11-19 RX ADMIN — ASPIRIN 81 MG CHEWABLE TABLET 81 MG: 81 TABLET CHEWABLE at 08:36

## 2022-11-20 LAB
ARTERIAL PATENCY WRIST A: POSITIVE
ARTERIAL PATENCY WRIST A: POSITIVE
BASE EXCESS BLDA CALC-SCNC: 2.8 MMOL/L (ref -2–2)
BASE EXCESS BLDA CALC-SCNC: 4.3 MMOL/L (ref -2–2)
BDY SITE: ABNORMAL
BDY SITE: ABNORMAL
CA-I BLDA-SCNC: 1.25 MMOL/L (ref 1.13–1.32)
CHLORIDE BLDA-SCNC: 95 MMOL/L (ref 98–106)
COHGB MFR BLD: 0.6 % (ref 0–1.5)
COHGB MFR BLD: 0.7 % (ref 0–1.5)
FHHB: 7.4 % (ref 0–5)
FHHB: 8.1 % (ref 0–5)
GAS FLOW AIRWAY: ABNORMAL L/MIN
GLUCOSE BLDA-MCNC: 164 MG/DL (ref 65–99)
GLUCOSE BLDC GLUCOMTR-MCNC: 120 MG/DL (ref 70–99)
GLUCOSE BLDC GLUCOMTR-MCNC: 127 MG/DL (ref 70–99)
GLUCOSE BLDC GLUCOMTR-MCNC: 132 MG/DL (ref 70–99)
GLUCOSE BLDC GLUCOMTR-MCNC: 140 MG/DL (ref 70–99)
GLUCOSE BLDC GLUCOMTR-MCNC: 158 MG/DL (ref 70–99)
HBA1C MFR BLD: 5.9 % (ref 4.8–5.6)
HCO3 BLDA-SCNC: 35 MMOL/L (ref 22–26)
HCO3 BLDA-SCNC: 35 MMOL/L (ref 22–26)
HGB BLDA-MCNC: 14.4 G/DL (ref 11.7–14.6)
HGB BLDA-MCNC: 14.6 G/DL (ref 11.7–14.6)
INHALED O2 CONCENTRATION: 30 %
INHALED O2 CONCENTRATION: 40 %
LACTATE BLDA-SCNC: 0.99 MMOL/L (ref 0.5–2)
LACTATE BLDA-SCNC: ABNORMAL MMOL/L
METHGB BLD QL: 0.3 % (ref 0–1.5)
METHGB BLD QL: 0.3 % (ref 0–1.5)
MODALITY: ABNORMAL
MODALITY: ABNORMAL
NOTE: ABNORMAL
OXYHGB MFR BLDV: 90.9 % (ref 94–99)
OXYHGB MFR BLDV: 91.7 % (ref 94–99)
PCO2 BLDA: 84.7 MM HG (ref 35–45)
PCO2 BLDA: 99.7 MM HG (ref 35–45)
PH BLDA: 7.16 PH UNITS (ref 7.35–7.45)
PH BLDA: 7.23 PH UNITS (ref 7.35–7.45)
PO2 BLD: 155 MM[HG] (ref 0–500)
PO2 BLD: 233 MM[HG] (ref 0–500)
PO2 BLDA: 61.8 MM HG (ref 80–100)
PO2 BLDA: 70 MM HG (ref 80–100)
POTASSIUM BLDA-SCNC: 4.37 MMOL/L (ref 3.5–5)
SAO2 % BLDCOA: 91.8 % (ref 95–99)
SAO2 % BLDCOA: 92.5 % (ref 95–99)
SODIUM BLDA-SCNC: 138.2 MMOL/L (ref 136–146)

## 2022-11-20 PROCEDURE — 82805 BLOOD GASES W/O2 SATURATION: CPT | Performed by: INTERNAL MEDICINE

## 2022-11-20 PROCEDURE — 99291 CRITICAL CARE FIRST HOUR: CPT | Performed by: INTERNAL MEDICINE

## 2022-11-20 PROCEDURE — 94799 UNLISTED PULMONARY SVC/PX: CPT

## 2022-11-20 PROCEDURE — 63710000001 INSULIN REGULAR HUMAN PER 5 UNITS: Performed by: INTERNAL MEDICINE

## 2022-11-20 PROCEDURE — 36600 WITHDRAWAL OF ARTERIAL BLOOD: CPT | Performed by: NURSE PRACTITIONER

## 2022-11-20 PROCEDURE — 82962 GLUCOSE BLOOD TEST: CPT

## 2022-11-20 PROCEDURE — 83050 HGB METHEMOGLOBIN QUAN: CPT | Performed by: NURSE PRACTITIONER

## 2022-11-20 PROCEDURE — 25010000002 METHYLPREDNISOLONE PER 40 MG: Performed by: INTERNAL MEDICINE

## 2022-11-20 PROCEDURE — 83050 HGB METHEMOGLOBIN QUAN: CPT | Performed by: INTERNAL MEDICINE

## 2022-11-20 PROCEDURE — 82375 ASSAY CARBOXYHB QUANT: CPT | Performed by: NURSE PRACTITIONER

## 2022-11-20 PROCEDURE — 94761 N-INVAS EAR/PLS OXIMETRY MLT: CPT

## 2022-11-20 PROCEDURE — 83036 HEMOGLOBIN GLYCOSYLATED A1C: CPT | Performed by: INTERNAL MEDICINE

## 2022-11-20 PROCEDURE — 99232 SBSQ HOSP IP/OBS MODERATE 35: CPT | Performed by: INTERNAL MEDICINE

## 2022-11-20 PROCEDURE — 94760 N-INVAS EAR/PLS OXIMETRY 1: CPT

## 2022-11-20 PROCEDURE — 94664 DEMO&/EVAL PT USE INHALER: CPT

## 2022-11-20 PROCEDURE — 25010000002 ONDANSETRON PER 1 MG: Performed by: INTERNAL MEDICINE

## 2022-11-20 PROCEDURE — 82375 ASSAY CARBOXYHB QUANT: CPT | Performed by: INTERNAL MEDICINE

## 2022-11-20 PROCEDURE — 82805 BLOOD GASES W/O2 SATURATION: CPT | Performed by: NURSE PRACTITIONER

## 2022-11-20 PROCEDURE — 25010000002 ENOXAPARIN PER 10 MG: Performed by: INTERNAL MEDICINE

## 2022-11-20 PROCEDURE — 36600 WITHDRAWAL OF ARTERIAL BLOOD: CPT | Performed by: INTERNAL MEDICINE

## 2022-11-20 RX ADMIN — LISINOPRIL 10 MG: 10 TABLET ORAL at 08:56

## 2022-11-20 RX ADMIN — BUDESONIDE 0.25 MG: 0.25 INHALANT ORAL at 06:24

## 2022-11-20 RX ADMIN — BUTALBITAL, ACETAMINOPHEN AND CAFFEINE 1 CAPSULE: 300; 40; 50 CAPSULE ORAL at 09:03

## 2022-11-20 RX ADMIN — DIVALPROEX SODIUM 250 MG: 250 TABLET, DELAYED RELEASE ORAL at 20:46

## 2022-11-20 RX ADMIN — SPIRONOLACTONE 25 MG: 25 TABLET ORAL at 08:56

## 2022-11-20 RX ADMIN — ONDANSETRON 4 MG: 2 INJECTION INTRAMUSCULAR; INTRAVENOUS at 14:39

## 2022-11-20 RX ADMIN — METHYLPREDNISOLONE SODIUM SUCCINATE 40 MG: 40 INJECTION, POWDER, FOR SOLUTION INTRAMUSCULAR; INTRAVENOUS at 23:50

## 2022-11-20 RX ADMIN — ARFORMOTEROL TARTRATE INHALATION 15 MCG: 15 SOLUTION RESPIRATORY (INHALATION) at 06:23

## 2022-11-20 RX ADMIN — ARFORMOTEROL TARTRATE INHALATION 15 MCG: 15 SOLUTION RESPIRATORY (INHALATION) at 19:23

## 2022-11-20 RX ADMIN — Medication 10 ML: at 08:56

## 2022-11-20 RX ADMIN — SERTRALINE HYDROCHLORIDE 200 MG: 100 TABLET ORAL at 08:56

## 2022-11-20 RX ADMIN — ENOXAPARIN SODIUM 40 MG: 100 INJECTION SUBCUTANEOUS at 08:56

## 2022-11-20 RX ADMIN — DIVALPROEX SODIUM 250 MG: 250 TABLET, DELAYED RELEASE ORAL at 08:56

## 2022-11-20 RX ADMIN — BUDESONIDE 0.25 MG: 0.25 INHALANT ORAL at 19:23

## 2022-11-20 RX ADMIN — ASPIRIN 81 MG CHEWABLE TABLET 81 MG: 81 TABLET CHEWABLE at 08:56

## 2022-11-20 RX ADMIN — ATENOLOL 50 MG: 50 TABLET ORAL at 08:56

## 2022-11-20 RX ADMIN — IPRATROPIUM BROMIDE AND ALBUTEROL SULFATE 3 ML: 2.5; .5 SOLUTION RESPIRATORY (INHALATION) at 11:47

## 2022-11-20 RX ADMIN — LEVOTHYROXINE SODIUM 125 MCG: 125 TABLET ORAL at 06:01

## 2022-11-20 RX ADMIN — IPRATROPIUM BROMIDE AND ALBUTEROL SULFATE 3 ML: 2.5; .5 SOLUTION RESPIRATORY (INHALATION) at 06:23

## 2022-11-20 RX ADMIN — ALPRAZOLAM 0.5 MG: 0.25 TABLET ORAL at 09:03

## 2022-11-20 RX ADMIN — INSULIN HUMAN 2 UNITS: 100 INJECTION, SOLUTION PARENTERAL at 23:49

## 2022-11-20 RX ADMIN — MONTELUKAST 10 MG: 10 TABLET, FILM COATED ORAL at 08:56

## 2022-11-20 RX ADMIN — ALPRAZOLAM 0.5 MG: 0.25 TABLET ORAL at 23:50

## 2022-11-20 RX ADMIN — IPRATROPIUM BROMIDE AND ALBUTEROL SULFATE 3 ML: 2.5; .5 SOLUTION RESPIRATORY (INHALATION) at 01:19

## 2022-11-20 RX ADMIN — IPRATROPIUM BROMIDE AND ALBUTEROL SULFATE 3 ML: 2.5; .5 SOLUTION RESPIRATORY (INHALATION) at 19:23

## 2022-11-21 LAB
B PARAPERT DNA SPEC QL NAA+PROBE: NOT DETECTED
B PERT DNA SPEC QL NAA+PROBE: NOT DETECTED
C PNEUM DNA NPH QL NAA+NON-PROBE: NOT DETECTED
FLUAV SUBTYP SPEC NAA+PROBE: NOT DETECTED
FLUBV RNA ISLT QL NAA+PROBE: NOT DETECTED
GLUCOSE BLDC GLUCOMTR-MCNC: 132 MG/DL (ref 70–99)
GLUCOSE BLDC GLUCOMTR-MCNC: 157 MG/DL (ref 70–99)
GLUCOSE BLDC GLUCOMTR-MCNC: 165 MG/DL (ref 70–99)
GLUCOSE BLDC GLUCOMTR-MCNC: 167 MG/DL (ref 70–99)
HADV DNA SPEC NAA+PROBE: NOT DETECTED
HCOV 229E RNA SPEC QL NAA+PROBE: NOT DETECTED
HCOV HKU1 RNA SPEC QL NAA+PROBE: NOT DETECTED
HCOV NL63 RNA SPEC QL NAA+PROBE: NOT DETECTED
HCOV OC43 RNA SPEC QL NAA+PROBE: NOT DETECTED
HMPV RNA NPH QL NAA+NON-PROBE: DETECTED
HPIV1 RNA ISLT QL NAA+PROBE: NOT DETECTED
HPIV2 RNA SPEC QL NAA+PROBE: NOT DETECTED
HPIV3 RNA NPH QL NAA+PROBE: NOT DETECTED
HPIV4 P GENE NPH QL NAA+PROBE: NOT DETECTED
L PNEUMO1 AG UR QL IA: NEGATIVE
M PNEUMO IGG SER IA-ACNC: NOT DETECTED
RHINOVIRUS RNA SPEC NAA+PROBE: NOT DETECTED
RSV RNA NPH QL NAA+NON-PROBE: NOT DETECTED
S PNEUM AG SPEC QL LA: NEGATIVE
SARS-COV-2 RNA PNL SPEC NAA+PROBE: NOT DETECTED

## 2022-11-21 PROCEDURE — 94799 UNLISTED PULMONARY SVC/PX: CPT

## 2022-11-21 PROCEDURE — 94660 CPAP INITIATION&MGMT: CPT

## 2022-11-21 PROCEDURE — 82962 GLUCOSE BLOOD TEST: CPT

## 2022-11-21 PROCEDURE — 94760 N-INVAS EAR/PLS OXIMETRY 1: CPT

## 2022-11-21 PROCEDURE — 25010000002 ONDANSETRON PER 1 MG: Performed by: INTERNAL MEDICINE

## 2022-11-21 PROCEDURE — 63710000001 INSULIN REGULAR HUMAN PER 5 UNITS: Performed by: INTERNAL MEDICINE

## 2022-11-21 PROCEDURE — 87449 NOS EACH ORGANISM AG IA: CPT | Performed by: NURSE PRACTITIONER

## 2022-11-21 PROCEDURE — 99232 SBSQ HOSP IP/OBS MODERATE 35: CPT | Performed by: INTERNAL MEDICINE

## 2022-11-21 PROCEDURE — 94664 DEMO&/EVAL PT USE INHALER: CPT

## 2022-11-21 PROCEDURE — 25010000002 ENOXAPARIN PER 10 MG: Performed by: INTERNAL MEDICINE

## 2022-11-21 PROCEDURE — 94761 N-INVAS EAR/PLS OXIMETRY MLT: CPT

## 2022-11-21 PROCEDURE — 0202U NFCT DS 22 TRGT SARS-COV-2: CPT | Performed by: NURSE PRACTITIONER

## 2022-11-21 PROCEDURE — 25010000002 METHYLPREDNISOLONE PER 40 MG: Performed by: INTERNAL MEDICINE

## 2022-11-21 RX ADMIN — INSULIN HUMAN 2 UNITS: 100 INJECTION, SOLUTION PARENTERAL at 12:30

## 2022-11-21 RX ADMIN — LEVOTHYROXINE SODIUM 125 MCG: 125 TABLET ORAL at 05:24

## 2022-11-21 RX ADMIN — LISINOPRIL 10 MG: 10 TABLET ORAL at 09:33

## 2022-11-21 RX ADMIN — METHYLPREDNISOLONE SODIUM SUCCINATE 40 MG: 40 INJECTION, POWDER, FOR SOLUTION INTRAMUSCULAR; INTRAVENOUS at 05:24

## 2022-11-21 RX ADMIN — ARFORMOTEROL TARTRATE INHALATION 15 MCG: 15 SOLUTION RESPIRATORY (INHALATION) at 18:30

## 2022-11-21 RX ADMIN — BUDESONIDE 0.25 MG: 0.25 INHALANT ORAL at 18:30

## 2022-11-21 RX ADMIN — DIVALPROEX SODIUM 250 MG: 250 TABLET, DELAYED RELEASE ORAL at 09:56

## 2022-11-21 RX ADMIN — BUTALBITAL, ACETAMINOPHEN AND CAFFEINE 1 CAPSULE: 300; 40; 50 CAPSULE ORAL at 16:02

## 2022-11-21 RX ADMIN — IPRATROPIUM BROMIDE AND ALBUTEROL SULFATE 3 ML: 2.5; .5 SOLUTION RESPIRATORY (INHALATION) at 07:12

## 2022-11-21 RX ADMIN — ONDANSETRON 4 MG: 2 INJECTION INTRAMUSCULAR; INTRAVENOUS at 04:03

## 2022-11-21 RX ADMIN — SERTRALINE HYDROCHLORIDE 200 MG: 100 TABLET ORAL at 09:56

## 2022-11-21 RX ADMIN — ASPIRIN 81 MG CHEWABLE TABLET 81 MG: 81 TABLET CHEWABLE at 09:33

## 2022-11-21 RX ADMIN — ALPRAZOLAM 0.5 MG: 0.25 TABLET ORAL at 09:33

## 2022-11-21 RX ADMIN — IPRATROPIUM BROMIDE AND ALBUTEROL SULFATE 3 ML: 2.5; .5 SOLUTION RESPIRATORY (INHALATION) at 18:30

## 2022-11-21 RX ADMIN — MONTELUKAST 10 MG: 10 TABLET, FILM COATED ORAL at 09:33

## 2022-11-21 RX ADMIN — ENOXAPARIN SODIUM 40 MG: 100 INJECTION SUBCUTANEOUS at 09:32

## 2022-11-21 RX ADMIN — ATENOLOL 50 MG: 50 TABLET ORAL at 09:56

## 2022-11-21 RX ADMIN — SPIRONOLACTONE 25 MG: 25 TABLET ORAL at 09:33

## 2022-11-21 RX ADMIN — ARFORMOTEROL TARTRATE INHALATION 15 MCG: 15 SOLUTION RESPIRATORY (INHALATION) at 07:12

## 2022-11-21 RX ADMIN — Medication 10 ML: at 09:32

## 2022-11-21 RX ADMIN — IPRATROPIUM BROMIDE AND ALBUTEROL SULFATE 3 ML: 2.5; .5 SOLUTION RESPIRATORY (INHALATION) at 12:10

## 2022-11-21 RX ADMIN — BUDESONIDE 0.25 MG: 0.25 INHALANT ORAL at 07:12

## 2022-11-21 RX ADMIN — INSULIN HUMAN 2 UNITS: 100 INJECTION, SOLUTION PARENTERAL at 05:24

## 2022-11-21 RX ADMIN — IPRATROPIUM BROMIDE AND ALBUTEROL SULFATE 3 ML: 2.5; .5 SOLUTION RESPIRATORY (INHALATION) at 00:55

## 2022-11-21 RX ADMIN — DIVALPROEX SODIUM 250 MG: 250 TABLET, DELAYED RELEASE ORAL at 20:44

## 2022-11-22 LAB
GLUCOSE BLDC GLUCOMTR-MCNC: 141 MG/DL (ref 70–99)
GLUCOSE BLDC GLUCOMTR-MCNC: 145 MG/DL (ref 70–99)
GLUCOSE BLDC GLUCOMTR-MCNC: 149 MG/DL (ref 70–99)
GLUCOSE BLDC GLUCOMTR-MCNC: 153 MG/DL (ref 70–99)
GLUCOSE BLDC GLUCOMTR-MCNC: 163 MG/DL (ref 70–99)

## 2022-11-22 PROCEDURE — 63710000001 INSULIN REGULAR HUMAN PER 5 UNITS: Performed by: INTERNAL MEDICINE

## 2022-11-22 PROCEDURE — 94761 N-INVAS EAR/PLS OXIMETRY MLT: CPT

## 2022-11-22 PROCEDURE — 94799 UNLISTED PULMONARY SVC/PX: CPT

## 2022-11-22 PROCEDURE — 25010000002 ENOXAPARIN PER 10 MG: Performed by: INTERNAL MEDICINE

## 2022-11-22 PROCEDURE — 99233 SBSQ HOSP IP/OBS HIGH 50: CPT | Performed by: FAMILY MEDICINE

## 2022-11-22 PROCEDURE — 25010000002 LORAZEPAM PER 2 MG: Performed by: FAMILY MEDICINE

## 2022-11-22 PROCEDURE — 25010000002 FUROSEMIDE PER 20 MG: Performed by: NURSE PRACTITIONER

## 2022-11-22 PROCEDURE — 94660 CPAP INITIATION&MGMT: CPT

## 2022-11-22 PROCEDURE — 82962 GLUCOSE BLOOD TEST: CPT

## 2022-11-22 PROCEDURE — 99291 CRITICAL CARE FIRST HOUR: CPT | Performed by: INTERNAL MEDICINE

## 2022-11-22 PROCEDURE — 94664 DEMO&/EVAL PT USE INHALER: CPT

## 2022-11-22 PROCEDURE — 25010000002 DEXAMETHASONE PER 1 MG: Performed by: NURSE PRACTITIONER

## 2022-11-22 RX ORDER — DEXAMETHASONE SODIUM PHOSPHATE 10 MG/ML
6 INJECTION INTRAMUSCULAR; INTRAVENOUS 2 TIMES DAILY
Status: DISCONTINUED | OUTPATIENT
Start: 2022-11-22 | End: 2022-11-25

## 2022-11-22 RX ORDER — LORAZEPAM 2 MG/ML
0.5 INJECTION INTRAMUSCULAR EVERY 4 HOURS PRN
Status: DISCONTINUED | OUTPATIENT
Start: 2022-11-22 | End: 2022-11-25

## 2022-11-22 RX ORDER — ACETAMINOPHEN 325 MG/1
650 TABLET ORAL EVERY 4 HOURS PRN
Status: DISCONTINUED | OUTPATIENT
Start: 2022-11-22 | End: 2022-11-25

## 2022-11-22 RX ORDER — FUROSEMIDE 10 MG/ML
40 INJECTION INTRAMUSCULAR; INTRAVENOUS ONCE
Status: COMPLETED | OUTPATIENT
Start: 2022-11-22 | End: 2022-11-22

## 2022-11-22 RX ADMIN — LORAZEPAM 0.5 MG: 2 INJECTION INTRAMUSCULAR; INTRAVENOUS at 15:15

## 2022-11-22 RX ADMIN — SERTRALINE HYDROCHLORIDE 200 MG: 100 TABLET ORAL at 09:23

## 2022-11-22 RX ADMIN — ACETAMINOPHEN 650 MG: 325 TABLET ORAL at 03:50

## 2022-11-22 RX ADMIN — MONTELUKAST 10 MG: 10 TABLET, FILM COATED ORAL at 09:23

## 2022-11-22 RX ADMIN — ENOXAPARIN SODIUM 40 MG: 100 INJECTION SUBCUTANEOUS at 09:22

## 2022-11-22 RX ADMIN — DIVALPROEX SODIUM 250 MG: 250 TABLET, DELAYED RELEASE ORAL at 21:45

## 2022-11-22 RX ADMIN — BUDESONIDE 0.25 MG: 0.25 INHALANT ORAL at 07:44

## 2022-11-22 RX ADMIN — IPRATROPIUM BROMIDE AND ALBUTEROL SULFATE 3 ML: 2.5; .5 SOLUTION RESPIRATORY (INHALATION) at 13:28

## 2022-11-22 RX ADMIN — ARFORMOTEROL TARTRATE INHALATION 15 MCG: 15 SOLUTION RESPIRATORY (INHALATION) at 19:40

## 2022-11-22 RX ADMIN — LISINOPRIL 10 MG: 10 TABLET ORAL at 09:23

## 2022-11-22 RX ADMIN — IPRATROPIUM BROMIDE AND ALBUTEROL SULFATE 3 ML: 2.5; .5 SOLUTION RESPIRATORY (INHALATION) at 07:42

## 2022-11-22 RX ADMIN — LORAZEPAM 0.5 MG: 2 INJECTION INTRAMUSCULAR; INTRAVENOUS at 23:17

## 2022-11-22 RX ADMIN — INSULIN HUMAN 2 UNITS: 100 INJECTION, SOLUTION PARENTERAL at 17:35

## 2022-11-22 RX ADMIN — BUTALBITAL, ACETAMINOPHEN AND CAFFEINE 1 CAPSULE: 300; 40; 50 CAPSULE ORAL at 11:41

## 2022-11-22 RX ADMIN — BUTALBITAL, ACETAMINOPHEN AND CAFFEINE 1 CAPSULE: 300; 40; 50 CAPSULE ORAL at 23:17

## 2022-11-22 RX ADMIN — IPRATROPIUM BROMIDE AND ALBUTEROL SULFATE 3 ML: 2.5; .5 SOLUTION RESPIRATORY (INHALATION) at 19:40

## 2022-11-22 RX ADMIN — IPRATROPIUM BROMIDE AND ALBUTEROL SULFATE 3 ML: 2.5; .5 SOLUTION RESPIRATORY (INHALATION) at 00:21

## 2022-11-22 RX ADMIN — DEXAMETHASONE SODIUM PHOSPHATE 6 MG: 10 INJECTION INTRAMUSCULAR; INTRAVENOUS at 21:45

## 2022-11-22 RX ADMIN — ARFORMOTEROL TARTRATE INHALATION 15 MCG: 15 SOLUTION RESPIRATORY (INHALATION) at 07:41

## 2022-11-22 RX ADMIN — LEVOTHYROXINE SODIUM 125 MCG: 125 TABLET ORAL at 05:21

## 2022-11-22 RX ADMIN — FUROSEMIDE 40 MG: 10 INJECTION, SOLUTION INTRAMUSCULAR; INTRAVENOUS at 12:51

## 2022-11-22 RX ADMIN — Medication 10 ML: at 09:22

## 2022-11-22 RX ADMIN — SPIRONOLACTONE 25 MG: 25 TABLET ORAL at 09:23

## 2022-11-22 RX ADMIN — BUDESONIDE 0.25 MG: 0.25 INHALANT ORAL at 19:40

## 2022-11-22 RX ADMIN — DIVALPROEX SODIUM 250 MG: 250 TABLET, DELAYED RELEASE ORAL at 09:22

## 2022-11-23 ENCOUNTER — APPOINTMENT (OUTPATIENT)
Dept: GENERAL RADIOLOGY | Facility: HOSPITAL | Age: 57
End: 2022-11-23

## 2022-11-23 ENCOUNTER — APPOINTMENT (OUTPATIENT)
Dept: CT IMAGING | Facility: HOSPITAL | Age: 57
End: 2022-11-23

## 2022-11-23 LAB
ALBUMIN SERPL-MCNC: 4.2 G/DL (ref 3.5–5.2)
ALBUMIN/GLOB SERPL: 1.3 G/DL
ALP SERPL-CCNC: 79 U/L (ref 39–117)
ALT SERPL W P-5'-P-CCNC: 42 U/L (ref 1–33)
ANION GAP SERPL CALCULATED.3IONS-SCNC: 8.6 MMOL/L (ref 5–15)
ARTERIAL PATENCY WRIST A: ABNORMAL
AST SERPL-CCNC: 17 U/L (ref 1–32)
BASE EXCESS BLDA CALC-SCNC: 12 MMOL/L (ref -2–2)
BDY SITE: ABNORMAL
BILIRUB SERPL-MCNC: 0.3 MG/DL (ref 0–1.2)
BUN SERPL-MCNC: 45 MG/DL (ref 6–20)
BUN/CREAT SERPL: 67.2 (ref 7–25)
CALCIUM SPEC-SCNC: 10.3 MG/DL (ref 8.6–10.5)
CHLORIDE SERPL-SCNC: 93 MMOL/L (ref 98–107)
CO2 SERPL-SCNC: 44.4 MMOL/L (ref 22–29)
COHGB MFR BLD: 0.8 % (ref 0–1.5)
CREAT SERPL-MCNC: 0.67 MG/DL (ref 0.57–1)
D DIMER PPP FEU-MCNC: 1.84 MCGFEU/ML (ref 0–0.57)
DEPRECATED RDW RBC AUTO: 45 FL (ref 37–54)
EGFRCR SERPLBLD CKD-EPI 2021: 102.1 ML/MIN/1.73
ERYTHROCYTE [DISTWIDTH] IN BLOOD BY AUTOMATED COUNT: 12.4 % (ref 12.3–15.4)
FHHB: 6.1 % (ref 0–5)
GLOBULIN UR ELPH-MCNC: 3.2 GM/DL
GLUCOSE BLDC GLUCOMTR-MCNC: 146 MG/DL (ref 70–99)
GLUCOSE BLDC GLUCOMTR-MCNC: 163 MG/DL (ref 70–99)
GLUCOSE BLDC GLUCOMTR-MCNC: 166 MG/DL (ref 70–99)
GLUCOSE BLDC GLUCOMTR-MCNC: 185 MG/DL (ref 70–99)
GLUCOSE SERPL-MCNC: 170 MG/DL (ref 65–99)
HCO3 BLDA-SCNC: 41.1 MMOL/L (ref 22–26)
HCT VFR BLD AUTO: 47.5 % (ref 34–46.6)
HGB BLD-MCNC: 14.9 G/DL (ref 12–15.9)
HGB BLDA-MCNC: 15.2 G/DL (ref 11.7–14.6)
INHALED O2 CONCENTRATION: 45 %
LACTATE BLDA-SCNC: ABNORMAL MMOL/L
MCH RBC QN AUTO: 30.8 PG (ref 26.6–33)
MCHC RBC AUTO-ENTMCNC: 31.4 G/DL (ref 31.5–35.7)
MCV RBC AUTO: 98.1 FL (ref 79–97)
METHGB BLD QL: 0.3 % (ref 0–1.5)
MODALITY: ABNORMAL
NOTE: ABNORMAL
OXYHGB MFR BLDV: 92.8 % (ref 94–99)
PCO2 BLDA: 73.9 MM HG (ref 35–45)
PH BLDA: 7.36 PH UNITS (ref 7.35–7.45)
PLATELET # BLD AUTO: 211 10*3/MM3 (ref 140–450)
PMV BLD AUTO: 11.9 FL (ref 6–12)
PO2 BLD: 153 MM[HG] (ref 0–500)
PO2 BLDA: 68.7 MM HG (ref 80–100)
POTASSIUM SERPL-SCNC: 3.8 MMOL/L (ref 3.5–5.2)
PROT SERPL-MCNC: 7.4 G/DL (ref 6–8.5)
RBC # BLD AUTO: 4.84 10*6/MM3 (ref 3.77–5.28)
SAO2 % BLDCOA: 93.8 % (ref 95–99)
SET MECH RESP RATE: 20
SODIUM SERPL-SCNC: 146 MMOL/L (ref 136–145)
WBC NRBC COR # BLD: 6.3 10*3/MM3 (ref 3.4–10.8)

## 2022-11-23 PROCEDURE — 25010000002 ENOXAPARIN PER 10 MG: Performed by: INTERNAL MEDICINE

## 2022-11-23 PROCEDURE — 85027 COMPLETE CBC AUTOMATED: CPT | Performed by: FAMILY MEDICINE

## 2022-11-23 PROCEDURE — 94799 UNLISTED PULMONARY SVC/PX: CPT

## 2022-11-23 PROCEDURE — 94761 N-INVAS EAR/PLS OXIMETRY MLT: CPT

## 2022-11-23 PROCEDURE — 94664 DEMO&/EVAL PT USE INHALER: CPT

## 2022-11-23 PROCEDURE — 25010000002 DEXAMETHASONE PER 1 MG: Performed by: NURSE PRACTITIONER

## 2022-11-23 PROCEDURE — 83050 HGB METHEMOGLOBIN QUAN: CPT | Performed by: INTERNAL MEDICINE

## 2022-11-23 PROCEDURE — 80053 COMPREHEN METABOLIC PANEL: CPT | Performed by: FAMILY MEDICINE

## 2022-11-23 PROCEDURE — 25010000002 ENOXAPARIN PER 10 MG: Performed by: FAMILY MEDICINE

## 2022-11-23 PROCEDURE — 71045 X-RAY EXAM CHEST 1 VIEW: CPT

## 2022-11-23 PROCEDURE — 99233 SBSQ HOSP IP/OBS HIGH 50: CPT | Performed by: FAMILY MEDICINE

## 2022-11-23 PROCEDURE — 82375 ASSAY CARBOXYHB QUANT: CPT | Performed by: INTERNAL MEDICINE

## 2022-11-23 PROCEDURE — 36600 WITHDRAWAL OF ARTERIAL BLOOD: CPT | Performed by: INTERNAL MEDICINE

## 2022-11-23 PROCEDURE — 71260 CT THORAX DX C+: CPT

## 2022-11-23 PROCEDURE — 85379 FIBRIN DEGRADATION QUANT: CPT | Performed by: INTERNAL MEDICINE

## 2022-11-23 PROCEDURE — 0 IOPAMIDOL PER 1 ML: Performed by: FAMILY MEDICINE

## 2022-11-23 PROCEDURE — 25010000002 LORAZEPAM PER 2 MG: Performed by: FAMILY MEDICINE

## 2022-11-23 PROCEDURE — 82805 BLOOD GASES W/O2 SATURATION: CPT | Performed by: INTERNAL MEDICINE

## 2022-11-23 PROCEDURE — 99291 CRITICAL CARE FIRST HOUR: CPT | Performed by: INTERNAL MEDICINE

## 2022-11-23 PROCEDURE — 94660 CPAP INITIATION&MGMT: CPT

## 2022-11-23 PROCEDURE — 82962 GLUCOSE BLOOD TEST: CPT

## 2022-11-23 RX ORDER — ENOXAPARIN SODIUM 100 MG/ML
100 INJECTION SUBCUTANEOUS EVERY 12 HOURS
Status: DISCONTINUED | OUTPATIENT
Start: 2022-11-23 | End: 2022-11-24

## 2022-11-23 RX ADMIN — DIVALPROEX SODIUM 250 MG: 250 TABLET, DELAYED RELEASE ORAL at 22:29

## 2022-11-23 RX ADMIN — ENOXAPARIN SODIUM 40 MG: 100 INJECTION SUBCUTANEOUS at 09:56

## 2022-11-23 RX ADMIN — IPRATROPIUM BROMIDE AND ALBUTEROL SULFATE 3 ML: 2.5; .5 SOLUTION RESPIRATORY (INHALATION) at 07:41

## 2022-11-23 RX ADMIN — LISINOPRIL 10 MG: 10 TABLET ORAL at 09:55

## 2022-11-23 RX ADMIN — ARFORMOTEROL TARTRATE INHALATION 15 MCG: 15 SOLUTION RESPIRATORY (INHALATION) at 19:08

## 2022-11-23 RX ADMIN — IPRATROPIUM BROMIDE AND ALBUTEROL SULFATE 3 ML: 2.5; .5 SOLUTION RESPIRATORY (INHALATION) at 01:31

## 2022-11-23 RX ADMIN — ENOXAPARIN SODIUM 100 MG: 100 INJECTION SUBCUTANEOUS at 22:29

## 2022-11-23 RX ADMIN — ATENOLOL 50 MG: 50 TABLET ORAL at 09:55

## 2022-11-23 RX ADMIN — ALPRAZOLAM 0.5 MG: 0.25 TABLET ORAL at 17:34

## 2022-11-23 RX ADMIN — LEVOTHYROXINE SODIUM 125 MCG: 125 TABLET ORAL at 05:22

## 2022-11-23 RX ADMIN — BUDESONIDE 0.25 MG: 0.25 INHALANT ORAL at 07:42

## 2022-11-23 RX ADMIN — ASPIRIN 81 MG CHEWABLE TABLET 81 MG: 81 TABLET CHEWABLE at 09:55

## 2022-11-23 RX ADMIN — Medication 10 ML: at 09:55

## 2022-11-23 RX ADMIN — SERTRALINE HYDROCHLORIDE 200 MG: 100 TABLET ORAL at 09:55

## 2022-11-23 RX ADMIN — SPIRONOLACTONE 25 MG: 25 TABLET ORAL at 09:56

## 2022-11-23 RX ADMIN — IPRATROPIUM BROMIDE AND ALBUTEROL SULFATE 3 ML: 2.5; .5 SOLUTION RESPIRATORY (INHALATION) at 19:09

## 2022-11-23 RX ADMIN — IPRATROPIUM BROMIDE AND ALBUTEROL SULFATE 3 ML: 2.5; .5 SOLUTION RESPIRATORY (INHALATION) at 11:44

## 2022-11-23 RX ADMIN — LORAZEPAM 0.5 MG: 2 INJECTION INTRAMUSCULAR; INTRAVENOUS at 17:06

## 2022-11-23 RX ADMIN — MONTELUKAST 10 MG: 10 TABLET, FILM COATED ORAL at 09:55

## 2022-11-23 RX ADMIN — BUDESONIDE 0.25 MG: 0.25 INHALANT ORAL at 19:09

## 2022-11-23 RX ADMIN — IOPAMIDOL 100 ML: 755 INJECTION, SOLUTION INTRAVENOUS at 21:14

## 2022-11-23 RX ADMIN — DIVALPROEX SODIUM 250 MG: 250 TABLET, DELAYED RELEASE ORAL at 09:55

## 2022-11-23 RX ADMIN — DEXAMETHASONE SODIUM PHOSPHATE 6 MG: 10 INJECTION INTRAMUSCULAR; INTRAVENOUS at 22:29

## 2022-11-23 RX ADMIN — BUSPIRONE HYDROCHLORIDE 5 MG: 5 TABLET ORAL at 09:55

## 2022-11-23 RX ADMIN — ARFORMOTEROL TARTRATE INHALATION 15 MCG: 15 SOLUTION RESPIRATORY (INHALATION) at 07:42

## 2022-11-23 RX ADMIN — DEXAMETHASONE SODIUM PHOSPHATE 6 MG: 10 INJECTION INTRAMUSCULAR; INTRAVENOUS at 09:55

## 2022-11-24 LAB
ALBUMIN SERPL-MCNC: 4.3 G/DL (ref 3.5–5.2)
ANION GAP SERPL CALCULATED.3IONS-SCNC: 7.2 MMOL/L (ref 5–15)
ARTERIAL PATENCY WRIST A: POSITIVE
ARTERIAL PATENCY WRIST A: POSITIVE
BASE EXCESS BLDA CALC-SCNC: 11 MMOL/L (ref -2–2)
BASE EXCESS BLDA CALC-SCNC: 11.3 MMOL/L (ref -2–2)
BDY SITE: ABNORMAL
BDY SITE: ABNORMAL
BUN SERPL-MCNC: 40 MG/DL (ref 6–20)
BUN/CREAT SERPL: 53.3 (ref 7–25)
CALCIUM SPEC-SCNC: 10.8 MG/DL (ref 8.6–10.5)
CHLORIDE SERPL-SCNC: 94 MMOL/L (ref 98–107)
CO2 SERPL-SCNC: 47.8 MMOL/L (ref 22–29)
COHGB MFR BLD: 0.5 % (ref 0–1.5)
COHGB MFR BLD: 0.8 % (ref 0–1.5)
CREAT SERPL-MCNC: 0.75 MG/DL (ref 0.57–1)
EGFRCR SERPLBLD CKD-EPI 2021: 93 ML/MIN/1.73
FHHB: 5.8 % (ref 0–5)
FHHB: 8.1 % (ref 0–5)
GLUCOSE BLDC GLUCOMTR-MCNC: 168 MG/DL (ref 70–99)
GLUCOSE BLDC GLUCOMTR-MCNC: 178 MG/DL (ref 70–99)
GLUCOSE BLDC GLUCOMTR-MCNC: 185 MG/DL (ref 70–99)
GLUCOSE SERPL-MCNC: 175 MG/DL (ref 65–99)
HCO3 BLDA-SCNC: 40.9 MMOL/L (ref 22–26)
HCO3 BLDA-SCNC: 41.4 MMOL/L (ref 22–26)
HGB BLDA-MCNC: 14.9 G/DL (ref 11.7–14.6)
HGB BLDA-MCNC: 15.3 G/DL (ref 11.7–14.6)
INHALED O2 CONCENTRATION: 45 %
INHALED O2 CONCENTRATION: 50 %
METHGB BLD QL: 0.3 % (ref 0–1.5)
METHGB BLD QL: 0.3 % (ref 0–1.5)
MODALITY: ABNORMAL
MODALITY: ABNORMAL
NOTE: ABNORMAL
NOTE: ABNORMAL
OXYHGB MFR BLDV: 90.8 % (ref 94–99)
OXYHGB MFR BLDV: 93.4 % (ref 94–99)
PCO2 BLDA: 78.1 MM HG (ref 35–45)
PCO2 BLDA: 83.1 MM HG (ref 35–45)
PH BLDA: 7.32 PH UNITS (ref 7.35–7.45)
PH BLDA: 7.34 PH UNITS (ref 7.35–7.45)
PHOSPHATE SERPL-MCNC: 2 MG/DL (ref 2.5–4.5)
PO2 BLD: 147 MM[HG] (ref 0–500)
PO2 BLD: 153 MM[HG] (ref 0–500)
PO2 BLDA: 66.3 MM HG (ref 80–100)
PO2 BLDA: 76.6 MM HG (ref 80–100)
POTASSIUM SERPL-SCNC: 4.1 MMOL/L (ref 3.5–5.2)
SAO2 % BLDCOA: 91.8 % (ref 95–99)
SAO2 % BLDCOA: 94.2 % (ref 95–99)
SODIUM SERPL-SCNC: 149 MMOL/L (ref 136–145)

## 2022-11-24 PROCEDURE — 94761 N-INVAS EAR/PLS OXIMETRY MLT: CPT

## 2022-11-24 PROCEDURE — 82805 BLOOD GASES W/O2 SATURATION: CPT | Performed by: NURSE PRACTITIONER

## 2022-11-24 PROCEDURE — 99233 SBSQ HOSP IP/OBS HIGH 50: CPT | Performed by: FAMILY MEDICINE

## 2022-11-24 PROCEDURE — 25010000002 LORAZEPAM PER 2 MG: Performed by: FAMILY MEDICINE

## 2022-11-24 PROCEDURE — 25010000002 ENOXAPARIN PER 10 MG: Performed by: FAMILY MEDICINE

## 2022-11-24 PROCEDURE — 63710000001 INSULIN REGULAR HUMAN PER 5 UNITS: Performed by: INTERNAL MEDICINE

## 2022-11-24 PROCEDURE — 82375 ASSAY CARBOXYHB QUANT: CPT | Performed by: STUDENT IN AN ORGANIZED HEALTH CARE EDUCATION/TRAINING PROGRAM

## 2022-11-24 PROCEDURE — 99291 CRITICAL CARE FIRST HOUR: CPT | Performed by: STUDENT IN AN ORGANIZED HEALTH CARE EDUCATION/TRAINING PROGRAM

## 2022-11-24 PROCEDURE — 82805 BLOOD GASES W/O2 SATURATION: CPT | Performed by: STUDENT IN AN ORGANIZED HEALTH CARE EDUCATION/TRAINING PROGRAM

## 2022-11-24 PROCEDURE — 82962 GLUCOSE BLOOD TEST: CPT

## 2022-11-24 PROCEDURE — 94799 UNLISTED PULMONARY SVC/PX: CPT

## 2022-11-24 PROCEDURE — 80069 RENAL FUNCTION PANEL: CPT | Performed by: FAMILY MEDICINE

## 2022-11-24 PROCEDURE — 83050 HGB METHEMOGLOBIN QUAN: CPT | Performed by: STUDENT IN AN ORGANIZED HEALTH CARE EDUCATION/TRAINING PROGRAM

## 2022-11-24 PROCEDURE — 25010000002 DEXAMETHASONE PER 1 MG: Performed by: NURSE PRACTITIONER

## 2022-11-24 PROCEDURE — 36600 WITHDRAWAL OF ARTERIAL BLOOD: CPT | Performed by: STUDENT IN AN ORGANIZED HEALTH CARE EDUCATION/TRAINING PROGRAM

## 2022-11-24 PROCEDURE — 83050 HGB METHEMOGLOBIN QUAN: CPT | Performed by: NURSE PRACTITIONER

## 2022-11-24 PROCEDURE — 36600 WITHDRAWAL OF ARTERIAL BLOOD: CPT | Performed by: NURSE PRACTITIONER

## 2022-11-24 PROCEDURE — 25010000002 ONDANSETRON PER 1 MG: Performed by: INTERNAL MEDICINE

## 2022-11-24 PROCEDURE — 94660 CPAP INITIATION&MGMT: CPT

## 2022-11-24 PROCEDURE — 82375 ASSAY CARBOXYHB QUANT: CPT | Performed by: NURSE PRACTITIONER

## 2022-11-24 RX ORDER — ENOXAPARIN SODIUM 100 MG/ML
40 INJECTION SUBCUTANEOUS EVERY 24 HOURS
Status: DISCONTINUED | OUTPATIENT
Start: 2022-11-25 | End: 2022-12-13 | Stop reason: HOSPADM

## 2022-11-24 RX ORDER — FENTANYL/ROPIVACAINE/NS/PF 2-625MCG/1
15 PLASTIC BAG, INJECTION (ML) EPIDURAL ONCE
Status: COMPLETED | OUTPATIENT
Start: 2022-11-24 | End: 2022-11-24

## 2022-11-24 RX ADMIN — IPRATROPIUM BROMIDE AND ALBUTEROL SULFATE 3 ML: 2.5; .5 SOLUTION RESPIRATORY (INHALATION) at 12:26

## 2022-11-24 RX ADMIN — ENOXAPARIN SODIUM 100 MG: 100 INJECTION SUBCUTANEOUS at 09:20

## 2022-11-24 RX ADMIN — INSULIN HUMAN 2 UNITS: 100 INJECTION, SOLUTION PARENTERAL at 18:10

## 2022-11-24 RX ADMIN — BUDESONIDE 0.25 MG: 0.25 INHALANT ORAL at 06:43

## 2022-11-24 RX ADMIN — Medication 10 ML: at 09:24

## 2022-11-24 RX ADMIN — DIVALPROEX SODIUM 250 MG: 250 TABLET, DELAYED RELEASE ORAL at 09:23

## 2022-11-24 RX ADMIN — ALPRAZOLAM 0.5 MG: 0.25 TABLET ORAL at 20:00

## 2022-11-24 RX ADMIN — DIVALPROEX SODIUM 250 MG: 250 TABLET, DELAYED RELEASE ORAL at 20:00

## 2022-11-24 RX ADMIN — ARFORMOTEROL TARTRATE INHALATION 15 MCG: 15 SOLUTION RESPIRATORY (INHALATION) at 06:43

## 2022-11-24 RX ADMIN — ARFORMOTEROL TARTRATE INHALATION 15 MCG: 15 SOLUTION RESPIRATORY (INHALATION) at 19:17

## 2022-11-24 RX ADMIN — ONDANSETRON 4 MG: 2 INJECTION INTRAMUSCULAR; INTRAVENOUS at 10:09

## 2022-11-24 RX ADMIN — LORAZEPAM 0.5 MG: 2 INJECTION INTRAMUSCULAR; INTRAVENOUS at 04:50

## 2022-11-24 RX ADMIN — BUDESONIDE 0.25 MG: 0.25 INHALANT ORAL at 19:18

## 2022-11-24 RX ADMIN — IPRATROPIUM BROMIDE AND ALBUTEROL SULFATE 3 ML: 2.5; .5 SOLUTION RESPIRATORY (INHALATION) at 06:43

## 2022-11-24 RX ADMIN — DEXAMETHASONE SODIUM PHOSPHATE 6 MG: 10 INJECTION INTRAMUSCULAR; INTRAVENOUS at 09:20

## 2022-11-24 RX ADMIN — BUTALBITAL, ACETAMINOPHEN AND CAFFEINE 1 CAPSULE: 300; 40; 50 CAPSULE ORAL at 00:23

## 2022-11-24 RX ADMIN — IPRATROPIUM BROMIDE AND ALBUTEROL SULFATE 3 ML: 2.5; .5 SOLUTION RESPIRATORY (INHALATION) at 19:17

## 2022-11-24 RX ADMIN — IPRATROPIUM BROMIDE AND ALBUTEROL SULFATE 3 ML: 2.5; .5 SOLUTION RESPIRATORY (INHALATION) at 01:03

## 2022-11-24 RX ADMIN — DEXAMETHASONE SODIUM PHOSPHATE 6 MG: 10 INJECTION INTRAMUSCULAR; INTRAVENOUS at 20:00

## 2022-11-24 RX ADMIN — POTASSIUM PHOSPHATE, MONOBASIC AND POTASSIUM PHOSPHATE, DIBASIC 15 MMOL: 224; 236 INJECTION, SOLUTION, CONCENTRATE INTRAVENOUS at 11:45

## 2022-11-25 LAB
ANION GAP SERPL CALCULATED.3IONS-SCNC: 9.2 MMOL/L (ref 5–15)
ARTERIAL PATENCY WRIST A: POSITIVE
ARTERIAL PATENCY WRIST A: POSITIVE
BASE EXCESS BLDA CALC-SCNC: 12.7 MMOL/L (ref -2–2)
BASE EXCESS BLDA CALC-SCNC: 9.9 MMOL/L (ref -2–2)
BASOPHILS # BLD AUTO: 0.02 10*3/MM3 (ref 0–0.2)
BASOPHILS NFR BLD AUTO: 0.2 % (ref 0–1.5)
BDY SITE: ABNORMAL
BDY SITE: ABNORMAL
BUN SERPL-MCNC: 57 MG/DL (ref 6–20)
BUN/CREAT SERPL: 47.9 (ref 7–25)
CALCIUM SPEC-SCNC: 10.1 MG/DL (ref 8.6–10.5)
CHLORIDE SERPL-SCNC: 89 MMOL/L (ref 98–107)
CO2 SERPL-SCNC: 42.8 MMOL/L (ref 22–29)
COHGB MFR BLD: 0.5 % (ref 0–1.5)
COHGB MFR BLD: 0.9 % (ref 0–1.5)
CREAT SERPL-MCNC: 1.19 MG/DL (ref 0.57–1)
DEPRECATED RDW RBC AUTO: 45.8 FL (ref 37–54)
EGFRCR SERPLBLD CKD-EPI 2021: 53.4 ML/MIN/1.73
EOSINOPHIL # BLD AUTO: 0.01 10*3/MM3 (ref 0–0.4)
EOSINOPHIL NFR BLD AUTO: 0.1 % (ref 0.3–6.2)
ERYTHROCYTE [DISTWIDTH] IN BLOOD BY AUTOMATED COUNT: 12.5 % (ref 12.3–15.4)
FHHB: 10.1 % (ref 0–5)
FHHB: 6.6 % (ref 0–5)
GLUCOSE BLDC GLUCOMTR-MCNC: 128 MG/DL (ref 70–99)
GLUCOSE BLDC GLUCOMTR-MCNC: 138 MG/DL (ref 70–99)
GLUCOSE BLDC GLUCOMTR-MCNC: 141 MG/DL (ref 70–99)
GLUCOSE BLDC GLUCOMTR-MCNC: 148 MG/DL (ref 70–99)
GLUCOSE BLDC GLUCOMTR-MCNC: 165 MG/DL (ref 70–99)
GLUCOSE SERPL-MCNC: 134 MG/DL (ref 65–99)
HCO3 BLDA-SCNC: 38 MMOL/L (ref 22–26)
HCO3 BLDA-SCNC: 43 MMOL/L (ref 22–26)
HCT VFR BLD AUTO: 43.7 % (ref 34–46.6)
HGB BLD-MCNC: 13.8 G/DL (ref 12–15.9)
HGB BLDA-MCNC: 13.3 G/DL (ref 11.7–14.6)
HGB BLDA-MCNC: 14.5 G/DL (ref 11.7–14.6)
IMM GRANULOCYTES # BLD AUTO: 0.14 10*3/MM3 (ref 0–0.05)
IMM GRANULOCYTES NFR BLD AUTO: 1.3 % (ref 0–0.5)
INHALED O2 CONCENTRATION: 42 %
INHALED O2 CONCENTRATION: 50 %
LACTATE BLDA-SCNC: ABNORMAL MMOL/L
LYMPHOCYTES # BLD AUTO: 1.54 10*3/MM3 (ref 0.7–3.1)
LYMPHOCYTES NFR BLD AUTO: 14.6 % (ref 19.6–45.3)
MAGNESIUM SERPL-MCNC: 2.1 MG/DL (ref 1.6–2.6)
MCH RBC QN AUTO: 31.3 PG (ref 26.6–33)
MCHC RBC AUTO-ENTMCNC: 31.6 G/DL (ref 31.5–35.7)
MCV RBC AUTO: 99.1 FL (ref 79–97)
METHGB BLD QL: 0.2 % (ref 0–1.5)
METHGB BLD QL: 0.3 % (ref 0–1.5)
MODALITY: ABNORMAL
MODALITY: ABNORMAL
MONOCYTES # BLD AUTO: 0.92 10*3/MM3 (ref 0.1–0.9)
MONOCYTES NFR BLD AUTO: 8.7 % (ref 5–12)
NEUTROPHILS NFR BLD AUTO: 7.95 10*3/MM3 (ref 1.7–7)
NEUTROPHILS NFR BLD AUTO: 75.1 % (ref 42.7–76)
NOTE: ABNORMAL
NOTE: ABNORMAL
NRBC BLD AUTO-RTO: 0.2 /100 WBC (ref 0–0.2)
OXYHGB MFR BLDV: 88.8 % (ref 94–99)
OXYHGB MFR BLDV: 92.6 % (ref 94–99)
PCO2 BLDA: 66.4 MM HG (ref 35–45)
PCO2 BLDA: 89.2 MM HG (ref 35–45)
PH BLDA: 7.3 PH UNITS (ref 7.35–7.45)
PH BLDA: 7.38 PH UNITS (ref 7.35–7.45)
PHOSPHATE SERPL-MCNC: 2.8 MG/DL (ref 2.5–4.5)
PLATELET # BLD AUTO: 200 10*3/MM3 (ref 140–450)
PMV BLD AUTO: 11.7 FL (ref 6–12)
PO2 BLD: 135 MM[HG] (ref 0–500)
PO2 BLD: 148 MM[HG] (ref 0–500)
PO2 BLDA: 56.6 MM HG (ref 80–100)
PO2 BLDA: 73.8 MM HG (ref 80–100)
POTASSIUM SERPL-SCNC: 4.3 MMOL/L (ref 3.5–5.2)
RBC # BLD AUTO: 4.41 10*6/MM3 (ref 3.77–5.28)
SAO2 % BLDCOA: 89.8 % (ref 95–99)
SAO2 % BLDCOA: 93.3 % (ref 95–99)
SET MECH RESP RATE: 24
SET MECH RESP RATE: 24
SODIUM SERPL-SCNC: 141 MMOL/L (ref 136–145)
WBC NRBC COR # BLD: 10.58 10*3/MM3 (ref 3.4–10.8)

## 2022-11-25 PROCEDURE — 82962 GLUCOSE BLOOD TEST: CPT

## 2022-11-25 PROCEDURE — 82375 ASSAY CARBOXYHB QUANT: CPT | Performed by: NURSE PRACTITIONER

## 2022-11-25 PROCEDURE — 82805 BLOOD GASES W/O2 SATURATION: CPT | Performed by: STUDENT IN AN ORGANIZED HEALTH CARE EDUCATION/TRAINING PROGRAM

## 2022-11-25 PROCEDURE — 94660 CPAP INITIATION&MGMT: CPT

## 2022-11-25 PROCEDURE — 25010000002 ENOXAPARIN PER 10 MG: Performed by: FAMILY MEDICINE

## 2022-11-25 PROCEDURE — 94799 UNLISTED PULMONARY SVC/PX: CPT

## 2022-11-25 PROCEDURE — 80048 BASIC METABOLIC PNL TOTAL CA: CPT | Performed by: NURSE PRACTITIONER

## 2022-11-25 PROCEDURE — 83735 ASSAY OF MAGNESIUM: CPT | Performed by: NURSE PRACTITIONER

## 2022-11-25 PROCEDURE — 84100 ASSAY OF PHOSPHORUS: CPT | Performed by: NURSE PRACTITIONER

## 2022-11-25 PROCEDURE — 83050 HGB METHEMOGLOBIN QUAN: CPT | Performed by: STUDENT IN AN ORGANIZED HEALTH CARE EDUCATION/TRAINING PROGRAM

## 2022-11-25 PROCEDURE — 25010000002 DEXAMETHASONE PER 1 MG: Performed by: NURSE PRACTITIONER

## 2022-11-25 PROCEDURE — 94761 N-INVAS EAR/PLS OXIMETRY MLT: CPT

## 2022-11-25 PROCEDURE — 36600 WITHDRAWAL OF ARTERIAL BLOOD: CPT | Performed by: STUDENT IN AN ORGANIZED HEALTH CARE EDUCATION/TRAINING PROGRAM

## 2022-11-25 PROCEDURE — 85025 COMPLETE CBC W/AUTO DIFF WBC: CPT | Performed by: NURSE PRACTITIONER

## 2022-11-25 PROCEDURE — 99233 SBSQ HOSP IP/OBS HIGH 50: CPT | Performed by: INTERNAL MEDICINE

## 2022-11-25 PROCEDURE — 99291 CRITICAL CARE FIRST HOUR: CPT | Performed by: STUDENT IN AN ORGANIZED HEALTH CARE EDUCATION/TRAINING PROGRAM

## 2022-11-25 PROCEDURE — 82805 BLOOD GASES W/O2 SATURATION: CPT | Performed by: NURSE PRACTITIONER

## 2022-11-25 PROCEDURE — 92610 EVALUATE SWALLOWING FUNCTION: CPT

## 2022-11-25 PROCEDURE — 63710000001 INSULIN REGULAR HUMAN PER 5 UNITS: Performed by: INTERNAL MEDICINE

## 2022-11-25 PROCEDURE — 25010000002 ONDANSETRON PER 1 MG: Performed by: INTERNAL MEDICINE

## 2022-11-25 PROCEDURE — 36600 WITHDRAWAL OF ARTERIAL BLOOD: CPT | Performed by: NURSE PRACTITIONER

## 2022-11-25 PROCEDURE — 82375 ASSAY CARBOXYHB QUANT: CPT | Performed by: STUDENT IN AN ORGANIZED HEALTH CARE EDUCATION/TRAINING PROGRAM

## 2022-11-25 PROCEDURE — 83050 HGB METHEMOGLOBIN QUAN: CPT | Performed by: NURSE PRACTITIONER

## 2022-11-25 RX ORDER — SERTRALINE HYDROCHLORIDE 100 MG/1
200 TABLET, FILM COATED ORAL DAILY
Status: DISCONTINUED | OUTPATIENT
Start: 2022-11-26 | End: 2022-11-30

## 2022-11-25 RX ORDER — BUSPIRONE HYDROCHLORIDE 5 MG/1
5 TABLET ORAL DAILY
Status: DISCONTINUED | OUTPATIENT
Start: 2022-11-26 | End: 2022-11-30

## 2022-11-25 RX ORDER — MONTELUKAST SODIUM 10 MG/1
10 TABLET ORAL DAILY
Status: DISCONTINUED | OUTPATIENT
Start: 2022-11-26 | End: 2022-11-30

## 2022-11-25 RX ORDER — ATENOLOL 50 MG/1
50 TABLET ORAL
Status: DISCONTINUED | OUTPATIENT
Start: 2022-11-26 | End: 2022-11-28

## 2022-11-25 RX ORDER — DIVALPROEX SODIUM 125 MG/1
125 CAPSULE, COATED PELLETS ORAL EVERY 12 HOURS SCHEDULED
Status: DISCONTINUED | OUTPATIENT
Start: 2022-11-26 | End: 2022-11-30

## 2022-11-25 RX ORDER — DEXAMETHASONE SODIUM PHOSPHATE 10 MG/ML
6 INJECTION INTRAMUSCULAR; INTRAVENOUS DAILY
Status: DISCONTINUED | OUTPATIENT
Start: 2022-11-26 | End: 2022-12-01

## 2022-11-25 RX ORDER — SPIRONOLACTONE 25 MG/1
25 TABLET ORAL DAILY
Status: DISCONTINUED | OUTPATIENT
Start: 2022-11-26 | End: 2022-11-30

## 2022-11-25 RX ORDER — LISINOPRIL 10 MG/1
10 TABLET ORAL DAILY
Status: DISCONTINUED | OUTPATIENT
Start: 2022-11-26 | End: 2022-11-28

## 2022-11-25 RX ORDER — NICOTINE POLACRILEX 4 MG
15 LOZENGE BUCCAL
Status: DISCONTINUED | OUTPATIENT
Start: 2022-11-25 | End: 2022-11-28

## 2022-11-25 RX ORDER — ASPIRIN 81 MG/1
81 TABLET, CHEWABLE ORAL DAILY
Status: DISCONTINUED | OUTPATIENT
Start: 2022-11-26 | End: 2022-11-30

## 2022-11-25 RX ORDER — BUTALBITAL, ACETAMINOPHEN AND CAFFEINE 300; 40; 50 MG/1; MG/1; MG/1
1 CAPSULE ORAL EVERY 6 HOURS PRN
Status: DISCONTINUED | OUTPATIENT
Start: 2022-11-25 | End: 2022-11-30

## 2022-11-25 RX ORDER — LEVOTHYROXINE SODIUM 0.12 MG/1
125 TABLET ORAL
Status: DISCONTINUED | OUTPATIENT
Start: 2022-11-26 | End: 2022-11-30

## 2022-11-25 RX ORDER — ACETAMINOPHEN 325 MG/1
650 TABLET ORAL EVERY 4 HOURS PRN
Status: DISCONTINUED | OUTPATIENT
Start: 2022-11-25 | End: 2022-11-26

## 2022-11-25 RX ORDER — DIVALPROEX SODIUM 250 MG/1
250 TABLET, DELAYED RELEASE ORAL EVERY 12 HOURS SCHEDULED
Status: DISCONTINUED | OUTPATIENT
Start: 2022-11-25 | End: 2022-11-25 | Stop reason: ALTCHOICE

## 2022-11-25 RX ORDER — ALPRAZOLAM 0.25 MG/1
0.5 TABLET ORAL 3 TIMES DAILY PRN
Status: DISCONTINUED | OUTPATIENT
Start: 2022-11-25 | End: 2022-11-27

## 2022-11-25 RX ADMIN — ALPRAZOLAM 0.5 MG: 0.25 TABLET ORAL at 20:13

## 2022-11-25 RX ADMIN — BUTALBITAL, ACETAMINOPHEN AND CAFFEINE 1 CAPSULE: 300; 40; 50 CAPSULE ORAL at 00:48

## 2022-11-25 RX ADMIN — ARFORMOTEROL TARTRATE INHALATION 15 MCG: 15 SOLUTION RESPIRATORY (INHALATION) at 19:15

## 2022-11-25 RX ADMIN — ALPRAZOLAM 0.5 MG: 0.25 TABLET ORAL at 08:35

## 2022-11-25 RX ADMIN — MONTELUKAST 10 MG: 10 TABLET, FILM COATED ORAL at 08:43

## 2022-11-25 RX ADMIN — BUDESONIDE 0.25 MG: 0.25 INHALANT ORAL at 19:15

## 2022-11-25 RX ADMIN — LEVOTHYROXINE SODIUM 125 MCG: 125 TABLET ORAL at 06:04

## 2022-11-25 RX ADMIN — IPRATROPIUM BROMIDE AND ALBUTEROL SULFATE 3 ML: 2.5; .5 SOLUTION RESPIRATORY (INHALATION) at 00:21

## 2022-11-25 RX ADMIN — DEXAMETHASONE SODIUM PHOSPHATE 6 MG: 10 INJECTION INTRAMUSCULAR; INTRAVENOUS at 08:36

## 2022-11-25 RX ADMIN — SERTRALINE HYDROCHLORIDE 200 MG: 100 TABLET ORAL at 08:43

## 2022-11-25 RX ADMIN — ACETAMINOPHEN 650 MG: 325 TABLET ORAL at 08:36

## 2022-11-25 RX ADMIN — ARFORMOTEROL TARTRATE INHALATION 15 MCG: 15 SOLUTION RESPIRATORY (INHALATION) at 06:34

## 2022-11-25 RX ADMIN — IPRATROPIUM BROMIDE AND ALBUTEROL SULFATE 3 ML: 2.5; .5 SOLUTION RESPIRATORY (INHALATION) at 06:35

## 2022-11-25 RX ADMIN — BUTALBITAL, ACETAMINOPHEN AND CAFFEINE 1 CAPSULE: 300; 40; 50 CAPSULE ORAL at 19:57

## 2022-11-25 RX ADMIN — Medication 10 ML: at 08:37

## 2022-11-25 RX ADMIN — SPIRONOLACTONE 25 MG: 25 TABLET ORAL at 08:43

## 2022-11-25 RX ADMIN — BUDESONIDE 0.25 MG: 0.25 INHALANT ORAL at 06:34

## 2022-11-25 RX ADMIN — BUTALBITAL, ACETAMINOPHEN AND CAFFEINE 1 CAPSULE: 300; 40; 50 CAPSULE ORAL at 12:54

## 2022-11-25 RX ADMIN — ASPIRIN 81 MG CHEWABLE TABLET 81 MG: 81 TABLET CHEWABLE at 08:36

## 2022-11-25 RX ADMIN — ENOXAPARIN SODIUM 40 MG: 100 INJECTION SUBCUTANEOUS at 08:35

## 2022-11-25 RX ADMIN — IPRATROPIUM BROMIDE AND ALBUTEROL SULFATE 3 ML: 2.5; .5 SOLUTION RESPIRATORY (INHALATION) at 11:57

## 2022-11-25 RX ADMIN — INSULIN HUMAN 2 UNITS: 100 INJECTION, SOLUTION PARENTERAL at 00:02

## 2022-11-25 RX ADMIN — IPRATROPIUM BROMIDE AND ALBUTEROL SULFATE 3 ML: 2.5; .5 SOLUTION RESPIRATORY (INHALATION) at 19:15

## 2022-11-25 RX ADMIN — ONDANSETRON 4 MG: 2 INJECTION INTRAMUSCULAR; INTRAVENOUS at 08:47

## 2022-11-25 RX ADMIN — BUSPIRONE HYDROCHLORIDE 5 MG: 5 TABLET ORAL at 08:36

## 2022-11-25 RX ADMIN — DIVALPROEX SODIUM 250 MG: 250 TABLET, DELAYED RELEASE ORAL at 08:43

## 2022-11-26 ENCOUNTER — APPOINTMENT (OUTPATIENT)
Dept: CARDIOLOGY | Facility: HOSPITAL | Age: 57
End: 2022-11-26

## 2022-11-26 LAB
ANION GAP SERPL CALCULATED.3IONS-SCNC: 5.2 MMOL/L (ref 5–15)
BH CV ECHO MEAS - AO ROOT DIAM: 3.4 CM
BH CV ECHO MEAS - EDV(MOD-SP2): 40 ML
BH CV ECHO MEAS - EDV(MOD-SP4): 40 ML
BH CV ECHO MEAS - EF(MOD-BP): 52.4 %
BH CV ECHO MEAS - ESV(MOD-SP2): 19 ML
BH CV ECHO MEAS - ESV(MOD-SP4): 19 ML
BH CV ECHO MEAS - IVSD: 1.2 CM
BH CV ECHO MEAS - LA DIMENSION: 2.7 CM
BH CV ECHO MEAS - LAT PEAK E' VEL: 6.5 CM/SEC
BH CV ECHO MEAS - LVIDD: 4.1 CM
BH CV ECHO MEAS - LVIDS: 2.5 CM
BH CV ECHO MEAS - LVOT DIAM: 2 CM
BH CV ECHO MEAS - LVPWD: 1.2 CM
BH CV ECHO MEAS - MED PEAK E' VEL: 5.98 CM/SEC
BH CV ECHO MEAS - MV A MAX VEL: 92 CM/SEC
BH CV ECHO MEAS - MV DEC TIME: 173 MSEC
BH CV ECHO MEAS - MV E MAX VEL: 83 CM/SEC
BH CV ECHO MEAS - MV E/A: 0.9
BH CV ECHO MEAS - RVDD: 2.9 CM
BH CV ECHO MEAS - TAPSE (>1.6): 1.75 CM
BH CV ECHO MEASUREMENTS AVERAGE E/E' RATIO: 13.3
BUN SERPL-MCNC: 40 MG/DL (ref 6–20)
BUN/CREAT SERPL: 59.7 (ref 7–25)
CALCIUM SPEC-SCNC: 10.1 MG/DL (ref 8.6–10.5)
CHLORIDE SERPL-SCNC: 89 MMOL/L (ref 98–107)
CO2 SERPL-SCNC: 44.8 MMOL/L (ref 22–29)
CREAT SERPL-MCNC: 0.67 MG/DL (ref 0.57–1)
DEPRECATED RDW RBC AUTO: 45.7 FL (ref 37–54)
EGFRCR SERPLBLD CKD-EPI 2021: 102.1 ML/MIN/1.73
ERYTHROCYTE [DISTWIDTH] IN BLOOD BY AUTOMATED COUNT: 12.4 % (ref 12.3–15.4)
GLUCOSE BLDC GLUCOMTR-MCNC: 127 MG/DL (ref 70–99)
GLUCOSE BLDC GLUCOMTR-MCNC: 127 MG/DL (ref 70–99)
GLUCOSE BLDC GLUCOMTR-MCNC: 130 MG/DL (ref 70–99)
GLUCOSE BLDC GLUCOMTR-MCNC: 169 MG/DL (ref 70–99)
GLUCOSE BLDC GLUCOMTR-MCNC: 184 MG/DL (ref 70–99)
GLUCOSE SERPL-MCNC: 130 MG/DL (ref 65–99)
HCT VFR BLD AUTO: 42.7 % (ref 34–46.6)
HGB BLD-MCNC: 13.6 G/DL (ref 12–15.9)
IVRT: 61 MSEC
LEFT ATRIUM VOLUME INDEX: 14.8 ML/M2
MAGNESIUM SERPL-MCNC: 2 MG/DL (ref 1.6–2.6)
MAXIMAL PREDICTED HEART RATE: 163 BPM
MCH RBC QN AUTO: 31.9 PG (ref 26.6–33)
MCHC RBC AUTO-ENTMCNC: 31.9 G/DL (ref 31.5–35.7)
MCV RBC AUTO: 100 FL (ref 79–97)
PLATELET # BLD AUTO: 177 10*3/MM3 (ref 140–450)
PMV BLD AUTO: 11.9 FL (ref 6–12)
POTASSIUM SERPL-SCNC: 3.9 MMOL/L (ref 3.5–5.2)
RBC # BLD AUTO: 4.27 10*6/MM3 (ref 3.77–5.28)
SODIUM SERPL-SCNC: 139 MMOL/L (ref 136–145)
STRESS TARGET HR: 139 BPM
WBC NRBC COR # BLD: 11.42 10*3/MM3 (ref 3.4–10.8)

## 2022-11-26 PROCEDURE — 93306 TTE W/DOPPLER COMPLETE: CPT

## 2022-11-26 PROCEDURE — 94799 UNLISTED PULMONARY SVC/PX: CPT

## 2022-11-26 PROCEDURE — 82962 GLUCOSE BLOOD TEST: CPT

## 2022-11-26 PROCEDURE — 80048 BASIC METABOLIC PNL TOTAL CA: CPT | Performed by: NURSE PRACTITIONER

## 2022-11-26 PROCEDURE — 25010000002 ONDANSETRON PER 1 MG: Performed by: INTERNAL MEDICINE

## 2022-11-26 PROCEDURE — 25010000002 ENOXAPARIN PER 10 MG: Performed by: FAMILY MEDICINE

## 2022-11-26 PROCEDURE — 25010000002 DEXAMETHASONE PER 1 MG: Performed by: NURSE PRACTITIONER

## 2022-11-26 PROCEDURE — 83735 ASSAY OF MAGNESIUM: CPT | Performed by: NURSE PRACTITIONER

## 2022-11-26 PROCEDURE — 93306 TTE W/DOPPLER COMPLETE: CPT | Performed by: INTERNAL MEDICINE

## 2022-11-26 PROCEDURE — 85027 COMPLETE CBC AUTOMATED: CPT | Performed by: NURSE PRACTITIONER

## 2022-11-26 PROCEDURE — 63710000001 INSULIN REGULAR HUMAN PER 5 UNITS: Performed by: INTERNAL MEDICINE

## 2022-11-26 PROCEDURE — 99233 SBSQ HOSP IP/OBS HIGH 50: CPT | Performed by: FAMILY MEDICINE

## 2022-11-26 PROCEDURE — 94660 CPAP INITIATION&MGMT: CPT

## 2022-11-26 PROCEDURE — 99291 CRITICAL CARE FIRST HOUR: CPT | Performed by: STUDENT IN AN ORGANIZED HEALTH CARE EDUCATION/TRAINING PROGRAM

## 2022-11-26 RX ORDER — ACETAMINOPHEN 325 MG/1
650 TABLET ORAL EVERY 6 HOURS PRN
Status: DISCONTINUED | OUTPATIENT
Start: 2022-11-26 | End: 2022-11-26

## 2022-11-26 RX ORDER — LIDOCAINE 50 MG/G
1 PATCH TOPICAL
Status: COMPLETED | OUTPATIENT
Start: 2022-11-26 | End: 2022-11-26

## 2022-11-26 RX ORDER — ACETAMINOPHEN 500 MG
1000 TABLET ORAL EVERY 8 HOURS PRN
Status: DISCONTINUED | OUTPATIENT
Start: 2022-11-26 | End: 2022-11-26

## 2022-11-26 RX ORDER — ACETAMINOPHEN 325 MG/1
650 TABLET ORAL EVERY 6 HOURS PRN
Status: DISCONTINUED | OUTPATIENT
Start: 2022-11-26 | End: 2022-11-30

## 2022-11-26 RX ORDER — ACETAMINOPHEN 500 MG
1000 TABLET ORAL EVERY 8 HOURS PRN
Status: DISCONTINUED | OUTPATIENT
Start: 2022-11-26 | End: 2022-11-30

## 2022-11-26 RX ADMIN — ATENOLOL 50 MG: 50 TABLET ORAL at 09:11

## 2022-11-26 RX ADMIN — LIDOCAINE 1 PATCH: 50 PATCH CUTANEOUS at 09:05

## 2022-11-26 RX ADMIN — INSULIN HUMAN 2 UNITS: 100 INJECTION, SOLUTION PARENTERAL at 12:09

## 2022-11-26 RX ADMIN — DIVALPROEX SODIUM 125 MG: 125 CAPSULE, COATED PELLETS ORAL at 20:35

## 2022-11-26 RX ADMIN — BUDESONIDE 0.25 MG: 0.25 INHALANT ORAL at 07:11

## 2022-11-26 RX ADMIN — MONTELUKAST 10 MG: 10 TABLET, FILM COATED ORAL at 09:08

## 2022-11-26 RX ADMIN — IPRATROPIUM BROMIDE AND ALBUTEROL SULFATE 3 ML: 2.5; .5 SOLUTION RESPIRATORY (INHALATION) at 18:19

## 2022-11-26 RX ADMIN — ALPRAZOLAM 0.5 MG: 0.25 TABLET ORAL at 04:13

## 2022-11-26 RX ADMIN — LISINOPRIL 10 MG: 10 TABLET ORAL at 09:10

## 2022-11-26 RX ADMIN — BUTALBITAL, ACETAMINOPHEN AND CAFFEINE 1 CAPSULE: 300; 40; 50 CAPSULE ORAL at 17:15

## 2022-11-26 RX ADMIN — ONDANSETRON 4 MG: 2 INJECTION INTRAMUSCULAR; INTRAVENOUS at 20:43

## 2022-11-26 RX ADMIN — ONDANSETRON 4 MG: 2 INJECTION INTRAMUSCULAR; INTRAVENOUS at 12:09

## 2022-11-26 RX ADMIN — IPRATROPIUM BROMIDE AND ALBUTEROL SULFATE 3 ML: 2.5; .5 SOLUTION RESPIRATORY (INHALATION) at 00:00

## 2022-11-26 RX ADMIN — DEXAMETHASONE SODIUM PHOSPHATE 6 MG: 10 INJECTION INTRAMUSCULAR; INTRAVENOUS at 09:14

## 2022-11-26 RX ADMIN — INSULIN HUMAN 2 UNITS: 100 INJECTION, SOLUTION PARENTERAL at 18:57

## 2022-11-26 RX ADMIN — LEVOTHYROXINE SODIUM 125 MCG: 125 TABLET ORAL at 06:18

## 2022-11-26 RX ADMIN — ASPIRIN 81 MG CHEWABLE TABLET 81 MG: 81 TABLET CHEWABLE at 09:08

## 2022-11-26 RX ADMIN — ARFORMOTEROL TARTRATE INHALATION 15 MCG: 15 SOLUTION RESPIRATORY (INHALATION) at 07:11

## 2022-11-26 RX ADMIN — ENOXAPARIN SODIUM 40 MG: 100 INJECTION SUBCUTANEOUS at 09:06

## 2022-11-26 RX ADMIN — BUTALBITAL, ACETAMINOPHEN AND CAFFEINE 1 CAPSULE: 300; 40; 50 CAPSULE ORAL at 01:57

## 2022-11-26 RX ADMIN — ARFORMOTEROL TARTRATE INHALATION 15 MCG: 15 SOLUTION RESPIRATORY (INHALATION) at 18:19

## 2022-11-26 RX ADMIN — SERTRALINE HYDROCHLORIDE 200 MG: 100 TABLET ORAL at 09:10

## 2022-11-26 RX ADMIN — BUDESONIDE 0.25 MG: 0.25 INHALANT ORAL at 18:19

## 2022-11-26 RX ADMIN — IPRATROPIUM BROMIDE AND ALBUTEROL SULFATE 3 ML: 2.5; .5 SOLUTION RESPIRATORY (INHALATION) at 07:11

## 2022-11-26 RX ADMIN — DIVALPROEX SODIUM 125 MG: 125 CAPSULE, COATED PELLETS ORAL at 09:07

## 2022-11-26 RX ADMIN — SPIRONOLACTONE 25 MG: 25 TABLET ORAL at 09:08

## 2022-11-26 RX ADMIN — BUTALBITAL, ACETAMINOPHEN AND CAFFEINE 1 CAPSULE: 300; 40; 50 CAPSULE ORAL at 23:59

## 2022-11-26 RX ADMIN — IPRATROPIUM BROMIDE AND ALBUTEROL SULFATE 3 ML: 2.5; .5 SOLUTION RESPIRATORY (INHALATION) at 11:49

## 2022-11-27 LAB
ANION GAP SERPL CALCULATED.3IONS-SCNC: 1.1 MMOL/L (ref 5–15)
BUN SERPL-MCNC: 31 MG/DL (ref 6–20)
BUN/CREAT SERPL: 50.8 (ref 7–25)
CALCIUM SPEC-SCNC: 10.5 MG/DL (ref 8.6–10.5)
CHLORIDE SERPL-SCNC: 88 MMOL/L (ref 98–107)
CO2 SERPL-SCNC: 49.9 MMOL/L (ref 22–29)
CREAT SERPL-MCNC: 0.61 MG/DL (ref 0.57–1)
DEPRECATED RDW RBC AUTO: 43.8 FL (ref 37–54)
EGFRCR SERPLBLD CKD-EPI 2021: 104.4 ML/MIN/1.73
ERYTHROCYTE [DISTWIDTH] IN BLOOD BY AUTOMATED COUNT: 12.1 % (ref 12.3–15.4)
GLUCOSE BLDC GLUCOMTR-MCNC: 171 MG/DL (ref 70–99)
GLUCOSE BLDC GLUCOMTR-MCNC: 175 MG/DL (ref 70–99)
GLUCOSE BLDC GLUCOMTR-MCNC: 183 MG/DL (ref 70–99)
GLUCOSE BLDC GLUCOMTR-MCNC: 187 MG/DL (ref 70–99)
GLUCOSE SERPL-MCNC: 143 MG/DL (ref 65–99)
HCT VFR BLD AUTO: 44.8 % (ref 34–46.6)
HGB BLD-MCNC: 14.5 G/DL (ref 12–15.9)
MAGNESIUM SERPL-MCNC: 1.9 MG/DL (ref 1.6–2.6)
MCH RBC QN AUTO: 31.6 PG (ref 26.6–33)
MCHC RBC AUTO-ENTMCNC: 32.4 G/DL (ref 31.5–35.7)
MCV RBC AUTO: 97.6 FL (ref 79–97)
PLATELET # BLD AUTO: 185 10*3/MM3 (ref 140–450)
PMV BLD AUTO: 11.7 FL (ref 6–12)
POTASSIUM SERPL-SCNC: 4.2 MMOL/L (ref 3.5–5.2)
PROCALCITONIN SERPL-MCNC: 0.03 NG/ML (ref 0–0.25)
RBC # BLD AUTO: 4.59 10*6/MM3 (ref 3.77–5.28)
SODIUM SERPL-SCNC: 139 MMOL/L (ref 136–145)
WBC NRBC COR # BLD: 12.59 10*3/MM3 (ref 3.4–10.8)

## 2022-11-27 PROCEDURE — 94799 UNLISTED PULMONARY SVC/PX: CPT

## 2022-11-27 PROCEDURE — 84145 PROCALCITONIN (PCT): CPT | Performed by: FAMILY MEDICINE

## 2022-11-27 PROCEDURE — 99233 SBSQ HOSP IP/OBS HIGH 50: CPT | Performed by: STUDENT IN AN ORGANIZED HEALTH CARE EDUCATION/TRAINING PROGRAM

## 2022-11-27 PROCEDURE — 25010000002 DEXAMETHASONE PER 1 MG: Performed by: NURSE PRACTITIONER

## 2022-11-27 PROCEDURE — 83735 ASSAY OF MAGNESIUM: CPT | Performed by: NURSE PRACTITIONER

## 2022-11-27 PROCEDURE — 94761 N-INVAS EAR/PLS OXIMETRY MLT: CPT

## 2022-11-27 PROCEDURE — 80048 BASIC METABOLIC PNL TOTAL CA: CPT | Performed by: NURSE PRACTITIONER

## 2022-11-27 PROCEDURE — 82962 GLUCOSE BLOOD TEST: CPT

## 2022-11-27 PROCEDURE — 85027 COMPLETE CBC AUTOMATED: CPT | Performed by: NURSE PRACTITIONER

## 2022-11-27 PROCEDURE — 25010000002 ONDANSETRON PER 1 MG: Performed by: INTERNAL MEDICINE

## 2022-11-27 PROCEDURE — 25010000002 ENOXAPARIN PER 10 MG: Performed by: FAMILY MEDICINE

## 2022-11-27 PROCEDURE — 99233 SBSQ HOSP IP/OBS HIGH 50: CPT | Performed by: FAMILY MEDICINE

## 2022-11-27 PROCEDURE — 63710000001 INSULIN REGULAR HUMAN PER 5 UNITS: Performed by: INTERNAL MEDICINE

## 2022-11-27 PROCEDURE — 94760 N-INVAS EAR/PLS OXIMETRY 1: CPT

## 2022-11-27 PROCEDURE — 94664 DEMO&/EVAL PT USE INHALER: CPT

## 2022-11-27 RX ORDER — ALPRAZOLAM 0.25 MG/1
0.5 TABLET ORAL 4 TIMES DAILY PRN
Status: DISCONTINUED | OUTPATIENT
Start: 2022-11-27 | End: 2022-11-30

## 2022-11-27 RX ADMIN — INSULIN HUMAN 2 UNITS: 100 INJECTION, SOLUTION PARENTERAL at 05:48

## 2022-11-27 RX ADMIN — LISINOPRIL 10 MG: 10 TABLET ORAL at 09:16

## 2022-11-27 RX ADMIN — DEXAMETHASONE SODIUM PHOSPHATE 6 MG: 10 INJECTION INTRAMUSCULAR; INTRAVENOUS at 09:16

## 2022-11-27 RX ADMIN — PHENOL 3 SPRAY: 1.5 LIQUID ORAL at 09:22

## 2022-11-27 RX ADMIN — ONDANSETRON 4 MG: 2 INJECTION INTRAMUSCULAR; INTRAVENOUS at 06:15

## 2022-11-27 RX ADMIN — INSULIN HUMAN 2 UNITS: 100 INJECTION, SOLUTION PARENTERAL at 17:21

## 2022-11-27 RX ADMIN — BUTALBITAL, ACETAMINOPHEN AND CAFFEINE 1 CAPSULE: 300; 40; 50 CAPSULE ORAL at 06:09

## 2022-11-27 RX ADMIN — ARFORMOTEROL TARTRATE INHALATION 15 MCG: 15 SOLUTION RESPIRATORY (INHALATION) at 18:17

## 2022-11-27 RX ADMIN — ENOXAPARIN SODIUM 40 MG: 100 INJECTION SUBCUTANEOUS at 09:17

## 2022-11-27 RX ADMIN — IPRATROPIUM BROMIDE AND ALBUTEROL SULFATE 3 ML: 2.5; .5 SOLUTION RESPIRATORY (INHALATION) at 06:18

## 2022-11-27 RX ADMIN — BUDESONIDE 0.25 MG: 0.25 INHALANT ORAL at 06:18

## 2022-11-27 RX ADMIN — DIVALPROEX SODIUM 125 MG: 125 CAPSULE, COATED PELLETS ORAL at 20:53

## 2022-11-27 RX ADMIN — IPRATROPIUM BROMIDE AND ALBUTEROL SULFATE 3 ML: 2.5; .5 SOLUTION RESPIRATORY (INHALATION) at 00:11

## 2022-11-27 RX ADMIN — ARFORMOTEROL TARTRATE INHALATION 15 MCG: 15 SOLUTION RESPIRATORY (INHALATION) at 06:18

## 2022-11-27 RX ADMIN — MONTELUKAST 10 MG: 10 TABLET, FILM COATED ORAL at 09:16

## 2022-11-27 RX ADMIN — ASPIRIN 81 MG CHEWABLE TABLET 81 MG: 81 TABLET CHEWABLE at 09:16

## 2022-11-27 RX ADMIN — ALPRAZOLAM 0.5 MG: 0.25 TABLET ORAL at 16:26

## 2022-11-27 RX ADMIN — IPRATROPIUM BROMIDE AND ALBUTEROL SULFATE 3 ML: 2.5; .5 SOLUTION RESPIRATORY (INHALATION) at 18:18

## 2022-11-27 RX ADMIN — PHENOL 3 SPRAY: 1.5 LIQUID ORAL at 16:26

## 2022-11-27 RX ADMIN — ATENOLOL 50 MG: 50 TABLET ORAL at 09:16

## 2022-11-27 RX ADMIN — ALPRAZOLAM 0.5 MG: 0.25 TABLET ORAL at 06:09

## 2022-11-27 RX ADMIN — SERTRALINE HYDROCHLORIDE 200 MG: 100 TABLET ORAL at 09:15

## 2022-11-27 RX ADMIN — BUDESONIDE 0.25 MG: 0.25 INHALANT ORAL at 18:17

## 2022-11-27 RX ADMIN — LEVOTHYROXINE SODIUM 125 MCG: 125 TABLET ORAL at 05:44

## 2022-11-27 RX ADMIN — SPIRONOLACTONE 25 MG: 25 TABLET ORAL at 09:15

## 2022-11-27 RX ADMIN — BUTALBITAL, ACETAMINOPHEN AND CAFFEINE 1 CAPSULE: 300; 40; 50 CAPSULE ORAL at 17:49

## 2022-11-27 RX ADMIN — INSULIN HUMAN 2 UNITS: 100 INJECTION, SOLUTION PARENTERAL at 12:13

## 2022-11-27 RX ADMIN — IPRATROPIUM BROMIDE AND ALBUTEROL SULFATE 3 ML: 2.5; .5 SOLUTION RESPIRATORY (INHALATION) at 11:44

## 2022-11-27 RX ADMIN — DIVALPROEX SODIUM 125 MG: 125 CAPSULE, COATED PELLETS ORAL at 09:16

## 2022-11-28 LAB
ANION GAP SERPL CALCULATED.3IONS-SCNC: 5 MMOL/L (ref 5–15)
BUN SERPL-MCNC: 41 MG/DL (ref 6–20)
BUN/CREAT SERPL: 63.1 (ref 7–25)
CALCIUM SPEC-SCNC: 11 MG/DL (ref 8.6–10.5)
CHLORIDE SERPL-SCNC: 84 MMOL/L (ref 98–107)
CO2 SERPL-SCNC: 48 MMOL/L (ref 22–29)
CREAT SERPL-MCNC: 0.65 MG/DL (ref 0.57–1)
DEPRECATED RDW RBC AUTO: 43.6 FL (ref 37–54)
EGFRCR SERPLBLD CKD-EPI 2021: 102.8 ML/MIN/1.73
ERYTHROCYTE [DISTWIDTH] IN BLOOD BY AUTOMATED COUNT: 12 % (ref 12.3–15.4)
GLUCOSE BLDC GLUCOMTR-MCNC: 147 MG/DL (ref 70–99)
GLUCOSE BLDC GLUCOMTR-MCNC: 148 MG/DL (ref 70–99)
GLUCOSE BLDC GLUCOMTR-MCNC: 194 MG/DL (ref 70–99)
GLUCOSE BLDC GLUCOMTR-MCNC: 196 MG/DL (ref 70–99)
GLUCOSE SERPL-MCNC: 186 MG/DL (ref 65–99)
HCT VFR BLD AUTO: 47.7 % (ref 34–46.6)
HGB BLD-MCNC: 15.1 G/DL (ref 12–15.9)
MAGNESIUM SERPL-MCNC: 2 MG/DL (ref 1.6–2.6)
MCH RBC QN AUTO: 31.1 PG (ref 26.6–33)
MCHC RBC AUTO-ENTMCNC: 31.7 G/DL (ref 31.5–35.7)
MCV RBC AUTO: 98.4 FL (ref 79–97)
PLATELET # BLD AUTO: 182 10*3/MM3 (ref 140–450)
PMV BLD AUTO: 12.3 FL (ref 6–12)
POTASSIUM SERPL-SCNC: 4.1 MMOL/L (ref 3.5–5.2)
RBC # BLD AUTO: 4.85 10*6/MM3 (ref 3.77–5.28)
SODIUM SERPL-SCNC: 137 MMOL/L (ref 136–145)
WBC NRBC COR # BLD: 12.94 10*3/MM3 (ref 3.4–10.8)

## 2022-11-28 PROCEDURE — 82962 GLUCOSE BLOOD TEST: CPT

## 2022-11-28 PROCEDURE — 25010000002 ENOXAPARIN PER 10 MG: Performed by: FAMILY MEDICINE

## 2022-11-28 PROCEDURE — 99291 CRITICAL CARE FIRST HOUR: CPT | Performed by: INTERNAL MEDICINE

## 2022-11-28 PROCEDURE — 94660 CPAP INITIATION&MGMT: CPT

## 2022-11-28 PROCEDURE — 63710000001 INSULIN REGULAR HUMAN PER 5 UNITS: Performed by: INTERNAL MEDICINE

## 2022-11-28 PROCEDURE — 25010000002 DEXAMETHASONE PER 1 MG: Performed by: NURSE PRACTITIONER

## 2022-11-28 PROCEDURE — 94799 UNLISTED PULMONARY SVC/PX: CPT

## 2022-11-28 PROCEDURE — 25010000002 ONDANSETRON PER 1 MG: Performed by: INTERNAL MEDICINE

## 2022-11-28 PROCEDURE — 85027 COMPLETE CBC AUTOMATED: CPT | Performed by: NURSE PRACTITIONER

## 2022-11-28 PROCEDURE — 83735 ASSAY OF MAGNESIUM: CPT | Performed by: NURSE PRACTITIONER

## 2022-11-28 PROCEDURE — 80048 BASIC METABOLIC PNL TOTAL CA: CPT | Performed by: NURSE PRACTITIONER

## 2022-11-28 PROCEDURE — 99233 SBSQ HOSP IP/OBS HIGH 50: CPT | Performed by: FAMILY MEDICINE

## 2022-11-28 RX ORDER — LISINOPRIL 5 MG/1
5 TABLET ORAL DAILY
Status: DISCONTINUED | OUTPATIENT
Start: 2022-11-29 | End: 2022-11-28

## 2022-11-28 RX ORDER — MIDODRINE HYDROCHLORIDE 2.5 MG/1
5 TABLET ORAL
Status: DISCONTINUED | OUTPATIENT
Start: 2022-11-28 | End: 2022-11-29

## 2022-11-28 RX ORDER — ATENOLOL 25 MG/1
25 TABLET ORAL
Status: DISCONTINUED | OUTPATIENT
Start: 2022-11-29 | End: 2022-11-28

## 2022-11-28 RX ORDER — ATENOLOL 50 MG/1
50 TABLET ORAL
Status: DISCONTINUED | OUTPATIENT
Start: 2022-11-29 | End: 2022-11-30

## 2022-11-28 RX ORDER — NICOTINE POLACRILEX 4 MG
15 LOZENGE BUCCAL
Status: DISCONTINUED | OUTPATIENT
Start: 2022-11-28 | End: 2022-12-01

## 2022-11-28 RX ADMIN — LEVOTHYROXINE SODIUM 125 MCG: 125 TABLET ORAL at 06:47

## 2022-11-28 RX ADMIN — IPRATROPIUM BROMIDE AND ALBUTEROL SULFATE 3 ML: 2.5; .5 SOLUTION RESPIRATORY (INHALATION) at 06:25

## 2022-11-28 RX ADMIN — ONDANSETRON 4 MG: 2 INJECTION INTRAMUSCULAR; INTRAVENOUS at 00:07

## 2022-11-28 RX ADMIN — DIVALPROEX SODIUM 125 MG: 125 CAPSULE, COATED PELLETS ORAL at 09:44

## 2022-11-28 RX ADMIN — SERTRALINE HYDROCHLORIDE 200 MG: 100 TABLET ORAL at 09:44

## 2022-11-28 RX ADMIN — SPIRONOLACTONE 25 MG: 25 TABLET ORAL at 09:44

## 2022-11-28 RX ADMIN — INSULIN HUMAN 2 UNITS: 100 INJECTION, SOLUTION PARENTERAL at 17:28

## 2022-11-28 RX ADMIN — IPRATROPIUM BROMIDE AND ALBUTEROL SULFATE 3 ML: 2.5; .5 SOLUTION RESPIRATORY (INHALATION) at 11:28

## 2022-11-28 RX ADMIN — PHENOL 3 SPRAY: 1.5 LIQUID ORAL at 15:00

## 2022-11-28 RX ADMIN — ONDANSETRON 4 MG: 2 INJECTION INTRAMUSCULAR; INTRAVENOUS at 13:26

## 2022-11-28 RX ADMIN — DEXAMETHASONE SODIUM PHOSPHATE 6 MG: 10 INJECTION INTRAMUSCULAR; INTRAVENOUS at 09:44

## 2022-11-28 RX ADMIN — BUTALBITAL, ACETAMINOPHEN AND CAFFEINE 1 CAPSULE: 300; 40; 50 CAPSULE ORAL at 00:07

## 2022-11-28 RX ADMIN — BUDESONIDE 0.25 MG: 0.25 INHALANT ORAL at 18:26

## 2022-11-28 RX ADMIN — ARFORMOTEROL TARTRATE INHALATION 15 MCG: 15 SOLUTION RESPIRATORY (INHALATION) at 06:25

## 2022-11-28 RX ADMIN — MIDODRINE HYDROCHLORIDE 5 MG: 2.5 TABLET ORAL at 13:27

## 2022-11-28 RX ADMIN — ALPRAZOLAM 0.5 MG: 0.25 TABLET ORAL at 18:05

## 2022-11-28 RX ADMIN — ATENOLOL 50 MG: 50 TABLET ORAL at 09:44

## 2022-11-28 RX ADMIN — MONTELUKAST 10 MG: 10 TABLET, FILM COATED ORAL at 09:44

## 2022-11-28 RX ADMIN — ALPRAZOLAM 0.5 MG: 0.25 TABLET ORAL at 00:07

## 2022-11-28 RX ADMIN — IPRATROPIUM BROMIDE AND ALBUTEROL SULFATE 3 ML: 2.5; .5 SOLUTION RESPIRATORY (INHALATION) at 18:25

## 2022-11-28 RX ADMIN — ENOXAPARIN SODIUM 40 MG: 100 INJECTION SUBCUTANEOUS at 09:45

## 2022-11-28 RX ADMIN — INSULIN HUMAN 2 UNITS: 100 INJECTION, SOLUTION PARENTERAL at 06:46

## 2022-11-28 RX ADMIN — MIDODRINE HYDROCHLORIDE 5 MG: 2.5 TABLET ORAL at 18:05

## 2022-11-28 RX ADMIN — LISINOPRIL 10 MG: 10 TABLET ORAL at 09:44

## 2022-11-28 RX ADMIN — BUDESONIDE 0.25 MG: 0.25 INHALANT ORAL at 06:25

## 2022-11-28 RX ADMIN — Medication 10 ML: at 09:44

## 2022-11-28 RX ADMIN — DIVALPROEX SODIUM 125 MG: 125 CAPSULE, COATED PELLETS ORAL at 21:37

## 2022-11-28 RX ADMIN — ONDANSETRON 4 MG: 2 INJECTION INTRAMUSCULAR; INTRAVENOUS at 18:05

## 2022-11-28 RX ADMIN — ARFORMOTEROL TARTRATE INHALATION 15 MCG: 15 SOLUTION RESPIRATORY (INHALATION) at 18:26

## 2022-11-28 RX ADMIN — ALPRAZOLAM 0.5 MG: 0.25 TABLET ORAL at 06:47

## 2022-11-28 RX ADMIN — ASPIRIN 81 MG CHEWABLE TABLET 81 MG: 81 TABLET CHEWABLE at 09:44

## 2022-11-29 LAB
ANION GAP SERPL CALCULATED.3IONS-SCNC: 2.7 MMOL/L (ref 5–15)
BUN SERPL-MCNC: 53 MG/DL (ref 6–20)
BUN/CREAT SERPL: 71.6 (ref 7–25)
CALCIUM SPEC-SCNC: 10.8 MG/DL (ref 8.6–10.5)
CHLORIDE SERPL-SCNC: 86 MMOL/L (ref 98–107)
CO2 SERPL-SCNC: 49.3 MMOL/L (ref 22–29)
CREAT SERPL-MCNC: 0.74 MG/DL (ref 0.57–1)
DEPRECATED RDW RBC AUTO: 43.2 FL (ref 37–54)
EGFRCR SERPLBLD CKD-EPI 2021: 94.5 ML/MIN/1.73
ERYTHROCYTE [DISTWIDTH] IN BLOOD BY AUTOMATED COUNT: 12.1 % (ref 12.3–15.4)
GLUCOSE BLDC GLUCOMTR-MCNC: 130 MG/DL (ref 70–99)
GLUCOSE BLDC GLUCOMTR-MCNC: 145 MG/DL (ref 70–99)
GLUCOSE BLDC GLUCOMTR-MCNC: 152 MG/DL (ref 70–99)
GLUCOSE BLDC GLUCOMTR-MCNC: 160 MG/DL (ref 70–99)
GLUCOSE BLDC GLUCOMTR-MCNC: 243 MG/DL (ref 70–99)
GLUCOSE SERPL-MCNC: 132 MG/DL (ref 65–99)
HCT VFR BLD AUTO: 41.5 % (ref 34–46.6)
HGB BLD-MCNC: 13.4 G/DL (ref 12–15.9)
MAGNESIUM SERPL-MCNC: 2 MG/DL (ref 1.6–2.6)
MCH RBC QN AUTO: 31.3 PG (ref 26.6–33)
MCHC RBC AUTO-ENTMCNC: 32.3 G/DL (ref 31.5–35.7)
MCV RBC AUTO: 97 FL (ref 79–97)
PLATELET # BLD AUTO: 152 10*3/MM3 (ref 140–450)
PMV BLD AUTO: 12.4 FL (ref 6–12)
POTASSIUM SERPL-SCNC: 4.4 MMOL/L (ref 3.5–5.2)
RBC # BLD AUTO: 4.28 10*6/MM3 (ref 3.77–5.28)
SODIUM SERPL-SCNC: 138 MMOL/L (ref 136–145)
WBC NRBC COR # BLD: 11.18 10*3/MM3 (ref 3.4–10.8)

## 2022-11-29 PROCEDURE — 94799 UNLISTED PULMONARY SVC/PX: CPT

## 2022-11-29 PROCEDURE — 25010000002 DEXAMETHASONE PER 1 MG: Performed by: NURSE PRACTITIONER

## 2022-11-29 PROCEDURE — 99291 CRITICAL CARE FIRST HOUR: CPT | Performed by: INTERNAL MEDICINE

## 2022-11-29 PROCEDURE — 92526 ORAL FUNCTION THERAPY: CPT

## 2022-11-29 PROCEDURE — 63710000001 INSULIN REGULAR HUMAN PER 5 UNITS: Performed by: INTERNAL MEDICINE

## 2022-11-29 PROCEDURE — 83735 ASSAY OF MAGNESIUM: CPT | Performed by: NURSE PRACTITIONER

## 2022-11-29 PROCEDURE — 85027 COMPLETE CBC AUTOMATED: CPT | Performed by: NURSE PRACTITIONER

## 2022-11-29 PROCEDURE — 99233 SBSQ HOSP IP/OBS HIGH 50: CPT | Performed by: FAMILY MEDICINE

## 2022-11-29 PROCEDURE — 25010000002 ONDANSETRON PER 1 MG: Performed by: INTERNAL MEDICINE

## 2022-11-29 PROCEDURE — 25010000002 ENOXAPARIN PER 10 MG: Performed by: FAMILY MEDICINE

## 2022-11-29 PROCEDURE — 25010000002 PROCHLORPERAZINE 10 MG/2ML SOLUTION: Performed by: FAMILY MEDICINE

## 2022-11-29 PROCEDURE — 80048 BASIC METABOLIC PNL TOTAL CA: CPT | Performed by: NURSE PRACTITIONER

## 2022-11-29 PROCEDURE — 82962 GLUCOSE BLOOD TEST: CPT

## 2022-11-29 PROCEDURE — 97161 PT EVAL LOW COMPLEX 20 MIN: CPT

## 2022-11-29 PROCEDURE — 97165 OT EVAL LOW COMPLEX 30 MIN: CPT

## 2022-11-29 RX ORDER — MIDODRINE HYDROCHLORIDE 2.5 MG/1
5 TABLET ORAL
Status: DISCONTINUED | OUTPATIENT
Start: 2022-11-29 | End: 2022-11-29

## 2022-11-29 RX ORDER — PROCHLORPERAZINE EDISYLATE 5 MG/ML
2.5 INJECTION INTRAMUSCULAR; INTRAVENOUS EVERY 4 HOURS PRN
Status: DISCONTINUED | OUTPATIENT
Start: 2022-11-29 | End: 2022-12-13 | Stop reason: HOSPADM

## 2022-11-29 RX ADMIN — IPRATROPIUM BROMIDE AND ALBUTEROL SULFATE 3 ML: 2.5; .5 SOLUTION RESPIRATORY (INHALATION) at 06:14

## 2022-11-29 RX ADMIN — ONDANSETRON 4 MG: 2 INJECTION INTRAMUSCULAR; INTRAVENOUS at 07:58

## 2022-11-29 RX ADMIN — SPIRONOLACTONE 25 MG: 25 TABLET ORAL at 09:06

## 2022-11-29 RX ADMIN — IPRATROPIUM BROMIDE AND ALBUTEROL SULFATE 3 ML: 2.5; .5 SOLUTION RESPIRATORY (INHALATION) at 18:20

## 2022-11-29 RX ADMIN — Medication 10 ML: at 09:07

## 2022-11-29 RX ADMIN — LEVOTHYROXINE SODIUM 125 MCG: 125 TABLET ORAL at 05:32

## 2022-11-29 RX ADMIN — ENOXAPARIN SODIUM 40 MG: 100 INJECTION SUBCUTANEOUS at 09:06

## 2022-11-29 RX ADMIN — PROCHLORPERAZINE EDISYLATE 2.5 MG: 5 INJECTION INTRAMUSCULAR; INTRAVENOUS at 10:22

## 2022-11-29 RX ADMIN — INSULIN HUMAN 2 UNITS: 100 INJECTION, SOLUTION PARENTERAL at 17:19

## 2022-11-29 RX ADMIN — DEXAMETHASONE SODIUM PHOSPHATE 6 MG: 10 INJECTION INTRAMUSCULAR; INTRAVENOUS at 09:06

## 2022-11-29 RX ADMIN — INSULIN HUMAN 4 UNITS: 100 INJECTION, SOLUTION PARENTERAL at 12:31

## 2022-11-29 RX ADMIN — IPRATROPIUM BROMIDE AND ALBUTEROL SULFATE 3 ML: 2.5; .5 SOLUTION RESPIRATORY (INHALATION) at 11:37

## 2022-11-29 RX ADMIN — DIVALPROEX SODIUM 125 MG: 125 CAPSULE, COATED PELLETS ORAL at 20:52

## 2022-11-29 RX ADMIN — ARFORMOTEROL TARTRATE INHALATION 15 MCG: 15 SOLUTION RESPIRATORY (INHALATION) at 18:20

## 2022-11-29 RX ADMIN — INSULIN HUMAN 2 UNITS: 100 INJECTION, SOLUTION PARENTERAL at 05:31

## 2022-11-29 RX ADMIN — ARFORMOTEROL TARTRATE INHALATION 15 MCG: 15 SOLUTION RESPIRATORY (INHALATION) at 06:14

## 2022-11-29 RX ADMIN — BUTALBITAL, ACETAMINOPHEN AND CAFFEINE 1 CAPSULE: 300; 40; 50 CAPSULE ORAL at 15:28

## 2022-11-29 RX ADMIN — ATENOLOL 50 MG: 25 TABLET ORAL at 09:06

## 2022-11-29 RX ADMIN — ALPRAZOLAM 0.5 MG: 0.25 TABLET ORAL at 15:28

## 2022-11-29 RX ADMIN — ALPRAZOLAM 0.5 MG: 0.25 TABLET ORAL at 21:16

## 2022-11-29 RX ADMIN — BUDESONIDE 0.25 MG: 0.25 INHALANT ORAL at 18:20

## 2022-11-29 RX ADMIN — IPRATROPIUM BROMIDE AND ALBUTEROL SULFATE 3 ML: 2.5; .5 SOLUTION RESPIRATORY (INHALATION) at 00:35

## 2022-11-29 RX ADMIN — ASPIRIN 81 MG CHEWABLE TABLET 81 MG: 81 TABLET CHEWABLE at 09:05

## 2022-11-29 RX ADMIN — MONTELUKAST 10 MG: 10 TABLET, FILM COATED ORAL at 09:06

## 2022-11-29 RX ADMIN — BUDESONIDE 0.25 MG: 0.25 INHALANT ORAL at 06:14

## 2022-11-29 RX ADMIN — ALPRAZOLAM 0.5 MG: 0.25 TABLET ORAL at 03:15

## 2022-11-29 RX ADMIN — MIDODRINE HYDROCHLORIDE 5 MG: 2.5 TABLET ORAL at 09:05

## 2022-11-29 RX ADMIN — DIVALPROEX SODIUM 125 MG: 125 CAPSULE, COATED PELLETS ORAL at 09:05

## 2022-11-29 RX ADMIN — SERTRALINE HYDROCHLORIDE 200 MG: 100 TABLET ORAL at 09:06

## 2022-11-30 ENCOUNTER — APPOINTMENT (OUTPATIENT)
Dept: GENERAL RADIOLOGY | Facility: HOSPITAL | Age: 57
End: 2022-11-30

## 2022-11-30 LAB
ANION GAP SERPL CALCULATED.3IONS-SCNC: ABNORMAL MMOL/L
BUN SERPL-MCNC: 38 MG/DL (ref 6–20)
BUN/CREAT SERPL: 71.7 (ref 7–25)
CALCIUM SPEC-SCNC: 11 MG/DL (ref 8.6–10.5)
CHLORIDE SERPL-SCNC: 88 MMOL/L (ref 98–107)
CO2 SERPL-SCNC: >50 MMOL/L (ref 22–29)
CREAT SERPL-MCNC: 0.53 MG/DL (ref 0.57–1)
DEPRECATED RDW RBC AUTO: 43.7 FL (ref 37–54)
EGFRCR SERPLBLD CKD-EPI 2021: 108 ML/MIN/1.73
ERYTHROCYTE [DISTWIDTH] IN BLOOD BY AUTOMATED COUNT: 12 % (ref 12.3–15.4)
GLUCOSE BLDC GLUCOMTR-MCNC: 142 MG/DL (ref 70–99)
GLUCOSE BLDC GLUCOMTR-MCNC: 143 MG/DL (ref 70–99)
GLUCOSE BLDC GLUCOMTR-MCNC: 193 MG/DL (ref 70–99)
GLUCOSE BLDC GLUCOMTR-MCNC: 203 MG/DL (ref 70–99)
GLUCOSE SERPL-MCNC: 138 MG/DL (ref 65–99)
HCT VFR BLD AUTO: 39.6 % (ref 34–46.6)
HGB BLD-MCNC: 12.8 G/DL (ref 12–15.9)
MAGNESIUM SERPL-MCNC: 1.9 MG/DL (ref 1.6–2.6)
MCH RBC QN AUTO: 31.8 PG (ref 26.6–33)
MCHC RBC AUTO-ENTMCNC: 32.3 G/DL (ref 31.5–35.7)
MCV RBC AUTO: 98.3 FL (ref 79–97)
NT-PROBNP SERPL-MCNC: 451.6 PG/ML (ref 0–900)
PLATELET # BLD AUTO: 152 10*3/MM3 (ref 140–450)
PMV BLD AUTO: 12.9 FL (ref 6–12)
POTASSIUM SERPL-SCNC: 4.4 MMOL/L (ref 3.5–5.2)
RBC # BLD AUTO: 4.03 10*6/MM3 (ref 3.77–5.28)
SODIUM SERPL-SCNC: 139 MMOL/L (ref 136–145)
WBC NRBC COR # BLD: 9.86 10*3/MM3 (ref 3.4–10.8)

## 2022-11-30 PROCEDURE — 94761 N-INVAS EAR/PLS OXIMETRY MLT: CPT

## 2022-11-30 PROCEDURE — 80048 BASIC METABOLIC PNL TOTAL CA: CPT | Performed by: NURSE PRACTITIONER

## 2022-11-30 PROCEDURE — 83735 ASSAY OF MAGNESIUM: CPT | Performed by: NURSE PRACTITIONER

## 2022-11-30 PROCEDURE — 25010000002 ONDANSETRON PER 1 MG: Performed by: INTERNAL MEDICINE

## 2022-11-30 PROCEDURE — 25010000002 ACETAZOLAMIDE PER 500 MG: Performed by: INTERNAL MEDICINE

## 2022-11-30 PROCEDURE — 94799 UNLISTED PULMONARY SVC/PX: CPT

## 2022-11-30 PROCEDURE — 85027 COMPLETE CBC AUTOMATED: CPT | Performed by: NURSE PRACTITIONER

## 2022-11-30 PROCEDURE — 99233 SBSQ HOSP IP/OBS HIGH 50: CPT | Performed by: INTERNAL MEDICINE

## 2022-11-30 PROCEDURE — 92526 ORAL FUNCTION THERAPY: CPT

## 2022-11-30 PROCEDURE — 82962 GLUCOSE BLOOD TEST: CPT

## 2022-11-30 PROCEDURE — 71045 X-RAY EXAM CHEST 1 VIEW: CPT

## 2022-11-30 PROCEDURE — 83880 ASSAY OF NATRIURETIC PEPTIDE: CPT | Performed by: NURSE PRACTITIONER

## 2022-11-30 PROCEDURE — 94760 N-INVAS EAR/PLS OXIMETRY 1: CPT

## 2022-11-30 PROCEDURE — 97530 THERAPEUTIC ACTIVITIES: CPT

## 2022-11-30 PROCEDURE — 25010000002 DEXAMETHASONE PER 1 MG: Performed by: NURSE PRACTITIONER

## 2022-11-30 PROCEDURE — 25010000002 ENOXAPARIN PER 10 MG: Performed by: FAMILY MEDICINE

## 2022-11-30 PROCEDURE — 63710000001 INSULIN REGULAR HUMAN PER 5 UNITS: Performed by: INTERNAL MEDICINE

## 2022-11-30 RX ORDER — ASPIRIN 81 MG/1
81 TABLET, CHEWABLE ORAL DAILY
Status: DISCONTINUED | OUTPATIENT
Start: 2022-12-01 | End: 2022-12-13 | Stop reason: HOSPADM

## 2022-11-30 RX ORDER — ACETAZOLAMIDE 500 MG/5ML
500 INJECTION, POWDER, LYOPHILIZED, FOR SOLUTION INTRAVENOUS ONCE
Status: COMPLETED | OUTPATIENT
Start: 2022-11-30 | End: 2022-11-30

## 2022-11-30 RX ORDER — SERTRALINE HYDROCHLORIDE 100 MG/1
200 TABLET, FILM COATED ORAL DAILY
Status: DISCONTINUED | OUTPATIENT
Start: 2022-12-01 | End: 2022-12-13 | Stop reason: HOSPADM

## 2022-11-30 RX ORDER — SPIRONOLACTONE 25 MG/1
25 TABLET ORAL DAILY
Status: DISCONTINUED | OUTPATIENT
Start: 2022-12-01 | End: 2022-12-13 | Stop reason: HOSPADM

## 2022-11-30 RX ORDER — ACETAMINOPHEN 325 MG/1
650 TABLET ORAL EVERY 6 HOURS PRN
Status: DISCONTINUED | OUTPATIENT
Start: 2022-11-30 | End: 2022-12-13 | Stop reason: HOSPADM

## 2022-11-30 RX ORDER — ALPRAZOLAM 0.25 MG/1
0.5 TABLET ORAL 4 TIMES DAILY PRN
Status: DISPENSED | OUTPATIENT
Start: 2022-11-30 | End: 2022-12-13

## 2022-11-30 RX ORDER — MONTELUKAST SODIUM 10 MG/1
10 TABLET ORAL DAILY
Status: DISCONTINUED | OUTPATIENT
Start: 2022-12-01 | End: 2022-12-13 | Stop reason: HOSPADM

## 2022-11-30 RX ORDER — LEVOTHYROXINE SODIUM 0.12 MG/1
125 TABLET ORAL
Status: DISCONTINUED | OUTPATIENT
Start: 2022-12-01 | End: 2022-12-13 | Stop reason: HOSPADM

## 2022-11-30 RX ORDER — BUTALBITAL, ACETAMINOPHEN AND CAFFEINE 300; 40; 50 MG/1; MG/1; MG/1
1 CAPSULE ORAL EVERY 6 HOURS PRN
Status: DISCONTINUED | OUTPATIENT
Start: 2022-11-30 | End: 2022-12-13 | Stop reason: HOSPADM

## 2022-11-30 RX ORDER — ACETAMINOPHEN 500 MG
1000 TABLET ORAL EVERY 8 HOURS PRN
Status: DISCONTINUED | OUTPATIENT
Start: 2022-11-30 | End: 2022-12-13 | Stop reason: HOSPADM

## 2022-11-30 RX ORDER — BUSPIRONE HYDROCHLORIDE 5 MG/1
5 TABLET ORAL DAILY
Status: DISCONTINUED | OUTPATIENT
Start: 2022-12-01 | End: 2022-12-13 | Stop reason: HOSPADM

## 2022-11-30 RX ORDER — ATENOLOL 50 MG/1
50 TABLET ORAL
Status: DISCONTINUED | OUTPATIENT
Start: 2022-12-01 | End: 2022-12-13 | Stop reason: HOSPADM

## 2022-11-30 RX ORDER — DIVALPROEX SODIUM 125 MG/1
125 CAPSULE, COATED PELLETS ORAL EVERY 12 HOURS SCHEDULED
Status: DISCONTINUED | OUTPATIENT
Start: 2022-11-30 | End: 2022-12-13 | Stop reason: HOSPADM

## 2022-11-30 RX ADMIN — ACETAZOLAMIDE 500 MG: 500 INJECTION, POWDER, LYOPHILIZED, FOR SOLUTION INTRAVENOUS at 09:03

## 2022-11-30 RX ADMIN — ATENOLOL 50 MG: 25 TABLET ORAL at 09:01

## 2022-11-30 RX ADMIN — IPRATROPIUM BROMIDE AND ALBUTEROL SULFATE 3 ML: 2.5; .5 SOLUTION RESPIRATORY (INHALATION) at 13:11

## 2022-11-30 RX ADMIN — DIVALPROEX SODIUM 125 MG: 125 CAPSULE, COATED PELLETS ORAL at 20:19

## 2022-11-30 RX ADMIN — ONDANSETRON 4 MG: 2 INJECTION INTRAMUSCULAR; INTRAVENOUS at 11:11

## 2022-11-30 RX ADMIN — ALPRAZOLAM 0.5 MG: 0.25 TABLET ORAL at 07:49

## 2022-11-30 RX ADMIN — SERTRALINE HYDROCHLORIDE 200 MG: 100 TABLET ORAL at 09:02

## 2022-11-30 RX ADMIN — SODIUM CHLORIDE 250 ML: 9 INJECTION, SOLUTION INTRAVENOUS at 22:26

## 2022-11-30 RX ADMIN — ALPRAZOLAM 0.5 MG: 0.25 TABLET ORAL at 18:37

## 2022-11-30 RX ADMIN — SPIRONOLACTONE 25 MG: 25 TABLET ORAL at 09:02

## 2022-11-30 RX ADMIN — BUDESONIDE 0.25 MG: 0.25 INHALANT ORAL at 18:29

## 2022-11-30 RX ADMIN — ENOXAPARIN SODIUM 40 MG: 100 INJECTION SUBCUTANEOUS at 09:03

## 2022-11-30 RX ADMIN — MONTELUKAST 10 MG: 10 TABLET, FILM COATED ORAL at 09:01

## 2022-11-30 RX ADMIN — INSULIN HUMAN 4 UNITS: 100 INJECTION, SOLUTION PARENTERAL at 12:25

## 2022-11-30 RX ADMIN — ASPIRIN 81 MG CHEWABLE TABLET 81 MG: 81 TABLET CHEWABLE at 09:01

## 2022-11-30 RX ADMIN — IPRATROPIUM BROMIDE AND ALBUTEROL SULFATE 3 ML: 2.5; .5 SOLUTION RESPIRATORY (INHALATION) at 01:31

## 2022-11-30 RX ADMIN — DEXAMETHASONE SODIUM PHOSPHATE 6 MG: 10 INJECTION INTRAMUSCULAR; INTRAVENOUS at 09:01

## 2022-11-30 RX ADMIN — Medication 10 ML: at 20:21

## 2022-11-30 RX ADMIN — Medication 10 ML: at 09:03

## 2022-11-30 RX ADMIN — ARFORMOTEROL TARTRATE INHALATION 15 MCG: 15 SOLUTION RESPIRATORY (INHALATION) at 18:28

## 2022-11-30 RX ADMIN — ARFORMOTEROL TARTRATE INHALATION 15 MCG: 15 SOLUTION RESPIRATORY (INHALATION) at 06:39

## 2022-11-30 RX ADMIN — BUTALBITAL, ACETAMINOPHEN AND CAFFEINE 1 CAPSULE: 300; 40; 50 CAPSULE ORAL at 20:19

## 2022-11-30 RX ADMIN — IPRATROPIUM BROMIDE AND ALBUTEROL SULFATE 3 ML: 2.5; .5 SOLUTION RESPIRATORY (INHALATION) at 18:28

## 2022-11-30 RX ADMIN — BUTALBITAL, ACETAMINOPHEN AND CAFFEINE 1 CAPSULE: 300; 40; 50 CAPSULE ORAL at 07:49

## 2022-11-30 RX ADMIN — BUDESONIDE 0.25 MG: 0.25 INHALANT ORAL at 06:44

## 2022-11-30 RX ADMIN — DIVALPROEX SODIUM 125 MG: 125 CAPSULE, COATED PELLETS ORAL at 09:03

## 2022-11-30 RX ADMIN — LEVOTHYROXINE SODIUM 125 MCG: 125 TABLET ORAL at 06:16

## 2022-11-30 RX ADMIN — INSULIN HUMAN 2 UNITS: 100 INJECTION, SOLUTION PARENTERAL at 17:07

## 2022-11-30 RX ADMIN — IPRATROPIUM BROMIDE AND ALBUTEROL SULFATE 3 ML: 2.5; .5 SOLUTION RESPIRATORY (INHALATION) at 06:39

## 2022-12-01 LAB
ANION GAP SERPL CALCULATED.3IONS-SCNC: 4 MMOL/L (ref 5–15)
BUN SERPL-MCNC: 26 MG/DL (ref 6–20)
BUN/CREAT SERPL: 41.9 (ref 7–25)
CALCIUM SPEC-SCNC: 10.2 MG/DL (ref 8.6–10.5)
CHLORIDE SERPL-SCNC: 88 MMOL/L (ref 98–107)
CO2 SERPL-SCNC: 43 MMOL/L (ref 22–29)
CREAT SERPL-MCNC: 0.62 MG/DL (ref 0.57–1)
DEPRECATED RDW RBC AUTO: 44.2 FL (ref 37–54)
EGFRCR SERPLBLD CKD-EPI 2021: 104 ML/MIN/1.73
ERYTHROCYTE [DISTWIDTH] IN BLOOD BY AUTOMATED COUNT: 12.3 % (ref 12.3–15.4)
GLUCOSE BLDC GLUCOMTR-MCNC: 103 MG/DL (ref 70–99)
GLUCOSE BLDC GLUCOMTR-MCNC: 150 MG/DL (ref 70–99)
GLUCOSE BLDC GLUCOMTR-MCNC: 176 MG/DL (ref 70–99)
GLUCOSE SERPL-MCNC: 100 MG/DL (ref 65–99)
HCT VFR BLD AUTO: 38.8 % (ref 34–46.6)
HGB BLD-MCNC: 12.4 G/DL (ref 12–15.9)
MAGNESIUM SERPL-MCNC: 1.9 MG/DL (ref 1.6–2.6)
MCH RBC QN AUTO: 31.6 PG (ref 26.6–33)
MCHC RBC AUTO-ENTMCNC: 32 G/DL (ref 31.5–35.7)
MCV RBC AUTO: 99 FL (ref 79–97)
PHOSPHATE SERPL-MCNC: 2.6 MG/DL (ref 2.5–4.5)
PLATELET # BLD AUTO: 179 10*3/MM3 (ref 140–450)
PMV BLD AUTO: 12.9 FL (ref 6–12)
POTASSIUM SERPL-SCNC: 4.6 MMOL/L (ref 3.5–5.2)
RBC # BLD AUTO: 3.92 10*6/MM3 (ref 3.77–5.28)
SODIUM SERPL-SCNC: 135 MMOL/L (ref 136–145)
WBC NRBC COR # BLD: 9.6 10*3/MM3 (ref 3.4–10.8)

## 2022-12-01 PROCEDURE — 25010000002 ENOXAPARIN PER 10 MG: Performed by: FAMILY MEDICINE

## 2022-12-01 PROCEDURE — 99232 SBSQ HOSP IP/OBS MODERATE 35: CPT | Performed by: INTERNAL MEDICINE

## 2022-12-01 PROCEDURE — 94799 UNLISTED PULMONARY SVC/PX: CPT

## 2022-12-01 PROCEDURE — 84100 ASSAY OF PHOSPHORUS: CPT | Performed by: INTERNAL MEDICINE

## 2022-12-01 PROCEDURE — 94664 DEMO&/EVAL PT USE INHALER: CPT

## 2022-12-01 PROCEDURE — 85027 COMPLETE CBC AUTOMATED: CPT | Performed by: INTERNAL MEDICINE

## 2022-12-01 PROCEDURE — 83735 ASSAY OF MAGNESIUM: CPT | Performed by: INTERNAL MEDICINE

## 2022-12-01 PROCEDURE — 80048 BASIC METABOLIC PNL TOTAL CA: CPT | Performed by: INTERNAL MEDICINE

## 2022-12-01 PROCEDURE — 94761 N-INVAS EAR/PLS OXIMETRY MLT: CPT

## 2022-12-01 PROCEDURE — 63710000001 INSULIN LISPRO (HUMAN) PER 5 UNITS: Performed by: INTERNAL MEDICINE

## 2022-12-01 PROCEDURE — 99233 SBSQ HOSP IP/OBS HIGH 50: CPT | Performed by: INTERNAL MEDICINE

## 2022-12-01 PROCEDURE — 94760 N-INVAS EAR/PLS OXIMETRY 1: CPT

## 2022-12-01 PROCEDURE — 25010000002 ONDANSETRON PER 1 MG: Performed by: INTERNAL MEDICINE

## 2022-12-01 PROCEDURE — 92526 ORAL FUNCTION THERAPY: CPT

## 2022-12-01 PROCEDURE — 25010000002 DEXAMETHASONE PER 1 MG: Performed by: NURSE PRACTITIONER

## 2022-12-01 PROCEDURE — 82962 GLUCOSE BLOOD TEST: CPT

## 2022-12-01 RX ORDER — INSULIN LISPRO 100 [IU]/ML
2-7 INJECTION, SOLUTION INTRAVENOUS; SUBCUTANEOUS
Status: DISCONTINUED | OUTPATIENT
Start: 2022-12-01 | End: 2022-12-13 | Stop reason: HOSPADM

## 2022-12-01 RX ORDER — NICOTINE POLACRILEX 4 MG
15 LOZENGE BUCCAL
Status: DISCONTINUED | OUTPATIENT
Start: 2022-12-01 | End: 2022-12-13 | Stop reason: HOSPADM

## 2022-12-01 RX ORDER — DEXTROSE MONOHYDRATE 25 G/50ML
25 INJECTION, SOLUTION INTRAVENOUS
Status: DISCONTINUED | OUTPATIENT
Start: 2022-12-01 | End: 2022-12-13 | Stop reason: HOSPADM

## 2022-12-01 RX ADMIN — INSULIN LISPRO 2 UNITS: 100 INJECTION, SOLUTION INTRAVENOUS; SUBCUTANEOUS at 12:25

## 2022-12-01 RX ADMIN — ARFORMOTEROL TARTRATE INHALATION 15 MCG: 15 SOLUTION RESPIRATORY (INHALATION) at 21:20

## 2022-12-01 RX ADMIN — ONDANSETRON 4 MG: 2 INJECTION INTRAMUSCULAR; INTRAVENOUS at 10:43

## 2022-12-01 RX ADMIN — BUTALBITAL, ACETAMINOPHEN AND CAFFEINE 1 CAPSULE: 300; 40; 50 CAPSULE ORAL at 16:29

## 2022-12-01 RX ADMIN — ATENOLOL 50 MG: 50 TABLET ORAL at 10:44

## 2022-12-01 RX ADMIN — SPIRONOLACTONE 25 MG: 25 TABLET ORAL at 10:44

## 2022-12-01 RX ADMIN — BUDESONIDE 0.25 MG: 0.25 INHALANT ORAL at 06:28

## 2022-12-01 RX ADMIN — ALPRAZOLAM 0.5 MG: 0.25 TABLET ORAL at 10:43

## 2022-12-01 RX ADMIN — SERTRALINE HYDROCHLORIDE 200 MG: 100 TABLET ORAL at 10:44

## 2022-12-01 RX ADMIN — ENOXAPARIN SODIUM 40 MG: 100 INJECTION SUBCUTANEOUS at 10:43

## 2022-12-01 RX ADMIN — MONTELUKAST 10 MG: 10 TABLET, FILM COATED ORAL at 10:44

## 2022-12-01 RX ADMIN — BUDESONIDE 0.25 MG: 0.25 INHALANT ORAL at 21:20

## 2022-12-01 RX ADMIN — ARFORMOTEROL TARTRATE INHALATION 15 MCG: 15 SOLUTION RESPIRATORY (INHALATION) at 06:28

## 2022-12-01 RX ADMIN — ASPIRIN 81 MG CHEWABLE TABLET 81 MG: 81 TABLET CHEWABLE at 10:43

## 2022-12-01 RX ADMIN — IPRATROPIUM BROMIDE AND ALBUTEROL SULFATE 3 ML: 2.5; .5 SOLUTION RESPIRATORY (INHALATION) at 11:41

## 2022-12-01 RX ADMIN — ALPRAZOLAM 0.5 MG: 0.25 TABLET ORAL at 16:29

## 2022-12-01 RX ADMIN — IPRATROPIUM BROMIDE AND ALBUTEROL SULFATE 3 ML: 2.5; .5 SOLUTION RESPIRATORY (INHALATION) at 06:28

## 2022-12-01 RX ADMIN — IPRATROPIUM BROMIDE AND ALBUTEROL SULFATE 3 ML: 2.5; .5 SOLUTION RESPIRATORY (INHALATION) at 21:19

## 2022-12-01 RX ADMIN — INSULIN LISPRO 2 UNITS: 100 INJECTION, SOLUTION INTRAVENOUS; SUBCUTANEOUS at 17:21

## 2022-12-01 RX ADMIN — LEVOTHYROXINE SODIUM 125 MCG: 125 TABLET ORAL at 06:17

## 2022-12-01 RX ADMIN — DIVALPROEX SODIUM 125 MG: 125 CAPSULE, COATED PELLETS ORAL at 10:44

## 2022-12-01 RX ADMIN — ONDANSETRON 4 MG: 2 INJECTION INTRAMUSCULAR; INTRAVENOUS at 20:43

## 2022-12-01 RX ADMIN — ACETAMINOPHEN 1000 MG: 500 TABLET ORAL at 03:58

## 2022-12-01 RX ADMIN — DEXAMETHASONE SODIUM PHOSPHATE 6 MG: 10 INJECTION INTRAMUSCULAR; INTRAVENOUS at 10:44

## 2022-12-01 NOTE — PLAN OF CARE
Problem: Adult Inpatient Plan of Care  Goal: Plan of Care Review  Outcome: Ongoing, Progressing  Flowsheets (Taken 12/1/2022 0415)  Progress: improving  Plan of Care Reviewed With: patient  Outcome Evaluation: No acute distress noted, pt using Trilegy at HS. If not using Trilegy pt is on 3.5 L. Pt is turning self in bed well, and can apply and take off Trilegy mask on own. Pt c/o migraine and back pain, PRN pain meds given and pt states effective. BP 80's/30-40's, received new orders for one time 250 mL bolus. BP continued to be 80's/40's, pt placed in Trendelenberg position. New manual 's/40's. Pt denies discomfort or distress at this time. Continue with POC.  Goal: Patient-Specific Goal (Individualized)  Outcome: Ongoing, Progressing  Goal: Absence of Hospital-Acquired Illness or Injury  Outcome: Ongoing, Progressing  Intervention: Identify and Manage Fall Risk  Recent Flowsheet Documentation  Taken 12/1/2022 0400 by Thorn, Erinne, RN  Safety Promotion/Fall Prevention: safety round/check completed  Taken 12/1/2022 0200 by Thorn, Erinne, RN  Safety Promotion/Fall Prevention: safety round/check completed  Taken 12/1/2022 0000 by Thorn, Erinne, RN  Safety Promotion/Fall Prevention: safety round/check completed  Taken 11/30/2022 2200 by Thorn, Erinne, RN  Safety Promotion/Fall Prevention: safety round/check completed  Taken 11/30/2022 2001 by Thorn, Erinne, RN  Safety Promotion/Fall Prevention: safety round/check completed  Intervention: Prevent Skin Injury  Recent Flowsheet Documentation  Taken 11/30/2022 1955 by Thorn, Erinne, RN  Body Position:   position changed independently   side-lying   head facing, right  Skin Protection:   adhesive use limited   tubing/devices free from skin contact  Intervention: Prevent and Manage VTE (Venous Thromboembolism) Risk  Recent Flowsheet Documentation  Taken 11/30/2022 1955 by Thorn, Erinne, RN  Activity Management:   bedrest   dorsiflexion/plantar flexion performed    activity adjusted per tolerance   activity encouraged  VTE Prevention/Management: (see MAR) other (see comments)  Range of Motion: active ROM (range of motion) encouraged  Intervention: Prevent Infection  Recent Flowsheet Documentation  Taken 12/1/2022 0400 by Thorn, Erinne, RN  Infection Prevention:   single patient room provided   rest/sleep promoted   hand hygiene promoted  Taken 12/1/2022 0200 by Thorn, Erinne, RN  Infection Prevention:   single patient room provided   rest/sleep promoted   hand hygiene promoted  Taken 12/1/2022 0000 by Thorn, Erinne, RN  Infection Prevention:   single patient room provided   rest/sleep promoted   hand hygiene promoted  Taken 11/30/2022 2200 by Thorn, Erinne, RN  Infection Prevention:   single patient room provided   rest/sleep promoted   hand hygiene promoted  Taken 11/30/2022 2001 by Thorn, Erinne, RN  Infection Prevention:   single patient room provided   rest/sleep promoted   hand hygiene promoted  Goal: Optimal Comfort and Wellbeing  Outcome: Ongoing, Progressing  Intervention: Provide Person-Centered Care  Recent Flowsheet Documentation  Taken 11/30/2022 1955 by Thorn, Erinne, RN  Trust Relationship/Rapport:   care explained   reassurance provided   questions encouraged   questions answered  Goal: Readiness for Transition of Care  Outcome: Ongoing, Progressing     Problem: COPD (Chronic Obstructive Pulmonary Disease) Comorbidity  Goal: Maintenance of COPD Symptom Control  Outcome: Ongoing, Progressing  Intervention: Maintain COPD-Symptom Control  Recent Flowsheet Documentation  Taken 12/1/2022 0400 by Thorn, Erinne, RN  Medication Review/Management: medications reviewed  Taken 12/1/2022 0200 by Thorn, Erinne, RN  Medication Review/Management: medications reviewed  Taken 12/1/2022 0000 by Thorn, Erinne, RN  Medication Review/Management: medications reviewed  Taken 11/30/2022 2200 by Thorn, Erinne, RN  Medication Review/Management: medications reviewed  Taken 11/30/2022 2001  by Thorn, Erinne, RN  Medication Review/Management: medications reviewed  Taken 11/30/2022 2000 by Thorn, Erinne, RN  Medication Review/Management: medications reviewed  Taken 11/30/2022 1955 by Thorn, Erinne, RN  Supportive Measures:   active listening utilized   self-care encouraged     Problem: Diabetes Comorbidity  Goal: Blood Glucose Level Within Targeted Range  Outcome: Ongoing, Progressing  Intervention: Monitor and Manage Glycemia  Recent Flowsheet Documentation  Taken 11/30/2022 1955 by Thorn, Erinne, RN  Glycemic Management: blood glucose monitored     Problem: Heart Failure Comorbidity  Goal: Maintenance of Heart Failure Symptom Control  Outcome: Ongoing, Progressing  Intervention: Maintain Heart Failure-Management  Recent Flowsheet Documentation  Taken 12/1/2022 0400 by Thorn, Erinne, RN  Medication Review/Management: medications reviewed  Taken 12/1/2022 0200 by Thorn, Erinne, RN  Medication Review/Management: medications reviewed  Taken 12/1/2022 0000 by Thorn, Erinne, RN  Medication Review/Management: medications reviewed  Taken 11/30/2022 2200 by Thorn, Erinne, RN  Medication Review/Management: medications reviewed  Taken 11/30/2022 2001 by Thorn, Erinne, RN  Medication Review/Management: medications reviewed  Taken 11/30/2022 2000 by Thorn, Erinne, RN  Medication Review/Management: medications reviewed     Problem: Palliative Care  Goal: Enhanced Quality of Life  Outcome: Ongoing, Progressing  Intervention: Optimize Function  Recent Flowsheet Documentation  Taken 11/30/2022 1955 by Thorn, Erinne, RN  Fatigue Management: activity schedule adjusted  Sleep/Rest Enhancement: awakenings minimized  Intervention: Optimize Psychosocial Wellbeing  Recent Flowsheet Documentation  Taken 11/30/2022 1955 by Thorn, Erinne, RN  Supportive Measures:   active listening utilized   self-care encouraged  Family/Support System Care:   self-care encouraged   support provided     Problem: Skin Injury Risk Increased  Goal:  Skin Health and Integrity  Outcome: Ongoing, Progressing  Intervention: Optimize Skin Protection  Recent Flowsheet Documentation  Taken 11/30/2022 1955 by Thorn, Erinne, RN  Pressure Reduction Techniques:   frequent weight shift encouraged   sit time limited to 1 hour   heels elevated off bed  Head of Bed (HOB) Positioning: HOB elevated  Pressure Reduction Devices:   pressure-redistributing mattress utilized   positioning supports utilized  Skin Protection:   adhesive use limited   tubing/devices free from skin contact     Problem: Fall Injury Risk  Goal: Absence of Fall and Fall-Related Injury  Outcome: Ongoing, Progressing  Intervention: Identify and Manage Contributors  Recent Flowsheet Documentation  Taken 12/1/2022 0400 by Thorn, Erinne, RN  Medication Review/Management: medications reviewed  Taken 12/1/2022 0200 by Thorn, Erinne, RN  Medication Review/Management: medications reviewed  Taken 12/1/2022 0000 by Thorn, Erinne, RN  Medication Review/Management: medications reviewed  Taken 11/30/2022 2200 by Thorn, Erinne, RN  Medication Review/Management: medications reviewed  Taken 11/30/2022 2001 by Thorn, Erinne, RN  Medication Review/Management: medications reviewed  Taken 11/30/2022 2000 by Thorn, Erinne, RN  Medication Review/Management: medications reviewed  Taken 11/30/2022 1955 by Thorn, Erinne, RN  Self-Care Promotion: independence encouraged  Intervention: Promote Injury-Free Environment  Recent Flowsheet Documentation  Taken 12/1/2022 0400 by Thorn, Erinne, RN  Safety Promotion/Fall Prevention: safety round/check completed  Taken 12/1/2022 0200 by Thorn, Erinne, RN  Safety Promotion/Fall Prevention: safety round/check completed  Taken 12/1/2022 0000 by Thorn, Erinne, RN  Safety Promotion/Fall Prevention: safety round/check completed  Taken 11/30/2022 2200 by Thorn, Erinne, RN  Safety Promotion/Fall Prevention: safety round/check completed  Taken 11/30/2022 2001 by Thorn, Erinne, RN  Safety  Promotion/Fall Prevention: safety round/check completed   Goal Outcome Evaluation:  Plan of Care Reviewed With: patient        Progress: improving  Outcome Evaluation: No acute distress noted, pt using Trilegy at HS. If not using Trilegy pt is on 3.5 L. Pt is turning self in bed well, and can apply and take off Trilegy mask on own. Pt c/o migraine and back pain, PRN pain meds given and pt states effective. BP 80's/30-40's, received new orders for one time 250 mL bolus. BP continued to be 80's/40's, pt placed in Trendelenberg position. New manual 's/40's. Pt denies discomfort or distress at this time. Continue with POC.

## 2022-12-01 NOTE — PROGRESS NOTES
Pulmonary / Critical Care Progress Note      Patient Name: Nova Manuel  : 1965  MRN: [id number]  Primary Care Physician:  Dana Banks APRN  Date of admission: 2022    Subjective   Subjective   Follow up for COPD exacerbation with FEV1 13%.     No acute events overnight.  Wore home trilogy.     This morning,  Lying in bed on 3 L nasal cannula  Breathing continues to improve each day  Nonproductive cough  Sore throat improved after core track removed  Tolerating diet  No chest pain  No fever or chills  Weak and fatigued     Review of Systems  General: Fatigue, otherwise denied complaints  HEENT: Denied complaints  Respiratory: Dyspnea, otherwise denied complaints  Cardiovascular: SCHROEDER, otherwise denied complaints  GI: Denied complaints  : Denied complaints  MSK: Weakness, otherwise denied complaints    Objective   Objective     Vitals:   Temp:  [97.7 °F (36.5 °C)-98.2 °F (36.8 °C)] 97.7 °F (36.5 °C)  Heart Rate:  [66-87] 85  Resp:  [18-22] 22  BP: ()/(38-48) 112/48  Flow (L/min):  [3.5-4] 3.5    Physical Exam   Vital Signs Reviewed   General:  WDWN female, Awake and Alert, NAD on 3 L NC  HEENT:  PERRL, EOMI   Neck:  No JVD, no thyromegaly  Chest: Good aeration, decreasing rhonchi bilaterally, barrel chested, tympanic to percussion bilaterally, pursed lip breathing noted  CV: RRR, no M/G/R, pulses 2+  Abd:  Soft, NT, ND, +BS, obese  EXT:  no clubbing, no cyanosis, no edema  Neuro:  A&Ox3, CN grossly intact, no focal deficits  Skin: No rashes or lesions noted    Exam simultaneously performed with my NP, Noemi Smallwood, and I agree with the above mentioned findings.    Result Review    Result Review:  I have personally reviewed the results from the time of this admission to 2022 07:59 EST and agree with these findings:  [x]  Laboratory  [x]  Microbiology  [x]  Radiology  [x]  EKG/Telemetry   [x]  Cardiology/Vascular   []  Pathology  []  Old records  []  Other:  Most notable findings  include:   [id number]  Potassium 4.6, creatinine 0.62, magnesium 1.9, WBC 9.6  CO2 >50 --> 43    11/21 human metapneumovirus        Lab 12/01/22  0440 11/30/22  0437 11/29/22  0323 11/28/22  0518 11/27/22  0337 11/26/22  0317 11/25/22  0950   WBC 9.60 9.86 11.18* 12.94* 12.59* 11.42* 10.58   HEMOGLOBIN 12.4 12.8 13.4 15.1 14.5 13.6 13.8   HEMATOCRIT 38.8 39.6 41.5 47.7* 44.8 42.7 43.7   PLATELETS 179 152 152 182 185 177 200   SODIUM 135* 139 138 137 139 139 141   POTASSIUM 4.6 4.4 4.4 4.1 4.2 3.9 4.3   CHLORIDE 88* 88* 86* 84* 88* 89* 89*   CO2 43.0* >50.0* 49.3* 48.0* 49.9* 44.8* 42.8*   BUN 26* 38* 53* 41* 31* 40* 57*   CREATININE 0.62 0.53* 0.74 0.65 0.61 0.67 1.19*   GLUCOSE 100* 138* 132* 186* 143* 130* 134*   CALCIUM 10.2 11.0* 10.8* 11.0* 10.5 10.1 10.1   PHOSPHORUS 2.6  --   --   --   --   --  2.8       Assessment & Plan   Assessment / Plan     Active Hospital Problems:  Active Hospital Problems    Diagnosis    • **Acute on chronic respiratory failure with hypoxia and hypercapnia (HCC)    • COPD with acute exacerbation (HCC)    • Restrictive lung disease      Impression:   Acute exacerbation of COPD, FEV1 13%  Human metapneumovirus PNA  Non-compliant with medication recommendations  Acute on chronic hypoxic hypercapnic respiratory failure: home trilogy and 4 L   Morbid Obesity: BMI 40.89  Type II Diabetes with hyperglycemia  Anxiety  Contraction alkolosis     Plan:   -On 3 L NC. Continue wean O2 to keep sats greater than 90%.   Home O2 requirement 4 L nasal cannula.  -Continue home trilogy to use at night and as needed days..  -CO2 >50  --> 32, improved after Diamox 500 mg x1.  -Has completed 12 days of steroids, will discontinue Decadron.  -Continue nebulizers and bronchopulmonary team.  Encourage I-S and flutter valve.  -Continue Singulair.  -Speech therapy on board and appreciate assistance. Has been approved for regular diet.   -Increase activity.  Out of bed to chair.  -PT/OT on board.  Awaiting  rehab.     -Has appt in COPD clinic with Hallie on 12/8 at 0900.     DVT prophylaxis:  Medical DVT prophylaxis orders are present.    CODE STATUS:   Code Status (Patient has no pulse and is not breathing): CPR (Attempt to Resuscitate)  Medical Interventions (Patient has pulse or is breathing): Full Support  Release to patient: Routine Release    Labs, microbiology, radiology, medications, and provider notes personally reviewed.  Discussed with primary services and bedside RN. 30 minutes critical care time spent managing this patient, excluding procedures.    Electronically signed by TRUDI Chavez, 12/01/22, 11:04 AM EST.    I, Dr. Donte Mcqueen, have spent more than 50% of the total time managing the patient in this encounter today.  This included personally reviewing all pertinent labs, imaging, microbiology and documentation. Also discussing the case with the patient and any available family, the admitting physician and any available ancillary staff.    Electronically signed by Donte Mcqueen MD, 12/01/22, 2:16 PM EST.

## 2022-12-01 NOTE — PROGRESS NOTES
[facility]   Hospitalist Progress Note  Date: 2022  Patient Name: Nova Manuel  : 1965  MRN: [id number]  Date of admission: 2022  Consultants:   -Pulmonology/Critical Care: Dr. Donte Mcqueen, Dr. Cheryle Silverio, Dr. Kamran Marquez, Dr. Levi Infante    Subjective   Subjective     Chief Complaint: Shortness of breath    Summary:   Nova Manuel is a 57 y.o. female with severe COPD (FEV1: 13%), chronic hypoxemic and hypercapnic respiratory failure on 2 L O2 via nasal cannula and nocturnal NIPPV who presented to the ED with complaints of worsening shortness of breath.  Upon evaluation in ED patient was in respiratory distress and started on NIPPV with improvement in symptoms.  CXR did not demonstrate acute infiltrate or effusion.  CT chest negative for PE.  Hospitalist service contacted for further evaluation management.  Pulmonology consulted.  Systemic steroids, and inhaled bronchodilators and empiric antibiotics initiated.  Respiratory panel returned positive for human metapneumovirus.  Patient continued on BiPAP and respiratory function with some improvement.  Given patient's COPD severity palliative care was consulted and decision made to continue with aggressive care measures.  Patient unable to tolerate much p.o. intake some core track was placed and nutrition initiated.  Patient's respiratory status has been improving and patient able to be weaned from BiPAP to Airvo and subsequently to nasal cannula BiPAP at night.    Interval Followup:   Patient with some hypotension overnight, did receive 250 mL bolus with minimal improvement, patient was then placed in Trendelenburg and blood pressure improved.  Patient's blood pressure improved this morning.  Patient resting comfortably this morning.  She denies any chest pain, abdominal pain, nausea or vomiting.  She states that her shortness of air continues to improve.  Nursing with no additional acute issues to report.    Review of Systems   All  systems reviewed and negative unless stated otherwise under subjective.    Objective   Objective     Vitals:   Temp:  [97.7 °F (36.5 °C)-98.2 °F (36.8 °C)] 98.1 °F (36.7 °C)  Heart Rate:  [66-89] 89  Resp:  [18-22] 22  BP: ()/(38-48) 120/40  Flow (L/min):  [3.5-4] 3.5  Physical Exam   Gen: No acute distress, lying in bed resting comfortably, easily arousable, conversant, pleasant  HEENT: MMM, Atraumatic  Neck: Supple, Trachea midline  Resp: CTAB, decreased bilateral rhonchi noted, no increase in work of breathing, equal chest rise bilaterally  Card: RRR, No m/r/g  Abd: Soft, Nontender, Nondistended, + bowel sounds  Ext: No cyanosis, No clubbing  Neuro: CN II-XII grossly intact, No focal deficits appreciated  Psych: AAO x 3, Normal mood, Normal affect    Result Review    Result Review:  I have personally reviewed the results as below and agree with these findings:  []  Laboratory:   CMP    CMP 11/29/22 11/30/22 12/1/22   Glucose 132 (A) 138 (A) 100 (A)   BUN 53 (A) 38 (A) 26 (A)   Creatinine 0.74 0.53 (A) 0.62   Sodium 138 139 135 (A)   Potassium 4.4 4.4 4.6   Chloride 86 (A) 88 (A) 88 (A)   Calcium 10.8 (A) 11.0 (A) 10.2   (A) Abnormal value       Comments are available for some flowsheets but are not being displayed.           CBC    CBC 11/29/22 11/30/22 12/1/22   WBC 11.18 (A) 9.86 9.60   RBC 4.28 4.03 3.92   Hemoglobin 13.4 12.8 12.4   Hematocrit 41.5 39.6 38.8   MCV 97.0 98.3 (A) 99.0 (A)   MCH 31.3 31.8 31.6   MCHC 32.3 32.3 32.0   RDW 12.1 (A) 12.0 (A) 12.3   Platelets 152 152 179   (A) Abnormal value            []  Microbiology:   []  Radiology:   [x]  EKG/Telemetry: No acute events.  Sinus rhythm.  Tachycardia noted.  []  Cardiology/Vascular:    []  Pathology:  []  Old records:  []  Other:    Assessment & Plan   Assessment / Plan     Assessment:  Acute on chronic hypercarbic and hypoxemic respiratory failure requiring NIPPV  Acute COPD exacerbation  Human metapneumovirus pneumonia  Medical  noncompliance  Obesity with BMI: 37.42  Type 2 diabetes mellitus with hyperglycemia  Anxiety  Contraction alkalosis  Hypertension    Plan:  -Pulmonology/Critical Care consulted and following, appreciate assistance and recommendations in the care of this patient.  -Continue supplemental O2 to maintain sats greater than 90%, wean as tolerated  -Continue home trilogy at night and with naps and as needed throughout the day  -Discontinue Decadron.  -Continue Brovana, Pulmicort and duo nebs  -Continue Singulair  -Continue SSI.  Monitor blood glucose level and SSI requirement titrate insulin regimen accordingly  -PT/OT consulted.  Patient encouraged to increase activity.  Out of bed to chair as tolerated.  I believe that with the continued work with skilled therapy services patient's condition will improve and eventually patient would be able to return home.  -Continue appropriate home medications  -Discontinue telemetry  -Monitor electrolytes and renal function with BMP and magnesium level in the AM  -Monitor WBC and Hgb with CBC in the AM  -Clinical course will dictate further management     DVT Prophylaxis: Lovenox  Diet: Regular  Dispo: PT/OT consulted  Code Status: Full code     Personally reviewed patients labs and imaging, discussed with patient and nurse at bedside. Discussed case with the following consultants: Pulmonology/Critical Care.     Part of this note may be an electronic transcription/translation of spoken language to printed text using the Dragon dictation system.    DVT prophylaxis:  Medical DVT prophylaxis orders are present.    CODE STATUS:   Code Status (Patient has no pulse and is not breathing): CPR (Attempt to Resuscitate)  Medical Interventions (Patient has pulse or is breathing): Full Support  Release to patient: Routine Release        Electronically signed by Julio Elder MD, 12/01/22, 8:23 AM EST.

## 2022-12-01 NOTE — THERAPY TREATMENT NOTE
Acute Care - Speech Language Pathology   Swallow Treatment Note [facility]     Patient Name: Nova Manuel  : 1965  MRN: [id number]  Today's Date: 2022               Admit Date: 2022    Visit Dx:     ICD-10-CM ICD-9-CM   1. Acute on chronic respiratory failure with hypoxia and hypercapnia (HCC)  J96.21 518.84    J96.22 786.09     799.02   2. COPD with acute exacerbation (HCC)  J44.1 491.21   3. Oropharyngeal dysphagia  R13.12 787.22   4. Decreased activities of daily living (ADL)  Z78.9 V49.89   5. Difficulty walking  R26.2 719.7     Patient Active Problem List   Diagnosis   • Centrilobular emphysema (HCC)   • Chronic respiratory failure with hypoxia and hypercapnia (HCC)   • Acute on chronic respiratory failure with hypoxia and hypercapnia (HCC)   • COPD with acute exacerbation (HCC)   • Restrictive lung disease   • Allergic rhinitis   • Anemia, pernicious   • Anxiety   • Asthma   • Diabetes (HCC)   • Migraine   • Mitral valve prolapse   • Morbid obesity with body mass index of 40.0-44.9 in adult (HCC)     Past Medical History:   Diagnosis Date   • Anxiety    • Asthma    • CHF (congestive heart failure) (HCC)    • COPD (chronic obstructive pulmonary disease) (HCC)    • Diabetes mellitus (HCC)    • Disease of thyroid gland    • Hyperlipidemia    • Hypertension      Past Surgical History:   Procedure Laterality Date   •  SECTION     • CHOLECYSTECTOMY     • HYSTERECTOMY     SPEECH PATHOLOGY DYSPHAGIA TREATMENT    Subjective/Behavioral Observations: Alert, cooperative.  On 4 L nasal cannula.        Day/time of Treatment: 2022        Current Diet: Regular soft, thin liquids          Treatment received: Treatment focused on tolerance of current diet and utilization of strategies to decrease risk of aspiration        Results of treatment: Patient feeding self with assist for meal set up.  Tolerating thin liquids via straw.  Consumed approximately 180 mL without any overt signs or symptoms of  aspiration.  Tolerating regular soft solids without any overt signs or symptoms of aspiration.        Progress toward goals: Goals met        Barriers to Achieving goals: N/A        Plan of care:/changes in plan: Patient has met her goals.  She is tolerating least restrictive diet of regular soft solids and thin liquids without any overt signs or symptoms of aspiration.  Respiratory status appears at baseline, on 4 L nasal cannula, no work of breathing or increased respiratory rate during p.o. intake.  Patient has met her goals for dysphagia.  Patient will be discharged from further speech pathology services.  Please refer if patient does demonstrate a change in status.                  SLP Recommendation and Plan                                                                            Plan of Care Reviewed With: patient          EDUCATION  The patient has been educated in the following areas:   Dysphagia (Swallowing Impairment).              Time Calculation:    Time Calculation- SLP     Row Name 12/01/22 1256             Time Calculation- SLP    SLP Stop Time 1200  -SN      SLP Received On 12/01/22  -SN         Untimed Charges    35852-TJ Treatment Swallow Minutes 45  -SN         Total Minutes    Untimed Charges Total Minutes 45  -SN       Total Minutes 45  -SN            User Key  (r) = Recorded By, (t) = Taken By, (c) = Cosigned By    Initials Name Provider Type    SN Lory Irizarry SLP Speech and Language Pathologist                Therapy Charges for Today     Code Description Service Date Service Provider Modifiers Qty    21465633198 HC ST TREATMENT SWALLOW 3 11/30/2022 Lory Irizarry SLP GN 1    97604116233 HC ST TREATMENT SWALLOW 3 12/1/2022 Lory Irizarry SLP GN 1               JEFFREY Do  12/1/2022

## 2022-12-02 LAB
ANION GAP SERPL CALCULATED.3IONS-SCNC: 1.7 MMOL/L (ref 5–15)
BUN SERPL-MCNC: 24 MG/DL (ref 6–20)
BUN/CREAT SERPL: 33.8 (ref 7–25)
CALCIUM SPEC-SCNC: 11 MG/DL (ref 8.6–10.5)
CHLORIDE SERPL-SCNC: 91 MMOL/L (ref 98–107)
CO2 SERPL-SCNC: 45.3 MMOL/L (ref 22–29)
CREAT SERPL-MCNC: 0.71 MG/DL (ref 0.57–1)
DEPRECATED RDW RBC AUTO: 43.2 FL (ref 37–54)
EGFRCR SERPLBLD CKD-EPI 2021: 99.3 ML/MIN/1.73
ERYTHROCYTE [DISTWIDTH] IN BLOOD BY AUTOMATED COUNT: 12.3 % (ref 12.3–15.4)
GLUCOSE BLDC GLUCOMTR-MCNC: 109 MG/DL (ref 70–99)
GLUCOSE BLDC GLUCOMTR-MCNC: 120 MG/DL (ref 70–99)
GLUCOSE BLDC GLUCOMTR-MCNC: 156 MG/DL (ref 70–99)
GLUCOSE SERPL-MCNC: 126 MG/DL (ref 65–99)
HCT VFR BLD AUTO: 38.9 % (ref 34–46.6)
HGB BLD-MCNC: 12.7 G/DL (ref 12–15.9)
MAGNESIUM SERPL-MCNC: 2 MG/DL (ref 1.6–2.6)
MCH RBC QN AUTO: 31.4 PG (ref 26.6–33)
MCHC RBC AUTO-ENTMCNC: 32.6 G/DL (ref 31.5–35.7)
MCV RBC AUTO: 96.3 FL (ref 79–97)
PHOSPHATE SERPL-MCNC: 3 MG/DL (ref 2.5–4.5)
PLATELET # BLD AUTO: 167 10*3/MM3 (ref 140–450)
PMV BLD AUTO: 11.7 FL (ref 6–12)
POTASSIUM SERPL-SCNC: 4.5 MMOL/L (ref 3.5–5.2)
RBC # BLD AUTO: 4.04 10*6/MM3 (ref 3.77–5.28)
SODIUM SERPL-SCNC: 138 MMOL/L (ref 136–145)
WBC NRBC COR # BLD: 8.36 10*3/MM3 (ref 3.4–10.8)

## 2022-12-02 PROCEDURE — 99232 SBSQ HOSP IP/OBS MODERATE 35: CPT | Performed by: INTERNAL MEDICINE

## 2022-12-02 PROCEDURE — 83735 ASSAY OF MAGNESIUM: CPT | Performed by: INTERNAL MEDICINE

## 2022-12-02 PROCEDURE — 94799 UNLISTED PULMONARY SVC/PX: CPT

## 2022-12-02 PROCEDURE — 94660 CPAP INITIATION&MGMT: CPT

## 2022-12-02 PROCEDURE — 63710000001 INSULIN LISPRO (HUMAN) PER 5 UNITS: Performed by: INTERNAL MEDICINE

## 2022-12-02 PROCEDURE — 25010000002 ENOXAPARIN PER 10 MG: Performed by: FAMILY MEDICINE

## 2022-12-02 PROCEDURE — 82962 GLUCOSE BLOOD TEST: CPT

## 2022-12-02 PROCEDURE — 25010000002 ONDANSETRON PER 1 MG: Performed by: INTERNAL MEDICINE

## 2022-12-02 PROCEDURE — 80048 BASIC METABOLIC PNL TOTAL CA: CPT | Performed by: INTERNAL MEDICINE

## 2022-12-02 PROCEDURE — 84100 ASSAY OF PHOSPHORUS: CPT | Performed by: INTERNAL MEDICINE

## 2022-12-02 PROCEDURE — 85027 COMPLETE CBC AUTOMATED: CPT | Performed by: INTERNAL MEDICINE

## 2022-12-02 PROCEDURE — 94762 N-INVAS EAR/PLS OXIMTRY CONT: CPT

## 2022-12-02 RX ADMIN — ATENOLOL 50 MG: 50 TABLET ORAL at 08:35

## 2022-12-02 RX ADMIN — ALPRAZOLAM 0.5 MG: 0.25 TABLET ORAL at 00:21

## 2022-12-02 RX ADMIN — ALPRAZOLAM 0.5 MG: 0.25 TABLET ORAL at 11:44

## 2022-12-02 RX ADMIN — SPIRONOLACTONE 25 MG: 25 TABLET ORAL at 08:35

## 2022-12-02 RX ADMIN — IPRATROPIUM BROMIDE AND ALBUTEROL SULFATE 3 ML: 2.5; .5 SOLUTION RESPIRATORY (INHALATION) at 18:40

## 2022-12-02 RX ADMIN — INSULIN LISPRO 2 UNITS: 100 INJECTION, SOLUTION INTRAVENOUS; SUBCUTANEOUS at 11:46

## 2022-12-02 RX ADMIN — DIVALPROEX SODIUM 125 MG: 125 CAPSULE, COATED PELLETS ORAL at 00:44

## 2022-12-02 RX ADMIN — ARFORMOTEROL TARTRATE INHALATION 15 MCG: 15 SOLUTION RESPIRATORY (INHALATION) at 06:57

## 2022-12-02 RX ADMIN — LEVOTHYROXINE SODIUM 125 MCG: 125 TABLET ORAL at 05:47

## 2022-12-02 RX ADMIN — IPRATROPIUM BROMIDE AND ALBUTEROL SULFATE 3 ML: 2.5; .5 SOLUTION RESPIRATORY (INHALATION) at 06:57

## 2022-12-02 RX ADMIN — Medication 10 ML: at 08:36

## 2022-12-02 RX ADMIN — IPRATROPIUM BROMIDE AND ALBUTEROL SULFATE 3 ML: 2.5; .5 SOLUTION RESPIRATORY (INHALATION) at 11:49

## 2022-12-02 RX ADMIN — ALPRAZOLAM 0.5 MG: 0.25 TABLET ORAL at 17:45

## 2022-12-02 RX ADMIN — BUTALBITAL, ACETAMINOPHEN AND CAFFEINE 1 CAPSULE: 300; 40; 50 CAPSULE ORAL at 14:53

## 2022-12-02 RX ADMIN — MONTELUKAST 10 MG: 10 TABLET, FILM COATED ORAL at 08:35

## 2022-12-02 RX ADMIN — IPRATROPIUM BROMIDE AND ALBUTEROL SULFATE 3 ML: 2.5; .5 SOLUTION RESPIRATORY (INHALATION) at 00:34

## 2022-12-02 RX ADMIN — BUTALBITAL, ACETAMINOPHEN AND CAFFEINE 1 CAPSULE: 300; 40; 50 CAPSULE ORAL at 00:21

## 2022-12-02 RX ADMIN — ASPIRIN 81 MG CHEWABLE TABLET 81 MG: 81 TABLET CHEWABLE at 08:35

## 2022-12-02 RX ADMIN — ARFORMOTEROL TARTRATE INHALATION 15 MCG: 15 SOLUTION RESPIRATORY (INHALATION) at 18:40

## 2022-12-02 RX ADMIN — ENOXAPARIN SODIUM 40 MG: 100 INJECTION SUBCUTANEOUS at 08:35

## 2022-12-02 RX ADMIN — DIVALPROEX SODIUM 125 MG: 125 CAPSULE, COATED PELLETS ORAL at 08:35

## 2022-12-02 RX ADMIN — SERTRALINE HYDROCHLORIDE 200 MG: 100 TABLET ORAL at 08:35

## 2022-12-02 RX ADMIN — BUDESONIDE 0.25 MG: 0.25 INHALANT ORAL at 06:58

## 2022-12-02 RX ADMIN — ONDANSETRON 4 MG: 2 INJECTION INTRAMUSCULAR; INTRAVENOUS at 08:41

## 2022-12-02 RX ADMIN — ONDANSETRON 4 MG: 2 INJECTION INTRAMUSCULAR; INTRAVENOUS at 21:47

## 2022-12-02 RX ADMIN — DIVALPROEX SODIUM 125 MG: 125 CAPSULE, COATED PELLETS ORAL at 21:30

## 2022-12-02 RX ADMIN — BUDESONIDE 0.25 MG: 0.25 INHALANT ORAL at 18:40

## 2022-12-02 NOTE — PLAN OF CARE
Goal Outcome Evaluation:              Outcome Evaluation: AOx4. VSS. Remains on 3L NC. Pulled church approx 1200, patient voided 350mL x1. Medicated for anxiety and pain per mar. Up to chair and bsc with x1 assist. Blood glucose monitored. Emotional support provided.

## 2022-12-02 NOTE — PROGRESS NOTES
Pulmonary / Critical Care Progress Note      Patient Name: Nova Manuel  : 1965  MRN: [id number]  Primary Care Physician:  Dana Banks APRN  Date of admission: 2022    Subjective   Subjective   Follow up for COPD exacerbation with FEV1 13%.     No acute events overnight.  Wore home trilogy.     This morning,  On 3 L of oxygen  Slowly improving  Has decreasing dry cough with no sputum production or hemoptysis  No wheezing  Dyspnea improved  Is weak and fatigued  No chest pain or hemoptysis  No fever or chills     Review of Systems  General: Fatigue, otherwise denied complaints  Respiratory: Dyspnea, cough, otherwise denied complaints  Cardiovascular: denied complaints  GI: Denied complaints  MSK: Weakness, otherwise denied complaints      Objective   Objective     Vitals:   Temp:  [97.8 °F (36.6 °C)-98.6 °F (37 °C)] 98.4 °F (36.9 °C)  Heart Rate:  [69-89] 82  Resp:  [18-24] 18  BP: (108-135)/(40-62) 110/40  Flow (L/min):  [3-3.5] 3.5    Physical Exam   Vital Signs Reviewed   General:  WDWN female, Awake and Alert, NAD  HEENT:  PERRL, EOMI   Neck:  No JVD, no thyromegaly  Chest: Good aeration, scant rhonchi bilaterally, barrel chested, tympanic to percussion bilaterally, pursed lip breathing noted  CV: RRR, no M/G/R, pulses 2+  Abd:  Soft, NT, ND, +BS, obese  EXT:  no clubbing, no cyanosis, no edema  Neuro:  A&Ox3, CN grossly intact, no focal deficits  Skin: No rashes or lesions noted      Result Review    Result Review:  I have personally reviewed the results from the time of this admission to 2022 09:28 EST and agree with these findings:  [x]  Laboratory  [x]  Microbiology  [x]  Radiology  [x]  EKG/Telemetry   [x]  Cardiology/Vascular   []  Pathology  []  Old records  []  Other:  Most notable findings include:   [id number]   human metapneumovirus        Lab 22  0518 22  0440 22  0437 22  0323 22  0518 22  0337 22  0317 22  0950   WBC  8.36 9.60 9.86 11.18* 12.94* 12.59* 11.42* 10.58   HEMOGLOBIN 12.7 12.4 12.8 13.4 15.1 14.5 13.6 13.8   HEMATOCRIT 38.9 38.8 39.6 41.5 47.7* 44.8 42.7 43.7   PLATELETS 167 179 152 152 182 185 177 200   SODIUM 138 135* 139 138 137 139 139 141   POTASSIUM 4.5 4.6 4.4 4.4 4.1 4.2 3.9 4.3   CHLORIDE 91* 88* 88* 86* 84* 88* 89* 89*   CO2 45.3* 43.0* >50.0* 49.3* 48.0* 49.9* 44.8* 42.8*   BUN 24* 26* 38* 53* 41* 31* 40* 57*   CREATININE 0.71 0.62 0.53* 0.74 0.65 0.61 0.67 1.19*   GLUCOSE 126* 100* 138* 132* 186* 143* 130* 134*   CALCIUM 11.0* 10.2 11.0* 10.8* 11.0* 10.5 10.1 10.1   PHOSPHORUS 3.0 2.6  --   --   --   --   --  2.8         Assessment & Plan   Assessment / Plan     Active Hospital Problems:  Active Hospital Problems    Diagnosis    • **Acute on chronic respiratory failure with hypoxia and hypercapnia (HCC)    • COPD with acute exacerbation (HCC)    • Restrictive lung disease      Impression:   Acute exacerbation of COPD, FEV1 13%  Human metapneumovirus PNA  Non-compliant with medication recommendations  Acute on chronic hypoxic hypercapnic respiratory failure: home trilogy and 4 L   Morbid Obesity: BMI 40.89  Type II Diabetes with hyperglycemia  Anxiety  Contraction alkolosis     Plan:   Home oxygen is 4 L.  She is doing better than that currently.  Goal SPO2 88 to 92%  Continue home trilogy to use at night and as needed days..  Completed steroids  Continue nebulizers and bronchopulmonary team.  Encourage I-S and flutter valve.  Continue Singulair.  Diet per speech therapy.  Appreciate assistance  Increase activity.  Out of bed to chair.  PT/OT on board.  Awaiting rehab.     Has appt in COPD clinic with Hallie on 12/8 at 0900.     DVT prophylaxis:  Medical DVT prophylaxis orders are present.    CODE STATUS:   Code Status (Patient has no pulse and is not breathing): CPR (Attempt to Resuscitate)  Medical Interventions (Patient has pulse or is breathing): Full Support  Release to patient: Routine  Release    Stable to discharge to rehab from my perspective      Labs, microbiology, radiology, medications, and provider notes personally reviewed.  Discussed with primary services and bedside RN.     Electronically signed by Donte Mcqueen MD, 12/02/22, 12:50 PM EST.

## 2022-12-02 NOTE — PLAN OF CARE
Goal Outcome Evaluation:  Plan of Care Reviewed With: patient        Progress: improving  Outcome Evaluation: AOx4. Church catheter in place. Skin and church care provided. Denies pain or discomfort this shift. Resting between care. Sleep with CPAP. VS WDL. No needs observed at this time.

## 2022-12-02 NOTE — PROGRESS NOTES
[facility]   Hospitalist Progress Note  Date: 2022  Patient Name: Nova Manuel  : 1965  MRN: [id number]  Date of admission: 2022  Consultants:   -Pulmonology/Critical Care: Dr. Donte Mcqueen, Dr. Cheryle Silverio, Dr. Kamran Marquez, Dr. Levi Infante    Subjective   Subjective     Chief Complaint: Shortness of breath    Summary:   Nova Manuel is a 57 y.o. female with severe COPD (FEV1: 13%), chronic hypoxemic and hypercapnic respiratory failure on 2 L O2 via nasal cannula and nocturnal NIPPV who presented to the ED with complaints of worsening shortness of breath.  Upon evaluation in ED patient was in respiratory distress and started on NIPPV with improvement in symptoms.  CXR did not demonstrate acute infiltrate or effusion.  CT chest negative for PE.  Hospitalist service contacted for further evaluation management.  Pulmonology consulted.  Systemic steroids, and inhaled bronchodilators and empiric antibiotics initiated.  Respiratory panel returned positive for human metapneumovirus.  Patient continued on BiPAP and respiratory function with some improvement.  Given patient's COPD severity palliative care was consulted and decision made to continue with aggressive care measures.  Patient unable to tolerate much p.o. intake some core track was placed and nutrition initiated.  Patient's respiratory status has been improving and patient able to be weaned from BiPAP to Airvo and subsequently to nasal cannula BiPAP at night.    Interval Followup:   No acute events overnight.  Patient continues to have cough but notes that it is improving.  She also states that her shortness of breath is improving slightly.  She continues to be weak and is easily fatigued.  Denies any chest pain, abdominal pain, nausea or vomiting.  Nursing with no additional acute issues to report.    Review of Systems   All systems reviewed and negative unless stated otherwise under subjective.    Objective   Objective     Vitals:    Temp:  [97.8 °F (36.6 °C)-98.6 °F (37 °C)] 98.4 °F (36.9 °C)  Heart Rate:  [69-89] 82  Resp:  [18-24] 18  BP: (108-135)/(40-62) 110/40  Flow (L/min):  [3-3.5] 3.5  Physical Exam   Gen: No acute distress, sitting up on edge of bed, conversant, pleasant  HEENT: MMM, Atraumatic  Neck: Supple, Trachea midline  Resp: CTAB, decreased bilateral rhonchi noted, equal chest rise bilaterally, normal respiratory effort  Card: RRR, No m/r/g  Abd: Soft, Nontender, Nondistended, + bowel sounds  Ext: No cyanosis, No clubbing  Neuro: CN II-XII grossly intact, No focal deficits appreciated  Psych: AAO x 3, Normal mood, Normal affect    Result Review    Result Review:  I have personally reviewed the results as below and agree with these findings:  []  Laboratory:   CMP    CMP 11/30/22 12/1/22 12/2/22   Glucose 138 (A) 100 (A) 126 (A)   BUN 38 (A) 26 (A) 24 (A)   Creatinine 0.53 (A) 0.62 0.71   Sodium 139 135 (A) 138   Potassium 4.4 4.6 4.5   Chloride 88 (A) 88 (A) 91 (A)   Calcium 11.0 (A) 10.2 11.0 (A)   (A) Abnormal value       Comments are available for some flowsheets but are not being displayed.           CBC    CBC 11/30/22 12/1/22 12/2/22   WBC 9.86 9.60 8.36   RBC 4.03 3.92 4.04   Hemoglobin 12.8 12.4 12.7   Hematocrit 39.6 38.8 38.9   MCV 98.3 (A) 99.0 (A) 96.3   MCH 31.8 31.6 31.4   MCHC 32.3 32.0 32.6   RDW 12.0 (A) 12.3 12.3   Platelets 152 179 167   (A) Abnormal value            []  Microbiology:   []  Radiology:   []  EKG/Telemetry:   []  Cardiology/Vascular:    []  Pathology:  []  Old records:  []  Other:    Assessment & Plan   Assessment / Plan     Assessment:  Acute on chronic hypercarbic and hypoxemic respiratory failure requiring NIPPV  Acute COPD exacerbation  Human metapneumovirus pneumonia  Medical noncompliance  Obesity with BMI: 37.42  Type 2 diabetes mellitus with hyperglycemia  Anxiety  Contraction alkalosis  Hypertension    Plan:  -Pulmonology/Critical Care consulted and following, appreciate assistance  and recommendations in the care of this patient.  -Continue supplemental O2 to maintain sats greater than 90%, wean as tolerated  -Continue home trilogy at night and with naps and as needed throughout the day  -Continue Brovana, Pulmicort and duo nebs  -Continue Singulair  -Blood glucose well controlled with current insulin regimen.  No changes at this time.  -PT/OT consulted.  Patient encouraged to increase activity.  Out of bed to chair as tolerated.  I believe that with the continued work with skilled therapy services patient's condition will improve and eventually patient would be able to return home.  -Continue appropriate home medications  -Will monitor electrolytes and renal function with BMP and magnesium level in the AM  -Will monitor WBC and Hgb with CBC in the AM  -Clinical course will dictate further management     DVT Prophylaxis: Lovenox  Diet: Regular  Dispo: Social work making referrals  Code Status: Full code     Personally reviewed patients labs and imaging, discussed with patient and nurse at bedside. Discussed case with the following consultants: Pulmonology/Critical Care.     Part of this note may be an electronic transcription/translation of spoken language to printed text using the Dragon dictation system.    DVT prophylaxis:  Medical DVT prophylaxis orders are present.    CODE STATUS:   Code Status (Patient has no pulse and is not breathing): CPR (Attempt to Resuscitate)  Medical Interventions (Patient has pulse or is breathing): Full Support  Release to patient: Routine Release      Electronically signed by Julio Elder MD, 12/02/22, 8:33 AM EST.

## 2022-12-02 NOTE — PAYOR COMM NOTE
"Fabi Manuel (57 y.o. Female)     Date of Birth   1965    Social Security Number       Address   [address] [place] [id number]    Home Phone   [phone]    MRN   [id number]       Zoroastrianism   None    Marital Status   Single                            Admission Date   11/18/22    Admission Type   Emergency    Admitting Provider   Julio Elder MD    Attending Provider   Julio Elder MD    Department, Room/Bed   [address], 4008/1       Discharge Date       Discharge Disposition       Discharge Destination                               Attending Provider: Julio Elder MD    Allergies: Acetaminophen, Adhesive Tape, Baclofen, Budesonide-formoterol Fumarate, Cephalexin, Cephalosporins, Codeine, Fluticasone-salmeterol, Hydrocodone-acetaminophen, Hydroxyzine Hcl, Iron, Oxycodone, Oxycodone-acetaminophen, Penicillins, Prednisone, Sulfamethoxazole, Sulfamethoxazole-trimethoprim, Tiotropium Bromide Monohydrate, Trimethoprim, Vancomycin, Zafirlukast    Isolation: Contact   Infection: Human Metapneumovirus  (11/21/22)   Code Status: CPR    Ht: 157.5 cm (62\")   Wt: 98.4 kg (216 lb 14.9 oz)    Admission Cmt: None   Principal Problem: Acute on chronic respiratory failure with hypoxia and hypercapnia (HCC) [J96.21,J96.22]                 Active Insurance as of 11/18/2022     Primary Coverage     Payor Plan Insurance Group Employer/Plan Group    [facility] [place] BY EVANGELINA [facility] BY EVANGELINA [id number]     Payor Plan Address Payor Plan Phone Number Payor Plan Fax Number Effective Dates    [address] [address]   1/1/2021 - None Entered    [facility] [id number]       Subscriber Name Subscriber Birth Date Member ID       FABI MANUEL 1965 [id number]                 Emergency Contacts      (Rel.) Home Phone Work Phone Mobile Phone    JANNET MANUEL (Brother) [phone] -- [phone]      [id number] UPDATE DUE     CONTACT   ARTIE S UTILIZATION " REVIEW    [facility]  [address] [address]  TAX ID [id number]  NP  [id number]       [phone]   [phone]         Physician Progress Notes (last 48 hours)      Donte Mcqueen MD at 22 0966          Pulmonary / Critical Care Progress Note      Patient Name: Nova Manuel  : 1965  MRN: [id number]  Primary Care Physician:  Dana Banks, TRUDI  Date of admission: 2022    Subjective   Subjective   Follow up for COPD exacerbation with FEV1 13%.     No acute events overnight.  Wore home trilogy.     This morning,  On 3 L of oxygen  Slowly improving  Has decreasing dry cough with no sputum production or hemoptysis  No wheezing  Dyspnea improved  Is weak and fatigued  No chest pain or hemoptysis  No fever or chills     Review of Systems  General: Fatigue, otherwise denied complaints  Respiratory: Dyspnea, cough, otherwise denied complaints  Cardiovascular: denied complaints  GI: Denied complaints  MSK: Weakness, otherwise denied complaints      Objective   Objective     Vitals:   Temp:  [97.8 °F (36.6 °C)-98.6 °F (37 °C)] 98.4 °F (36.9 °C)  Heart Rate:  [69-89] 82  Resp:  [18-24] 18  BP: (108-135)/(40-62) 110/40  Flow (L/min):  [3-3.5] 3.5    Physical Exam   Vital Signs Reviewed   General:  WDWN female, Awake and Alert, NAD  HEENT:  PERRL, EOMI   Neck:  No JVD, no thyromegaly  Chest: Good aeration, scant rhonchi bilaterally, barrel chested, tympanic to percussion bilaterally, pursed lip breathing noted  CV: RRR, no M/G/R, pulses 2+  Abd:  Soft, NT, ND, +BS, obese  EXT:  no clubbing, no cyanosis, no edema  Neuro:  A&Ox3, CN grossly intact, no focal deficits  Skin: No rashes or lesions noted      Result Review    Result Review:  I have personally reviewed the results from the time of this admission to 2022 09:28 EST and agree with these findings:  [x]  Laboratory  [x]  Microbiology  [x]  Radiology  [x]  EKG/Telemetry   [x]  Cardiology/Vascular    []  Pathology  []  Old records  []  Other:  Most notable findings include:   [id number]  11/21 human metapneumovirus        Lab 12/02/22  0518 12/01/22  0440 11/30/22  0437 11/29/22  0323 11/28/22  0518 11/27/22  0337 11/26/22  0317 11/25/22  0950   WBC 8.36 9.60 9.86 11.18* 12.94* 12.59* 11.42* 10.58   HEMOGLOBIN 12.7 12.4 12.8 13.4 15.1 14.5 13.6 13.8   HEMATOCRIT 38.9 38.8 39.6 41.5 47.7* 44.8 42.7 43.7   PLATELETS 167 179 152 152 182 185 177 200   SODIUM 138 135* 139 138 137 139 139 141   POTASSIUM 4.5 4.6 4.4 4.4 4.1 4.2 3.9 4.3   CHLORIDE 91* 88* 88* 86* 84* 88* 89* 89*   CO2 45.3* 43.0* >50.0* 49.3* 48.0* 49.9* 44.8* 42.8*   BUN 24* 26* 38* 53* 41* 31* 40* 57*   CREATININE 0.71 0.62 0.53* 0.74 0.65 0.61 0.67 1.19*   GLUCOSE 126* 100* 138* 132* 186* 143* 130* 134*   CALCIUM 11.0* 10.2 11.0* 10.8* 11.0* 10.5 10.1 10.1   PHOSPHORUS 3.0 2.6  --   --   --   --   --  2.8         Assessment & Plan   Assessment / Plan     Active Hospital Problems:  Active Hospital Problems    Diagnosis    • **Acute on chronic respiratory failure with hypoxia and hypercapnia (HCC)    • COPD with acute exacerbation (HCC)    • Restrictive lung disease      Impression:   Acute exacerbation of COPD, FEV1 13%  Human metapneumovirus PNA  Non-compliant with medication recommendations  Acute on chronic hypoxic hypercapnic respiratory failure: home trilogy and 4 L   Morbid Obesity: BMI 40.89  Type II Diabetes with hyperglycemia  Anxiety  Contraction alkolosis     Plan:   Home oxygen is 4 L.  She is doing better than that currently.  Goal SPO2 88 to 92%  Continue home trilogy to use at night and as needed days..  Completed steroids  Continue nebulizers and bronchopulmonary team.  Encourage I-S and flutter valve.  Continue Singulair.  Diet per speech therapy.  Appreciate assistance  Increase activity.  Out of bed to chair.  PT/OT on board.  Awaiting rehab.     Has appt in COPD clinic with Hallie on 12/8 at 0900.     DVT prophylaxis:  Medical  DVT prophylaxis orders are present.    CODE STATUS:   Code Status (Patient has no pulse and is not breathing): CPR (Attempt to Resuscitate)  Medical Interventions (Patient has pulse or is breathing): Full Support  Release to patient: Routine Release    Stable to discharge to rehab from my perspective      Labs, microbiology, radiology, medications, and provider notes personally reviewed.  Discussed with primary services and bedside RN.     Electronically signed by Donte Mcqueen MD, 22, 12:50 PM EST.      Electronically signed by Donte Mcqueen MD at 22 1250     Julio Elder MD at 22 0833          [place] Hospitalist Progress Note  Date: 2022  Patient Name: Nova Manuel  : 1965  MRN: [id number]  Date of admission: 2022  Consultants:   -Pulmonology/Critical Care: Dr. Donte Mcqueen, Dr. Cheryle Silverio, Dr. Kamran Marquez, Dr. Levi Infante    Subjective   Subjective     Chief Complaint: Shortness of breath    Summary:   Nova Manuel is a 57 y.o. female with severe COPD (FEV1: 13%), chronic hypoxemic and hypercapnic respiratory failure on 2 L O2 via nasal cannula and nocturnal NIPPV who presented to the ED with complaints of worsening shortness of breath.  Upon evaluation in ED patient was in respiratory distress and started on NIPPV with improvement in symptoms.  CXR did not demonstrate acute infiltrate or effusion.  CT chest negative for PE.  Hospitalist service contacted for further evaluation management.  Pulmonology consulted.  Systemic steroids, and inhaled bronchodilators and empiric antibiotics initiated.  Respiratory panel returned positive for human metapneumovirus.  Patient continued on BiPAP and respiratory function with some improvement.  Given patient's COPD severity palliative care was consulted and decision made to continue with aggressive care measures.  Patient unable to tolerate much p.o. intake some core track was placed and nutrition initiated.   Patient's respiratory status has been improving and patient able to be weaned from BiPAP to Airvo and subsequently to nasal cannula BiPAP at night.    Interval Followup:   No acute events overnight.  Patient continues to have cough but notes that it is improving.  She also states that her shortness of breath is improving slightly.  She continues to be weak and is easily fatigued.  Denies any chest pain, abdominal pain, nausea or vomiting.  Nursing with no additional acute issues to report.    Review of Systems   All systems reviewed and negative unless stated otherwise under subjective.    Objective   Objective     Vitals:   Temp:  [97.8 °F (36.6 °C)-98.6 °F (37 °C)] 98.4 °F (36.9 °C)  Heart Rate:  [69-89] 82  Resp:  [18-24] 18  BP: (108-135)/(40-62) 110/40  Flow (L/min):  [3-3.5] 3.5  Physical Exam   Gen: No acute distress, sitting up on edge of bed, conversant, pleasant  HEENT: MMM, Atraumatic  Neck: Supple, Trachea midline  Resp: CTAB, decreased bilateral rhonchi noted, equal chest rise bilaterally, normal respiratory effort  Card: RRR, No m/r/g  Abd: Soft, Nontender, Nondistended, + bowel sounds  Ext: No cyanosis, No clubbing  Neuro: CN II-XII grossly intact, No focal deficits appreciated  Psych: AAO x 3, Normal mood, Normal affect    Result Review    Result Review:  I have personally reviewed the results as below and agree with these findings:  []  Laboratory:   CMP    CMP 11/30/22 12/1/22 12/2/22   Glucose 138 (A) 100 (A) 126 (A)   BUN 38 (A) 26 (A) 24 (A)   Creatinine 0.53 (A) 0.62 0.71   Sodium 139 135 (A) 138   Potassium 4.4 4.6 4.5   Chloride 88 (A) 88 (A) 91 (A)   Calcium 11.0 (A) 10.2 11.0 (A)   (A) Abnormal value       Comments are available for some flowsheets but are not being displayed.           CBC    CBC 11/30/22 12/1/22 12/2/22   WBC 9.86 9.60 8.36   RBC 4.03 3.92 4.04   Hemoglobin 12.8 12.4 12.7   Hematocrit 39.6 38.8 38.9   MCV 98.3 (A) 99.0 (A) 96.3   MCH 31.8 31.6 31.4   MCHC 32.3 32.0 32.6    RDW 12.0 (A) 12.3 12.3   Platelets 152 179 167   (A) Abnormal value            []  Microbiology:   []  Radiology:   []  EKG/Telemetry:   []  Cardiology/Vascular:    []  Pathology:  []  Old records:  []  Other:    Assessment & Plan   Assessment / Plan     Assessment:  Acute on chronic hypercarbic and hypoxemic respiratory failure requiring NIPPV  Acute COPD exacerbation  Human metapneumovirus pneumonia  Medical noncompliance  Obesity with BMI: 37.42  Type 2 diabetes mellitus with hyperglycemia  Anxiety  Contraction alkalosis  Hypertension    Plan:  -Pulmonology/Critical Care consulted and following, appreciate assistance and recommendations in the care of this patient.  -Continue supplemental O2 to maintain sats greater than 90%, wean as tolerated  -Continue home trilogy at night and with naps and as needed throughout the day  -Continue Brovana, Pulmicort and duo nebs  -Continue Singulair  -Blood glucose well controlled with current insulin regimen.  No changes at this time.  -PT/OT consulted.  Patient encouraged to increase activity.  Out of bed to chair as tolerated.  I believe that with the continued work with skilled therapy services patient's condition will improve and eventually patient would be able to return home.  -Continue appropriate home medications  -Will monitor electrolytes and renal function with BMP and magnesium level in the AM  -Will monitor WBC and Hgb with CBC in the AM  -Clinical course will dictate further management     DVT Prophylaxis: Lovenox  Diet: Regular  Dispo: Social work making referrals  Code Status: Full code     Personally reviewed patients labs and imaging, discussed with patient and nurse at bedside. Discussed case with the following consultants: Pulmonology/Critical Care.     Part of this note may be an electronic transcription/translation of spoken language to printed text using the Dragon dictation system.    DVT prophylaxis:  Medical DVT prophylaxis orders are  present.    CODE STATUS:   Code Status (Patient has no pulse and is not breathing): CPR (Attempt to Resuscitate)  Medical Interventions (Patient has pulse or is breathing): Full Support  Release to patient: Routine Release      Electronically signed by Julio Elder MD, 22, 8:33 AM EST.      Electronically signed by Julio Elder MD at 22 1443     Julio Elder MD at 22 0822          [facility]   Hospitalist Progress Note  Date: 2022  Patient Name: Nova Manuel  : 1965  MRN: [id number]  Date of admission: 2022  Consultants:   -Pulmonology/Critical Care: Dr. Donte Mcqueen, Dr. Cheryle Silverio, Dr. Kamran Marquez, Dr. Levi Infante    Subjective   Subjective     Chief Complaint: Shortness of breath    Summary:   Nova Manuel is a 57 y.o. female with severe COPD (FEV1: 13%), chronic hypoxemic and hypercapnic respiratory failure on 2 L O2 via nasal cannula and nocturnal NIPPV who presented to the ED with complaints of worsening shortness of breath.  Upon evaluation in ED patient was in respiratory distress and started on NIPPV with improvement in symptoms.  CXR did not demonstrate acute infiltrate or effusion.  CT chest negative for PE.  Hospitalist service contacted for further evaluation management.  Pulmonology consulted.  Systemic steroids, and inhaled bronchodilators and empiric antibiotics initiated.  Respiratory panel returned positive for human metapneumovirus.  Patient continued on BiPAP and respiratory function with some improvement.  Given patient's COPD severity palliative care was consulted and decision made to continue with aggressive care measures.  Patient unable to tolerate much p.o. intake some core track was placed and nutrition initiated.  Patient's respiratory status has been improving and patient able to be weaned from BiPAP to Airvo and subsequently to nasal cannula BiPAP at night.    Interval Followup:   Patient with some hypotension overnight, did  receive 250 mL bolus with minimal improvement, patient was then placed in Trendelenburg and blood pressure improved.  Patient's blood pressure improved this morning.  Patient resting comfortably this morning.  She denies any chest pain, abdominal pain, nausea or vomiting.  She states that her shortness of air continues to improve.  Nursing with no additional acute issues to report.    Review of Systems   All systems reviewed and negative unless stated otherwise under subjective.    Objective   Objective     Vitals:   Temp:  [97.7 °F (36.5 °C)-98.2 °F (36.8 °C)] 98.1 °F (36.7 °C)  Heart Rate:  [66-89] 89  Resp:  [18-22] 22  BP: ()/(38-48) 120/40  Flow (L/min):  [3.5-4] 3.5  Physical Exam   Gen: No acute distress, lying in bed resting comfortably, easily arousable, conversant, pleasant  HEENT: MMM, Atraumatic  Neck: Supple, Trachea midline  Resp: CTAB, decreased bilateral rhonchi noted, no increase in work of breathing, equal chest rise bilaterally  Card: RRR, No m/r/g  Abd: Soft, Nontender, Nondistended, + bowel sounds  Ext: No cyanosis, No clubbing  Neuro: CN II-XII grossly intact, No focal deficits appreciated  Psych: AAO x 3, Normal mood, Normal affect    Result Review    Result Review:  I have personally reviewed the results as below and agree with these findings:  []  Laboratory:   CMP    CMP 11/29/22 11/30/22 12/1/22   Glucose 132 (A) 138 (A) 100 (A)   BUN 53 (A) 38 (A) 26 (A)   Creatinine 0.74 0.53 (A) 0.62   Sodium 138 139 135 (A)   Potassium 4.4 4.4 4.6   Chloride 86 (A) 88 (A) 88 (A)   Calcium 10.8 (A) 11.0 (A) 10.2   (A) Abnormal value       Comments are available for some flowsheets but are not being displayed.           CBC    CBC 11/29/22 11/30/22 12/1/22   WBC 11.18 (A) 9.86 9.60   RBC 4.28 4.03 3.92   Hemoglobin 13.4 12.8 12.4   Hematocrit 41.5 39.6 38.8   MCV 97.0 98.3 (A) 99.0 (A)   MCH 31.3 31.8 31.6   MCHC 32.3 32.3 32.0   RDW 12.1 (A) 12.0 (A) 12.3   Platelets 152 152 179   (A) Abnormal  value            []  Microbiology:   []  Radiology:   [x]  EKG/Telemetry: No acute events.  Sinus rhythm.  Tachycardia noted.  []  Cardiology/Vascular:    []  Pathology:  []  Old records:  []  Other:    Assessment & Plan   Assessment / Plan     Assessment:  Acute on chronic hypercarbic and hypoxemic respiratory failure requiring NIPPV  Acute COPD exacerbation  Human metapneumovirus pneumonia  Medical noncompliance  Obesity with BMI: 37.42  Type 2 diabetes mellitus with hyperglycemia  Anxiety  Contraction alkalosis  Hypertension    Plan:  -Pulmonology/Critical Care consulted and following, appreciate assistance and recommendations in the care of this patient.  -Continue supplemental O2 to maintain sats greater than 90%, wean as tolerated  -Continue home trilogy at night and with naps and as needed throughout the day  -Discontinue Decadron.  -Continue Brovana, Pulmicort and duo nebs  -Continue Singulair  -Continue SSI.  Monitor blood glucose level and SSI requirement titrate insulin regimen accordingly  -PT/OT consulted.  Patient encouraged to increase activity.  Out of bed to chair as tolerated.  I believe that with the continued work with skilled therapy services patient's condition will improve and eventually patient would be able to return home.  -Continue appropriate home medications  -Discontinue telemetry  -Monitor electrolytes and renal function with BMP and magnesium level in the AM  -Monitor WBC and Hgb with CBC in the AM  -Clinical course will dictate further management     DVT Prophylaxis: Lovenox  Diet: Regular  Dispo: PT/OT consulted  Code Status: Full code     Personally reviewed patients labs and imaging, discussed with patient and nurse at bedside. Discussed case with the following consultants: Pulmonology/Critical Care.     Part of this note may be an electronic transcription/translation of spoken language to printed text using the Dragon dictation system.    DVT prophylaxis:  Medical DVT prophylaxis  orders are present.    CODE STATUS:   Code Status (Patient has no pulse and is not breathing): CPR (Attempt to Resuscitate)  Medical Interventions (Patient has pulse or is breathing): Full Support  Release to patient: Routine Release        Electronically signed by Julio Elder MD, 22, 8:23 AM EST.      Electronically signed by Julio Elder MD at 22 [phone]     Donte Mcqueen MD at 22 9495          Pulmonary / Critical Care Progress Note      Patient Name: Nova Manuel  : 1965  MRN: [id number]  Primary Care Physician:  Dana Banks, TRUDI  Date of admission: 2022    Subjective   Subjective   Follow up for COPD exacerbation with FEV1 13%.     No acute events overnight.  Wore home trilogy.     This morning,  Lying in bed on 3 L nasal cannula  Breathing continues to improve each day  Nonproductive cough  Sore throat improved after core track removed  Tolerating diet  No chest pain  No fever or chills  Weak and fatigued     Review of Systems  General: Fatigue, otherwise denied complaints  HEENT: Denied complaints  Respiratory: Dyspnea, otherwise denied complaints  Cardiovascular: SCHROEDER, otherwise denied complaints  GI: Denied complaints  : Denied complaints  MSK: Weakness, otherwise denied complaints    Objective   Objective     Vitals:   Temp:  [97.7 °F (36.5 °C)-98.2 °F (36.8 °C)] 97.7 °F (36.5 °C)  Heart Rate:  [66-87] 85  Resp:  [18-22] 22  BP: ()/(38-48) 112/48  Flow (L/min):  [3.5-4] 3.5    Physical Exam   Vital Signs Reviewed   General:  WDWN female, Awake and Alert, NAD on 3 L NC  HEENT:  PERRL, EOMI   Neck:  No JVD, no thyromegaly  Chest: Good aeration, decreasing rhonchi bilaterally, barrel chested, tympanic to percussion bilaterally, pursed lip breathing noted  CV: RRR, no M/G/R, pulses 2+  Abd:  Soft, NT, ND, +BS, obese  EXT:  no clubbing, no cyanosis, no edema  Neuro:  A&Ox3, CN grossly intact, no focal deficits  Skin: No rashes or lesions noted    Exam  simultaneously performed with my NP, Noemi Smallwood, and I agree with the above mentioned findings.    Result Review    Result Review:  I have personally reviewed the results from the time of this admission to 12/1/2022 07:59 EST and agree with these findings:  [x]  Laboratory  [x]  Microbiology  [x]  Radiology  [x]  EKG/Telemetry   [x]  Cardiology/Vascular   []  Pathology  []  Old records  []  Other:  Most notable findings include:   [id number]  Potassium 4.6, creatinine 0.62, magnesium 1.9, WBC 9.6  CO2 >50 --> 43    11/21 human metapneumovirus        Lab 12/01/22  0440 11/30/22  0437 11/29/22  0323 11/28/22  0518 11/27/22  0337 11/26/22  0317 11/25/22  0950   WBC 9.60 9.86 11.18* 12.94* 12.59* 11.42* 10.58   HEMOGLOBIN 12.4 12.8 13.4 15.1 14.5 13.6 13.8   HEMATOCRIT 38.8 39.6 41.5 47.7* 44.8 42.7 43.7   PLATELETS 179 152 152 182 185 177 200   SODIUM 135* 139 138 137 139 139 141   POTASSIUM 4.6 4.4 4.4 4.1 4.2 3.9 4.3   CHLORIDE 88* 88* 86* 84* 88* 89* 89*   CO2 43.0* >50.0* 49.3* 48.0* 49.9* 44.8* 42.8*   BUN 26* 38* 53* 41* 31* 40* 57*   CREATININE 0.62 0.53* 0.74 0.65 0.61 0.67 1.19*   GLUCOSE 100* 138* 132* 186* 143* 130* 134*   CALCIUM 10.2 11.0* 10.8* 11.0* 10.5 10.1 10.1   PHOSPHORUS 2.6  --   --   --   --   --  2.8       Assessment & Plan   Assessment / Plan     Active Hospital Problems:  Active Hospital Problems    Diagnosis    • **Acute on chronic respiratory failure with hypoxia and hypercapnia (HCC)    • COPD with acute exacerbation (HCC)    • Restrictive lung disease      Impression:   Acute exacerbation of COPD, FEV1 13%  Human metapneumovirus PNA  Non-compliant with medication recommendations  Acute on chronic hypoxic hypercapnic respiratory failure: home trilogy and 4 L   Morbid Obesity: BMI 40.89  Type II Diabetes with hyperglycemia  Anxiety  Contraction alkolosis     Plan:   -On 3 L NC. Continue wean O2 to keep sats greater than 90%.   Home O2 requirement 4 L nasal cannula.  -Continue home trilogy to  use at night and as needed days..  -CO2 >50  --> 32, improved after Diamox 500 mg x1.  -Has completed 12 days of steroids, will discontinue Decadron.  -Continue nebulizers and bronchopulmonary team.  Encourage I-S and flutter valve.  -Continue Singulair.  -Speech therapy on board and appreciate assistance. Has been approved for regular diet.   -Increase activity.  Out of bed to chair.  -PT/OT on board.  Awaiting rehab.     -Has appt in COPD clinic with Hallie on 12/8 at 0900.     DVT prophylaxis:  Medical DVT prophylaxis orders are present.    CODE STATUS:   Code Status (Patient has no pulse and is not breathing): CPR (Attempt to Resuscitate)  Medical Interventions (Patient has pulse or is breathing): Full Support  Release to patient: Routine Release    Labs, microbiology, radiology, medications, and provider notes personally reviewed.  Discussed with primary services and bedside RN. 30 minutes critical care time spent managing this patient, excluding procedures.    Electronically signed by TRUDI Chavez, 12/01/22, 11:04 AM EST.    I, Dr. Donte Mcqueen, have spent more than 50% of the total time managing the patient in this encounter today.  This included personally reviewing all pertinent labs, imaging, microbiology and documentation. Also discussing the case with the patient and any available family, the admitting physician and any available ancillary staff.    Electronically signed by Donte Mcqueen MD, 12/01/22, 2:16 PM EST.        Electronically signed by Donte Mcqueen MD at 12/01/22 1416       Consult Notes (last 48 hours)  Notes from 11/30/22 1519 through 12/02/22 1519   No notes of this type exist for this encounter.

## 2022-12-02 NOTE — PLAN OF CARE
Goal Outcome Evaluation:              Outcome Evaluation: Transfer this shift. AOx4. Remains on 3.5L NC. Turning and repositioning self in bed. No complaints noted at this time.

## 2022-12-03 LAB
ANION GAP SERPL CALCULATED.3IONS-SCNC: 3.8 MMOL/L (ref 5–15)
BUN SERPL-MCNC: 23 MG/DL (ref 6–20)
BUN/CREAT SERPL: 31.9 (ref 7–25)
CALCIUM SPEC-SCNC: 10.2 MG/DL (ref 8.6–10.5)
CHLORIDE SERPL-SCNC: 92 MMOL/L (ref 98–107)
CO2 SERPL-SCNC: 41.2 MMOL/L (ref 22–29)
CREAT SERPL-MCNC: 0.72 MG/DL (ref 0.57–1)
DEPRECATED RDW RBC AUTO: 42.1 FL (ref 37–54)
EGFRCR SERPLBLD CKD-EPI 2021: 97.7 ML/MIN/1.73
ERYTHROCYTE [DISTWIDTH] IN BLOOD BY AUTOMATED COUNT: 12 % (ref 12.3–15.4)
GLUCOSE BLDC GLUCOMTR-MCNC: 114 MG/DL (ref 70–99)
GLUCOSE BLDC GLUCOMTR-MCNC: 137 MG/DL (ref 70–99)
GLUCOSE BLDC GLUCOMTR-MCNC: 139 MG/DL (ref 70–99)
GLUCOSE BLDC GLUCOMTR-MCNC: 156 MG/DL (ref 70–99)
GLUCOSE SERPL-MCNC: 125 MG/DL (ref 65–99)
HCT VFR BLD AUTO: 38.3 % (ref 34–46.6)
HGB BLD-MCNC: 12.6 G/DL (ref 12–15.9)
MAGNESIUM SERPL-MCNC: 1.8 MG/DL (ref 1.6–2.6)
MCH RBC QN AUTO: 31.3 PG (ref 26.6–33)
MCHC RBC AUTO-ENTMCNC: 32.9 G/DL (ref 31.5–35.7)
MCV RBC AUTO: 95.3 FL (ref 79–97)
PLATELET # BLD AUTO: 188 10*3/MM3 (ref 140–450)
PMV BLD AUTO: 12 FL (ref 6–12)
POTASSIUM SERPL-SCNC: 4.4 MMOL/L (ref 3.5–5.2)
RBC # BLD AUTO: 4.02 10*6/MM3 (ref 3.77–5.28)
SODIUM SERPL-SCNC: 137 MMOL/L (ref 136–145)
WBC NRBC COR # BLD: 7.63 10*3/MM3 (ref 3.4–10.8)

## 2022-12-03 PROCEDURE — 80048 BASIC METABOLIC PNL TOTAL CA: CPT | Performed by: INTERNAL MEDICINE

## 2022-12-03 PROCEDURE — 94799 UNLISTED PULMONARY SVC/PX: CPT

## 2022-12-03 PROCEDURE — 25010000002 ONDANSETRON PER 1 MG: Performed by: INTERNAL MEDICINE

## 2022-12-03 PROCEDURE — 25010000002 ENOXAPARIN PER 10 MG: Performed by: FAMILY MEDICINE

## 2022-12-03 PROCEDURE — 99232 SBSQ HOSP IP/OBS MODERATE 35: CPT | Performed by: INTERNAL MEDICINE

## 2022-12-03 PROCEDURE — 85027 COMPLETE CBC AUTOMATED: CPT | Performed by: INTERNAL MEDICINE

## 2022-12-03 PROCEDURE — 82962 GLUCOSE BLOOD TEST: CPT

## 2022-12-03 PROCEDURE — 94761 N-INVAS EAR/PLS OXIMETRY MLT: CPT

## 2022-12-03 PROCEDURE — 83735 ASSAY OF MAGNESIUM: CPT | Performed by: INTERNAL MEDICINE

## 2022-12-03 RX ADMIN — SPIRONOLACTONE 25 MG: 25 TABLET ORAL at 09:02

## 2022-12-03 RX ADMIN — IPRATROPIUM BROMIDE AND ALBUTEROL SULFATE 3 ML: 2.5; .5 SOLUTION RESPIRATORY (INHALATION) at 00:25

## 2022-12-03 RX ADMIN — DIVALPROEX SODIUM 125 MG: 125 CAPSULE, COATED PELLETS ORAL at 21:26

## 2022-12-03 RX ADMIN — IPRATROPIUM BROMIDE AND ALBUTEROL SULFATE 3 ML: 2.5; .5 SOLUTION RESPIRATORY (INHALATION) at 12:27

## 2022-12-03 RX ADMIN — ARFORMOTEROL TARTRATE INHALATION 15 MCG: 15 SOLUTION RESPIRATORY (INHALATION) at 06:17

## 2022-12-03 RX ADMIN — ALPRAZOLAM 0.5 MG: 0.25 TABLET ORAL at 21:26

## 2022-12-03 RX ADMIN — BUDESONIDE 0.25 MG: 0.25 INHALANT ORAL at 06:17

## 2022-12-03 RX ADMIN — ONDANSETRON 4 MG: 2 INJECTION INTRAMUSCULAR; INTRAVENOUS at 09:11

## 2022-12-03 RX ADMIN — Medication 10 ML: at 09:01

## 2022-12-03 RX ADMIN — ARFORMOTEROL TARTRATE INHALATION 15 MCG: 15 SOLUTION RESPIRATORY (INHALATION) at 19:16

## 2022-12-03 RX ADMIN — ENOXAPARIN SODIUM 40 MG: 100 INJECTION SUBCUTANEOUS at 09:01

## 2022-12-03 RX ADMIN — IPRATROPIUM BROMIDE AND ALBUTEROL SULFATE 3 ML: 2.5; .5 SOLUTION RESPIRATORY (INHALATION) at 19:16

## 2022-12-03 RX ADMIN — BUTALBITAL, ACETAMINOPHEN AND CAFFEINE 1 CAPSULE: 300; 40; 50 CAPSULE ORAL at 21:26

## 2022-12-03 RX ADMIN — LEVOTHYROXINE SODIUM 125 MCG: 125 TABLET ORAL at 05:27

## 2022-12-03 RX ADMIN — ASPIRIN 81 MG CHEWABLE TABLET 81 MG: 81 TABLET CHEWABLE at 09:02

## 2022-12-03 RX ADMIN — ALPRAZOLAM 0.5 MG: 0.25 TABLET ORAL at 00:31

## 2022-12-03 RX ADMIN — SERTRALINE HYDROCHLORIDE 200 MG: 100 TABLET ORAL at 09:02

## 2022-12-03 RX ADMIN — ALPRAZOLAM 0.5 MG: 0.25 TABLET ORAL at 06:43

## 2022-12-03 RX ADMIN — IPRATROPIUM BROMIDE AND ALBUTEROL SULFATE 3 ML: 2.5; .5 SOLUTION RESPIRATORY (INHALATION) at 06:17

## 2022-12-03 RX ADMIN — ALPRAZOLAM 0.5 MG: 0.25 TABLET ORAL at 12:48

## 2022-12-03 RX ADMIN — BUDESONIDE 0.25 MG: 0.25 INHALANT ORAL at 19:16

## 2022-12-03 RX ADMIN — ATENOLOL 50 MG: 50 TABLET ORAL at 09:01

## 2022-12-03 RX ADMIN — MONTELUKAST 10 MG: 10 TABLET, FILM COATED ORAL at 09:02

## 2022-12-03 RX ADMIN — DIVALPROEX SODIUM 125 MG: 125 CAPSULE, COATED PELLETS ORAL at 09:01

## 2022-12-03 NOTE — PROGRESS NOTES
[facility]   Hospitalist Progress Note  Date: 12/3/2022  Patient Name: Nova Manuel  : 1965  MRN: [id number]  Date of admission: 2022  Consultants:   -Pulmonology/Critical Care: Dr. Donte Mcqueen, Dr. Cheryle Silverio, Dr. Kamran Marquez, Dr. Levi Infante    Subjective   Subjective     Chief Complaint: Shortness of breath    Summary:   Nova Manuel is a 57 y.o. female with severe COPD (FEV1: 13%), chronic hypoxemic and hypercapnic respiratory failure on 2 L O2 via nasal cannula and nocturnal NIPPV who presented to the ED with complaints of worsening shortness of breath.  Upon evaluation in ED patient was in respiratory distress and started on NIPPV with improvement in symptoms.  CXR did not demonstrate acute infiltrate or effusion.  CT chest negative for PE.  Hospitalist service contacted for further evaluation management.  Pulmonology consulted.  Systemic steroids, and inhaled bronchodilators and empiric antibiotics initiated.  Respiratory panel returned positive for human metapneumovirus.  Patient continued on BiPAP and respiratory function with some improvement.  Given patient's COPD severity palliative care was consulted and decision made to continue with aggressive care measures.  Patient unable to tolerate much p.o. intake some core track was placed and nutrition initiated.  Patient's respiratory status has been improving and patient able to be weaned from BiPAP to Airvo and subsequently to nasal cannula and home trilogy at night.    Interval Followup:   No acute events overnight.  Patient endorsed continued cough, says it is minimally productive at times.  Denied any worsening of her shortness of breath feels like it has been stable.  She denies any chest pain, abdominal pain, nausea or vomiting.  Nursing with no additional acute issues to report.    Review of Systems   All systems reviewed and negative unless stated otherwise under subjective.    Objective   Objective     Vitals:   Temp:   [97.6 °F (36.4 °C)-98.9 °F (37.2 °C)] 98.1 °F (36.7 °C)  Heart Rate:  [68-90] 72  Resp:  [16-20] 18  BP: (108-139)/(42-62) 139/42  Flow (L/min):  [2.5-3] 2.5  Physical Exam   Gen: No acute distress, conversant, pleasant, sitting up in bed  HEENT: MMM, Atraumatic  Neck: Supple, Trachea midline  Resp: CTAB, bilateral rhonchi improved, minimal conversational dyspnea noted, equal chest rise bilaterally, no increased work of breathing  Card: RRR, No m/r/g  Abd: Soft, Nontender, Nondistended, + bowel sounds  Ext: No cyanosis, No clubbing  Neuro: CN II-XII grossly intact, No focal deficits appreciated  Psych: AAO x 3, Normal mood, Normal affect    Result Review    Result Review:  I have personally reviewed the results as below and agree with these findings:  []  Laboratory:   CMP    CMP 12/1/22 12/2/22 12/3/22   Glucose 100 (A) 126 (A) 125 (A)   BUN 26 (A) 24 (A) 23 (A)   Creatinine 0.62 0.71 0.72   Sodium 135 (A) 138 137   Potassium 4.6 4.5 4.4   Chloride 88 (A) 91 (A) 92 (A)   Calcium 10.2 11.0 (A) 10.2   (A) Abnormal value       Comments are available for some flowsheets but are not being displayed.           CBC    CBC 12/1/22 12/2/22 12/3/22   WBC 9.60 8.36 7.63   RBC 3.92 4.04 4.02   Hemoglobin 12.4 12.7 12.6   Hematocrit 38.8 38.9 38.3   MCV 99.0 (A) 96.3 95.3   MCH 31.6 31.4 31.3   MCHC 32.0 32.6 32.9   RDW 12.3 12.3 12.0 (A)   Platelets 179 167 188   (A) Abnormal value            []  Microbiology:   []  Radiology:   []  EKG/Telemetry:   []  Cardiology/Vascular:    []  Pathology:  []  Old records:  []  Other:    Assessment & Plan   Assessment / Plan     Assessment:  Acute on chronic hypercarbic and hypoxemic respiratory failure requiring NIPPV  Acute COPD exacerbation  Human metapneumovirus pneumonia  Medical noncompliance  Obesity with BMI: 37.42  Type 2 diabetes mellitus with hyperglycemia  Anxiety  Contraction alkalosis  Hypertension    Plan:  -Pulmonology/Critical Care consulted and following, appreciate  assistance and recommendations in the care of this patient.  -Continue supplemental O2 to maintain sats greater than 90%, wean as tolerated  -Continue home trilogy at night and with naps and as needed throughout the day  -Continue Brovana, Pulmicort and duo nebs  -Continue Singulair  -Continue current insulin regimen.  Blood glucose well controlled.  -PT/OT consulted.  Patient encouraged to increase activity.  Out of bed to chair as tolerated.  I believe that with the continued work with skilled therapy services patient's condition will improve and eventually patient would be able to return home.  -Continue appropriate home medications  -Labs stable on review, will give patient lab holiday on 12/04/2022 unless there is an acute change in the patient's condition  -Clinical course will dictate further management     DVT Prophylaxis: Lovenox  Diet: Regular  Dispo: Social work making referrals  Code Status: Full code     Personally reviewed patients labs and imaging, discussed with patient and nurse at bedside. Discussed case with the following consultants: Pulmonology/Critical Care.     Part of this note may be an electronic transcription/translation of spoken language to printed text using the Dragon dictation system.    DVT prophylaxis:  Medical DVT prophylaxis orders are present.    CODE STATUS:   Code Status (Patient has no pulse and is not breathing): CPR (Attempt to Resuscitate)  Medical Interventions (Patient has pulse or is breathing): Full Support  Release to patient: Routine Release      Electronically signed by Julio Elder MD, 12/03/22, 9:40 AM EST.

## 2022-12-03 NOTE — PROGRESS NOTES
Pulmonary / Critical Care Progress Note      Patient Name: Nova Manuel  : 1965  MRN: [id number]  Primary Care Physician:  Dnaa Banks APRN  Date of admission: 2022    Subjective   Subjective   Follow up for COPD exacerbation with FEV1 13%.     No acute events overnight.  Wore home trilogy.     This morning,  On 3 L of oxygen  Slowly improving  Breathing slowly getting better  Feels like she is close to baseline     Review of Systems  General: Fatigue, otherwise denied complaints  Respiratory: Dyspnea, cough, otherwise denied complaints  Cardiovascular: denied complaints  GI: Denied complaints  MSK: Weakness, otherwise denied complaints      Objective   Objective     Vitals:   Temp:  [98.1 °F (36.7 °C)-98.2 °F (36.8 °C)] 98.1 °F (36.7 °C)  Heart Rate:  [68-90] 72  Resp:  [18-20] 18  BP: (108-139)/(42-58) 108/54  Flow (L/min):  [2.5-3] 3    Physical Exam   Vital Signs Reviewed   General:  WDWN female, Awake and Alert, NAD  HEENT:  PERRL, EOMI   Neck:  No JVD, no thyromegaly  Chest: Good aeration, scant rhonchi bilaterally, barrel chested, tympanic to percussion bilaterally, pursed lip breathing noted  CV: RRR, no M/G/R, pulses 2+  Abd:  Soft, NT, ND, +BS, obese  EXT:  no clubbing, no cyanosis, no edema  Neuro:  A&Ox3, CN grossly intact, no focal deficits  Skin: No rashes or lesions noted      Result Review    Result Review:  I have personally reviewed the results from the time of this admission to 12/3/2022 18:18 EST and agree with these findings:  [x]  Laboratory  [x]  Microbiology  [x]  Radiology  [x]  EKG/Telemetry   [x]  Cardiology/Vascular   []  Pathology  []  Old records  []  Other:  Most notable findings include:   [id number]   human metapneumovirus        Lab 22  0454 22  0518 22  0440 22  0437 22  0323 22  0518 22  0337   WBC 7.63 8.36 9.60 9.86 11.18* 12.94* 12.59*   HEMOGLOBIN 12.6 12.7 12.4 12.8 13.4 15.1 14.5   HEMATOCRIT 38.3 38.9  38.8 39.6 41.5 47.7* 44.8   PLATELETS 188 167 179 152 152 182 185   SODIUM 137 138 135* 139 138 137 139   POTASSIUM 4.4 4.5 4.6 4.4 4.4 4.1 4.2   CHLORIDE 92* 91* 88* 88* 86* 84* 88*   CO2 41.2* 45.3* 43.0* >50.0* 49.3* 48.0* 49.9*   BUN 23* 24* 26* 38* 53* 41* 31*   CREATININE 0.72 0.71 0.62 0.53* 0.74 0.65 0.61   GLUCOSE 125* 126* 100* 138* 132* 186* 143*   CALCIUM 10.2 11.0* 10.2 11.0* 10.8* 11.0* 10.5   PHOSPHORUS  --  3.0 2.6  --   --   --   --          Assessment & Plan   Assessment / Plan     Active Hospital Problems:  Active Hospital Problems    Diagnosis    • **Acute on chronic respiratory failure with hypoxia and hypercapnia (HCC)    • COPD with acute exacerbation (HCC)    • Restrictive lung disease      Impression:   Acute exacerbation of COPD, FEV1 13%  Human metapneumovirus PNA  Non-compliant with medication recommendations  Acute on chronic hypoxic hypercapnic respiratory failure: home trilogy and 4 L   Morbid Obesity: BMI 40.89  Type II Diabetes with hyperglycemia  Anxiety  Contraction alkolosis     Plan:   Continue oxygen therapy  Continue NIPPV  Has completed steroids  Continue bronchodilator therapies and bronchopulmonary hygiene  Out of bed in chair  Continue rehab  Will benefit from rehab placement  oard.  Awaiting rehab.     Has appt in COPD clinic with Hallie on 12/8 at 0900.     DVT prophylaxis:  Medical DVT prophylaxis orders are present.    CODE STATUS:   Code Status (Patient has no pulse and is not breathing): CPR (Attempt to Resuscitate)  Medical Interventions (Patient has pulse or is breathing): Full Support  Release to patient: Routine Release    Stable to discharge to rehab from my perspective    Will sign off at this time please call with questions      Labs, microbiology, radiology, medications, and provider notes personally reviewed.  Discussed with primary services and bedside RN.   Electronically signed by Kamran Marquez DO, 12/03/22, 6:18 PM EST.

## 2022-12-04 LAB
GLUCOSE BLDC GLUCOMTR-MCNC: 121 MG/DL (ref 70–99)
GLUCOSE BLDC GLUCOMTR-MCNC: 134 MG/DL (ref 70–99)
GLUCOSE BLDC GLUCOMTR-MCNC: 151 MG/DL (ref 70–99)

## 2022-12-04 PROCEDURE — 94760 N-INVAS EAR/PLS OXIMETRY 1: CPT

## 2022-12-04 PROCEDURE — 82962 GLUCOSE BLOOD TEST: CPT

## 2022-12-04 PROCEDURE — 97530 THERAPEUTIC ACTIVITIES: CPT

## 2022-12-04 PROCEDURE — 25010000002 ONDANSETRON PER 1 MG: Performed by: INTERNAL MEDICINE

## 2022-12-04 PROCEDURE — 97110 THERAPEUTIC EXERCISES: CPT

## 2022-12-04 PROCEDURE — 94799 UNLISTED PULMONARY SVC/PX: CPT

## 2022-12-04 PROCEDURE — 25010000002 ENOXAPARIN PER 10 MG: Performed by: FAMILY MEDICINE

## 2022-12-04 PROCEDURE — 94761 N-INVAS EAR/PLS OXIMETRY MLT: CPT

## 2022-12-04 PROCEDURE — 63710000001 INSULIN LISPRO (HUMAN) PER 5 UNITS: Performed by: INTERNAL MEDICINE

## 2022-12-04 PROCEDURE — 25010000002 KETOROLAC TROMETHAMINE PER 15 MG: Performed by: HOSPITALIST

## 2022-12-04 PROCEDURE — 99232 SBSQ HOSP IP/OBS MODERATE 35: CPT | Performed by: INTERNAL MEDICINE

## 2022-12-04 RX ORDER — KETOROLAC TROMETHAMINE 15 MG/ML
15 INJECTION, SOLUTION INTRAMUSCULAR; INTRAVENOUS ONCE
Status: COMPLETED | OUTPATIENT
Start: 2022-12-04 | End: 2022-12-04

## 2022-12-04 RX ADMIN — ALPRAZOLAM 0.5 MG: 0.25 TABLET ORAL at 05:35

## 2022-12-04 RX ADMIN — ARFORMOTEROL TARTRATE INHALATION 15 MCG: 15 SOLUTION RESPIRATORY (INHALATION) at 07:56

## 2022-12-04 RX ADMIN — BUDESONIDE 0.25 MG: 0.25 INHALANT ORAL at 18:44

## 2022-12-04 RX ADMIN — IPRATROPIUM BROMIDE AND ALBUTEROL SULFATE 3 ML: 2.5; .5 SOLUTION RESPIRATORY (INHALATION) at 18:43

## 2022-12-04 RX ADMIN — ARFORMOTEROL TARTRATE INHALATION 15 MCG: 15 SOLUTION RESPIRATORY (INHALATION) at 18:44

## 2022-12-04 RX ADMIN — ENOXAPARIN SODIUM 40 MG: 100 INJECTION SUBCUTANEOUS at 08:32

## 2022-12-04 RX ADMIN — INSULIN LISPRO 2 UNITS: 100 INJECTION, SOLUTION INTRAVENOUS; SUBCUTANEOUS at 17:55

## 2022-12-04 RX ADMIN — DIVALPROEX SODIUM 125 MG: 125 CAPSULE, COATED PELLETS ORAL at 19:40

## 2022-12-04 RX ADMIN — KETOROLAC TROMETHAMINE 15 MG: 15 INJECTION, SOLUTION INTRAMUSCULAR; INTRAVENOUS at 20:41

## 2022-12-04 RX ADMIN — IPRATROPIUM BROMIDE AND ALBUTEROL SULFATE 3 ML: 2.5; .5 SOLUTION RESPIRATORY (INHALATION) at 12:29

## 2022-12-04 RX ADMIN — ASPIRIN 81 MG CHEWABLE TABLET 81 MG: 81 TABLET CHEWABLE at 08:32

## 2022-12-04 RX ADMIN — Medication 10 ML: at 08:32

## 2022-12-04 RX ADMIN — BUTALBITAL, ACETAMINOPHEN AND CAFFEINE 1 CAPSULE: 300; 40; 50 CAPSULE ORAL at 16:34

## 2022-12-04 RX ADMIN — ALPRAZOLAM 0.5 MG: 0.25 TABLET ORAL at 19:40

## 2022-12-04 RX ADMIN — ONDANSETRON 4 MG: 2 INJECTION INTRAMUSCULAR; INTRAVENOUS at 08:33

## 2022-12-04 RX ADMIN — LEVOTHYROXINE SODIUM 125 MCG: 125 TABLET ORAL at 05:36

## 2022-12-04 RX ADMIN — IPRATROPIUM BROMIDE AND ALBUTEROL SULFATE 3 ML: 2.5; .5 SOLUTION RESPIRATORY (INHALATION) at 00:40

## 2022-12-04 RX ADMIN — ONDANSETRON 4 MG: 2 INJECTION INTRAMUSCULAR; INTRAVENOUS at 23:02

## 2022-12-04 RX ADMIN — ATENOLOL 50 MG: 50 TABLET ORAL at 08:32

## 2022-12-04 RX ADMIN — SERTRALINE HYDROCHLORIDE 200 MG: 100 TABLET ORAL at 08:32

## 2022-12-04 RX ADMIN — MONTELUKAST 10 MG: 10 TABLET, FILM COATED ORAL at 08:32

## 2022-12-04 RX ADMIN — IPRATROPIUM BROMIDE AND ALBUTEROL SULFATE 3 ML: 2.5; .5 SOLUTION RESPIRATORY (INHALATION) at 07:56

## 2022-12-04 RX ADMIN — ALPRAZOLAM 0.5 MG: 0.25 TABLET ORAL at 12:45

## 2022-12-04 RX ADMIN — BUDESONIDE 0.25 MG: 0.25 INHALANT ORAL at 07:56

## 2022-12-04 RX ADMIN — DIVALPROEX SODIUM 125 MG: 125 CAPSULE, COATED PELLETS ORAL at 08:32

## 2022-12-04 RX ADMIN — SPIRONOLACTONE 25 MG: 25 TABLET ORAL at 08:32

## 2022-12-04 NOTE — PROGRESS NOTES
[facility]   Hospitalist Progress Note  Date: 2022  Patient Name: Nova Manuel  : 1965  MRN: [id number]  Date of admission: 2022  Consultants:   -Pulmonology/Critical Care: Dr. Donte Mcqueen, Dr. Cheryle Silverio, Dr. Kamran Marquez, Dr. Levi Infante, Dr. Kamran Marquez    Subjective   Subjective     Chief Complaint: Shortness of breath    Summary:   Nova Manuel is a 57 y.o. female with severe COPD (FEV1: 13%), chronic hypoxemic and hypercapnic respiratory failure on 2 L O2 via nasal cannula and nocturnal NIPPV who presented to the ED with complaints of worsening shortness of breath.  Upon evaluation in ED patient was in respiratory distress and started on NIPPV with improvement in symptoms.  CXR did not demonstrate acute infiltrate or effusion.  CT chest negative for PE.  Hospitalist service contacted for further evaluation management.  Pulmonology consulted.  Systemic steroids, and inhaled bronchodilators and empiric antibiotics initiated.  Respiratory panel returned positive for human metapneumovirus.  Patient continued on BiPAP and respiratory function with some improvement.  Given patient's COPD severity palliative care was consulted and decision made to continue with aggressive care measures.  Patient unable to tolerate much p.o. intake some core track was placed and nutrition initiated.  Patient's respiratory status has been improving and patient able to be weaned from BiPAP to Airvo and subsequently to nasal cannula and home trilogy at night.    Interval Followup:   No acute events overnight.  Patient states that her shortness of breath that is stable.  She has been using incentive spirometer throughout the day.  She denies any chest pain, abdominal pain, nausea or vomiting.  Nursing with no additional acute issues to report.    Review of Systems   All systems reviewed and negative unless stated otherwise under subjective.    Objective   Objective     Vitals:   Temp:  [97.9 °F (36.6  °C)-98.2 °F (36.8 °C)] 98 °F (36.7 °C)  Heart Rate:  [64-74] 74  Resp:  [16-20] 18  BP: ()/(44-80) 98/58  Flow (L/min):  [2-3] 2  Physical Exam   Gen: No acute distress, conversant, pleasant, sitting up in chair bedside watching TV and using incentive spirometer  HEENT: MMM, Atraumatic  Neck: Supple, Trachea midline  Resp: CTAB, improved bilateral rhonchi, minimal conversational dyspnea noted, normal respiratory effort, equal chest rise bilaterally  Card: RRR, No m/r/g  Abd: Soft, Nontender, Nondistended, + bowel sounds  Ext: No cyanosis, No clubbing  Neuro: CN II-XII grossly intact, No focal deficits appreciated  Psych: AAO x 3, Normal mood, Normal affect    Result Review    Result Review:  I have personally reviewed the results as below and agree with these findings:  []  Laboratory:   CMP    CMP 12/1/22 12/2/22 12/3/22   Glucose 100 (A) 126 (A) 125 (A)   BUN 26 (A) 24 (A) 23 (A)   Creatinine 0.62 0.71 0.72   Sodium 135 (A) 138 137   Potassium 4.6 4.5 4.4   Chloride 88 (A) 91 (A) 92 (A)   Calcium 10.2 11.0 (A) 10.2   (A) Abnormal value       Comments are available for some flowsheets but are not being displayed.           CBC    CBC 12/1/22 12/2/22 12/3/22   WBC 9.60 8.36 7.63   RBC 3.92 4.04 4.02   Hemoglobin 12.4 12.7 12.6   Hematocrit 38.8 38.9 38.3   MCV 99.0 (A) 96.3 95.3   MCH 31.6 31.4 31.3   MCHC 32.0 32.6 32.9   RDW 12.3 12.3 12.0 (A)   Platelets 179 167 188   (A) Abnormal value            []  Microbiology:   []  Radiology:   []  EKG/Telemetry:   []  Cardiology/Vascular:    []  Pathology:  []  Old records:  []  Other:    Assessment & Plan   Assessment / Plan     Assessment:  Acute on chronic hypercarbic and hypoxemic respiratory failure requiring NIPPV  Acute COPD exacerbation  Human metapneumovirus pneumonia  Medical noncompliance  Obesity with BMI: 37.42  Type 2 diabetes mellitus with hyperglycemia  Anxiety  Contraction alkalosis  Hypertension    Plan:  -Pulmonology/Critical Care consulted and  following, appreciate assistance and recommendations in the care of this patient.  -Continue supplemental O2 to maintain sats greater than 90%, wean as tolerated  -Continue home trilogy at night and with naps and as needed throughout the day  -Continue Brovana, Pulmicort and duo nebs  -Continue Singulair  -Continue current insulin regimen.  Blood glucose well controlled.  -PT/OT consulted.  Patient encouraged to increase activity.  Out of bed to chair as tolerated.  I believe that with continued work with skilled therapy services patient's condition will improve and eventually patient would be able to return home.  Pre-CERT pending for rehab placement at this time.  -Continue appropriate home medications  -Labs stable on review, will give patient lab holiday on 12/05/2022 unless there is an acute change in the patient's condition  -Clinical course will dictate further management     DVT Prophylaxis: Lovenox  Diet: Regular  Dispo: Social work making referrals  Code Status: Full code     Personally reviewed patients labs and imaging, discussed with patient and nurse at bedside. Discussed case with the following consultants: Pulmonology/Critical Care.     Part of this note may be an electronic transcription/translation of spoken language to printed text using the Dragon dictation system.    DVT prophylaxis:  Medical DVT prophylaxis orders are present.    CODE STATUS:   Code Status (Patient has no pulse and is not breathing): CPR (Attempt to Resuscitate)  Medical Interventions (Patient has pulse or is breathing): Full Support  Release to patient: Routine Release      Electronically signed by Julio Elder MD, 12/04/22, 2:32 PM EST.

## 2022-12-04 NOTE — THERAPY TREATMENT NOTE
Acute Care - Physical Therapy Treatment Note  [facility]     Patient Name: Nova Manuel  : 1965  MRN: [id number]  Today's Date: 2022      Visit Dx:     ICD-10-CM ICD-9-CM   1. Acute on chronic respiratory failure with hypoxia and hypercapnia (HCC)  J96.21 518.84    J96.22 786.09     799.02   2. COPD with acute exacerbation (HCC)  J44.1 491.21   3. Oropharyngeal dysphagia  R13.12 787.22   4. Decreased activities of daily living (ADL)  Z78.9 V49.89   5. Difficulty walking  R26.2 719.7     Patient Active Problem List   Diagnosis   • Centrilobular emphysema (HCC)   • Chronic respiratory failure with hypoxia and hypercapnia (HCC)   • Acute on chronic respiratory failure with hypoxia and hypercapnia (HCC)   • COPD with acute exacerbation (HCC)   • Restrictive lung disease   • Allergic rhinitis   • Anemia, pernicious   • Anxiety   • Asthma   • Diabetes (HCC)   • Migraine   • Mitral valve prolapse   • Morbid obesity with body mass index of 40.0-44.9 in adult (HCC)     Past Medical History:   Diagnosis Date   • Anxiety    • Asthma    • CHF (congestive heart failure) (HCC)    • COPD (chronic obstructive pulmonary disease) (HCC)    • Diabetes mellitus (HCC)    • Disease of thyroid gland    • Hyperlipidemia    • Hypertension      Past Surgical History:   Procedure Laterality Date   •  SECTION     • CHOLECYSTECTOMY     • HYSTERECTOMY       PT Assessment (last 12 hours)     PT Evaluation and Treatment     Row Name 22          Physical Therapy Time and Intention    Subjective Information complains of;weakness;fatigue  -DK     Document Type therapy note (daily note)  -DK     Mode of Treatment individual therapy;physical therapy  -DK     Patient Effort good  -DK     Symptoms Noted During/After Treatment fatigue  -DK     Row Name 22          Pain    Pretreatment Pain Rating 0/10 - no pain  -DK     Posttreatment Pain Rating 0/10 - no pain  -DK     Row Name 22          Cognition     Affect/Mental Status (Cognition) WFL  -DK     Orientation Status (Cognition) oriented x 4  -DK     Follows Commands (Cognition) WFL  -DK     Cognitive Function WFL  -DK     Personal Safety Interventions gait belt;nonskid shoes/slippers when out of bed;supervised activity  -DK     Row Name 12/04/22 0935          Bed Mobility    Bed Mobility supine-sit  -DK     Supine-Sit [place] (Bed Mobility) standby assist  -DK     Assistive Device (Bed Mobility) bed rails  -DK     Row Name 12/04/22 0935          Transfers    Transfers sit-stand transfer;stand-sit transfer  -DK     Row Name 12/04/22 0935          Bed-Chair Transfer    Bed-Chair [place] (Transfers) standby assist;contact guard;1 person assist  -DK     Assistive Device (Bed-Chair Transfers) --  no assistive device  -     Row Name 12/04/22 0935          Sit-Stand Transfer    Sit-Stand [place] (Transfers) standby assist;contact guard;1 person assist  -DK     Assistive Device (Sit-Stand Transfers) --  no assistive device  -     Row Name 12/04/22 0935          Stand-Sit Transfer    Stand-Sit [place] (Transfers) standby assist;contact guard;1 person assist  -DK     Assistive Device (Stand-Sit Transfers) --  no assistive device  -     Row Name 12/04/22 0935          Gait/Stairs (Locomotion)    Gait/Stairs Locomotion gait/ambulation independence;gait/ambulation assistive device;distance ambulated;gait pattern  -DK     [place] Level (Gait) standby assist;contact guard;1 person assist  -DK     Assistive Device (Gait) --  no assistive device  -DK     Distance in Feet (Gait) 4  -DK     Pattern (Gait) step-through  -DK     Deviations/Abnormal Patterns (Gait) festinating/shuffling;stride length decreased  -DK     Bilateral Gait Deviations forward flexed posture  -DK     Comment, (Gait/Stairs) Pt stood and ambulated bed to chair with assist x 1, no walker, with O2.  She was left in the recliner on alert post treatment.  -     Row Name 12/04/22 0935           Safety Issues, Functional Mobility    Safety Issues Affecting Function (Mobility) awareness of need for assistance;judgment;safety precaution awareness  -     Impairments Affecting Function (Mobility) balance;endurance/activity tolerance;shortness of breath;strength  -     Row Name 12/04/22 0935          Balance    Balance Assessment sitting static balance;sitting dynamic balance;standing static balance;standing dynamic balance  -DK     Static Sitting Balance standby assist  -DK     Dynamic Sitting Balance standby assist  -DK     Position, Sitting Balance unsupported;sitting edge of bed;sitting in chair  -     Static Standing Balance standby assist;contact guard;1-person assist  -DK     Dynamic Standing Balance standby assist;contact guard;1-person assist  -DK     Position/Device Used, Standing Balance --  no assistive device  -     Balance Interventions standing;dynamic;tandem gait  -     Row Name 12/04/22 0935          Motor Skills    Motor Skills --  therapeutic exercises  -     Coordination WFL  -     Therapeutic Exercise hip;knee;ankle  -     Row Name 12/04/22 0935          Hip (Therapeutic Exercise)    Hip (Therapeutic Exercise) AAROM (active assistive range of motion)  -     Hip AAROM (Therapeutic Exercise) bilateral;flexion;extension;aBduction;aDduction;supine;10 repetitions;2 sets  -     Row Name 12/04/22 0935          Knee (Therapeutic Exercise)    Knee (Therapeutic Exercise) AAROM (active assistive range of motion)  -     Knee AAROM (Therapeutic Exercise) bilateral;flexion;extension;supine;10 repetitions;2 sets  -     Row Name 12/04/22 0935          Ankle (Therapeutic Exercise)    Ankle (Therapeutic Exercise) AAROM (active assistive range of motion)  -     Ankle AAROM (Therapeutic Exercise) bilateral;dorsiflexion;plantarflexion;supine;10 repetitions;2 sets  -     Row Name 12/04/22 0935          Plan of Care Review    Plan of Care Reviewed With patient  -CHIO     Progress  improving  -DK     Row Name 12/04/22 0935          Positioning and Restraints    Pre-Treatment Position in bed  -DK     Post Treatment Position chair  -DK     In Bed supine;call light within reach;encouraged to call for assist;exit alarm on;side rails up x2;legs elevated;heels elevated  -DK     In Chair call light within reach;encouraged to call for assist;exit alarm on;sitting  -DK     Row Name 12/04/22 0935          Therapy Assessment/Plan (PT)    Rehab Potential (PT) good, to achieve stated therapy goals  -DK     Criteria for Skilled Interventions Met (PT) skilled treatment is necessary  -DK     Therapy Frequency (PT) daily  -DK     Problem List (PT) problems related to;balance;mobility;strength  -DK     Activity Limitations Related to Problem List (PT) unable to ambulate safely;unable to transfer safely  -DK     Row Name 12/04/22 0935          Progress Summary (PT)    Progress Toward Functional Goals (PT) progress toward functional goals is good  -DK           User Key  (r) = Recorded By, (t) = Taken By, (c) = Cosigned By    Initials Name Provider Type    Brook Greene PTA Physical Therapist Assistant                Physical Therapy Education     Title: PT OT SLP Therapies (Done)     Topic: Physical Therapy (Done)     Point: Mobility training (Done)     Learning Progress Summary           Patient Acceptance, E,TB, VU,NR by EV at 12/4/2022 0018    Acceptance, E, VU,DU by RF at 12/2/2022 0838    Acceptance, E,TB, VU by AD at 11/29/2022 1311                   Point: Home exercise program (Done)     Learning Progress Summary           Patient Acceptance, E,TB, VU,NR by EV at 12/4/2022 0018    Acceptance, E, VU,DU by RF at 12/2/2022 0838    Acceptance, E,TB, VU by JR at 11/30/2022 1433                   Point: Body mechanics (Done)     Learning Progress Summary           Patient Acceptance, E,TB, VU,NR by EV at 12/4/2022 0018    Acceptance, E, VU,DU by RF at 12/2/2022 0838    Acceptance, E,TB, VU by AD at  11/29/2022 1311                   Point: Precautions (Done)     Learning Progress Summary           Patient Acceptance, E,TB, VU,NR by EV at 12/4/2022 0018    Acceptance, E, VU,DU by RF at 12/2/2022 0838    Acceptance, E,TB, VU by AD at 11/29/2022 1311                               User Key     Initials Effective Dates Name Provider Type Discipline    EV 06/16/21 -  Shell Landon, RN Registered Nurse Nurse    JR 03/14/22 -  Margarita Lebron, RN Registered Nurse Nurse    RF 01/19/22 -  Kalyan Smalls, RN Registered Nurse Nurse    AD 10/18/22 -  Jcarlos Min, PT Student PT Student PT              PT Recommendation and Plan  Planned Therapy Interventions (PT): balance training, bed mobility training, gait training, strengthening, transfer training  Therapy Frequency (PT): daily  Progress Summary (PT)  Progress Toward Functional Goals (PT): progress toward functional goals is good  Plan of Care Reviewed With: patient  Progress: improving   Outcome Measures     Row Name 12/04/22 0935             How much help from another person do you currently need...    Turning from your back to your side while in flat bed without using bedrails? 4  -DK      Moving from lying on back to sitting on the side of a flat bed without bedrails? 4  -DK      Moving to and from a bed to a chair (including a wheelchair)? 3  -DK      Standing up from a chair using your arms (e.g., wheelchair, bedside chair)? 3  -DK      Climbing 3-5 steps with a railing? 2  -DK      To walk in hospital room? 3  -DK      AM-PAC 6 Clicks Score (PT) 19  -DK         Functional Assessment    Outcome Measure Options AM-PAC 6 Clicks Basic Mobility (PT)  -DK            User Key  (r) = Recorded By, (t) = Taken By, (c) = Cosigned By    Initials Name Provider Type    Brook Greene PTA Physical Therapist Assistant                 Time Calculation:    PT Charges     Row Name 12/04/22 0422             Time Calculation    PT Received On 12/04/22  -DK      PT Goal  Re-Cert Due Date 12/08/22  -DK         Timed Charges    00663 - PT Therapeutic Exercise Minutes 14  -DK      45323 - Gait Training Minutes  2  -DK      07771 - PT Therapeutic Activity Minutes 8  -DK         Total Minutes    Timed Charges Total Minutes 24  -DK       Total Minutes 24  -DK            User Key  (r) = Recorded By, (t) = Taken By, (c) = Cosigned By    Initials Name Provider Type    Brook Greene PTA Physical Therapist Assistant              Therapy Charges for Today     Code Description Service Date Service Provider Modifiers Qty    20079384196 HC PT THER PROC EA 15 MIN 12/4/2022 Brook Guerra PTA GP 1    59583117066 HC PT THERAPEUTIC ACT EA 15 MIN 12/4/2022 Brook Guerra PTA GP 1          PT G-Codes  Outcome Measure Options: AM-PAC 6 Clicks Basic Mobility (PT)  AM-PAC 6 Clicks Score (PT): 19  AM-PAC 6 Clicks Score (OT): 17    Brook Guerra PTA  12/4/2022

## 2022-12-04 NOTE — PLAN OF CARE
Goal Outcome Evaluation:           Progress: no change  Outcome Evaluation: alert and oriented x 4. vitals stable throughout shift. pt on 3L nasal cannula and tolerating well. blood glucose monitored. pt up in the chair some today. medicated for anxiety and nausea per orders. pt up to the bedside commode with x 1 assistance today. no other complaints at this time.

## 2022-12-05 LAB
GLUCOSE BLDC GLUCOMTR-MCNC: 113 MG/DL (ref 70–99)
GLUCOSE BLDC GLUCOMTR-MCNC: 118 MG/DL (ref 70–99)
GLUCOSE BLDC GLUCOMTR-MCNC: 178 MG/DL (ref 70–99)

## 2022-12-05 PROCEDURE — 97535 SELF CARE MNGMENT TRAINING: CPT

## 2022-12-05 PROCEDURE — 82962 GLUCOSE BLOOD TEST: CPT

## 2022-12-05 PROCEDURE — 97530 THERAPEUTIC ACTIVITIES: CPT

## 2022-12-05 PROCEDURE — 94761 N-INVAS EAR/PLS OXIMETRY MLT: CPT

## 2022-12-05 PROCEDURE — 94664 DEMO&/EVAL PT USE INHALER: CPT

## 2022-12-05 PROCEDURE — 94799 UNLISTED PULMONARY SVC/PX: CPT

## 2022-12-05 PROCEDURE — 25010000002 ENOXAPARIN PER 10 MG: Performed by: FAMILY MEDICINE

## 2022-12-05 PROCEDURE — 99232 SBSQ HOSP IP/OBS MODERATE 35: CPT | Performed by: INTERNAL MEDICINE

## 2022-12-05 PROCEDURE — 97110 THERAPEUTIC EXERCISES: CPT

## 2022-12-05 PROCEDURE — 63710000001 INSULIN LISPRO (HUMAN) PER 5 UNITS: Performed by: INTERNAL MEDICINE

## 2022-12-05 RX ADMIN — SERTRALINE HYDROCHLORIDE 200 MG: 100 TABLET ORAL at 09:07

## 2022-12-05 RX ADMIN — INSULIN LISPRO 2 UNITS: 100 INJECTION, SOLUTION INTRAVENOUS; SUBCUTANEOUS at 09:16

## 2022-12-05 RX ADMIN — ALPRAZOLAM 0.5 MG: 0.25 TABLET ORAL at 09:07

## 2022-12-05 RX ADMIN — LEVOTHYROXINE SODIUM 125 MCG: 125 TABLET ORAL at 05:38

## 2022-12-05 RX ADMIN — ATENOLOL 50 MG: 50 TABLET ORAL at 09:07

## 2022-12-05 RX ADMIN — ALPRAZOLAM 0.5 MG: 0.25 TABLET ORAL at 15:21

## 2022-12-05 RX ADMIN — BUTALBITAL, ACETAMINOPHEN AND CAFFEINE 1 CAPSULE: 300; 40; 50 CAPSULE ORAL at 02:58

## 2022-12-05 RX ADMIN — BUDESONIDE 0.25 MG: 0.25 INHALANT ORAL at 07:28

## 2022-12-05 RX ADMIN — MONTELUKAST 10 MG: 10 TABLET, FILM COATED ORAL at 09:07

## 2022-12-05 RX ADMIN — ALPRAZOLAM 0.5 MG: 0.25 TABLET ORAL at 02:58

## 2022-12-05 RX ADMIN — ARFORMOTEROL TARTRATE INHALATION 15 MCG: 15 SOLUTION RESPIRATORY (INHALATION) at 19:31

## 2022-12-05 RX ADMIN — DIVALPROEX SODIUM 125 MG: 125 CAPSULE, COATED PELLETS ORAL at 21:16

## 2022-12-05 RX ADMIN — IPRATROPIUM BROMIDE AND ALBUTEROL SULFATE 3 ML: 2.5; .5 SOLUTION RESPIRATORY (INHALATION) at 19:30

## 2022-12-05 RX ADMIN — IPRATROPIUM BROMIDE AND ALBUTEROL SULFATE 3 ML: 2.5; .5 SOLUTION RESPIRATORY (INHALATION) at 12:16

## 2022-12-05 RX ADMIN — ALPRAZOLAM 0.5 MG: 0.25 TABLET ORAL at 21:16

## 2022-12-05 RX ADMIN — SPIRONOLACTONE 25 MG: 25 TABLET ORAL at 09:07

## 2022-12-05 RX ADMIN — ARFORMOTEROL TARTRATE INHALATION 15 MCG: 15 SOLUTION RESPIRATORY (INHALATION) at 07:27

## 2022-12-05 RX ADMIN — DIVALPROEX SODIUM 125 MG: 125 CAPSULE, COATED PELLETS ORAL at 09:07

## 2022-12-05 RX ADMIN — BUDESONIDE 0.25 MG: 0.25 INHALANT ORAL at 19:31

## 2022-12-05 RX ADMIN — ENOXAPARIN SODIUM 40 MG: 100 INJECTION SUBCUTANEOUS at 09:07

## 2022-12-05 RX ADMIN — ASPIRIN 81 MG CHEWABLE TABLET 81 MG: 81 TABLET CHEWABLE at 09:07

## 2022-12-05 RX ADMIN — IPRATROPIUM BROMIDE AND ALBUTEROL SULFATE 3 ML: 2.5; .5 SOLUTION RESPIRATORY (INHALATION) at 07:27

## 2022-12-05 RX ADMIN — Medication 10 ML: at 09:07

## 2022-12-05 NOTE — THERAPY TREATMENT NOTE
Patient Name: Nova Manuel  : 1965    MRN: [id number]                              Today's Date: 2022       Admit Date: 2022    Visit Dx:     ICD-10-CM ICD-9-CM   1. Acute on chronic respiratory failure with hypoxia and hypercapnia (HCC)  J96.21 518.84    J96.22 786.09     799.02   2. COPD with acute exacerbation (HCC)  J44.1 491.21   3. Oropharyngeal dysphagia  R13.12 787.22   4. Decreased activities of daily living (ADL)  Z78.9 V49.89   5. Difficulty walking  R26.2 719.7     Patient Active Problem List   Diagnosis   • Centrilobular emphysema (HCC)   • Chronic respiratory failure with hypoxia and hypercapnia (HCC)   • Acute on chronic respiratory failure with hypoxia and hypercapnia (HCC)   • COPD with acute exacerbation (HCC)   • Restrictive lung disease   • Allergic rhinitis   • Anemia, pernicious   • Anxiety   • Asthma   • Diabetes (HCC)   • Migraine   • Mitral valve prolapse   • Morbid obesity with body mass index of 40.0-44.9 in adult (HCC)     Past Medical History:   Diagnosis Date   • Anxiety    • Asthma    • CHF (congestive heart failure) (HCC)    • COPD (chronic obstructive pulmonary disease) (HCC)    • Diabetes mellitus (HCC)    • Disease of thyroid gland    • Hyperlipidemia    • Hypertension      Past Surgical History:   Procedure Laterality Date   •  SECTION     • CHOLECYSTECTOMY     • HYSTERECTOMY        General Information     Row Name 22          OT Time and Intention    Document Type therapy note (daily note)  -EG     Mode of Treatment individual therapy;occupational therapy  -EG     Row Name 22          General Information    Existing Precautions/Restrictions oxygen therapy device and L/min  -EG     Row Name 22          Cognition    Orientation Status (Cognition) oriented x 4  -EG     Row Name 22          Safety Issues, Functional Mobility    Impairments Affecting Function (Mobility) balance;endurance/activity  tolerance;shortness of breath;strength  -EG           User Key  (r) = Recorded By, (t) = Taken By, (c) = Cosigned By    Initials Name Provider Type    EG Tamra Goodwin OT Occupational Therapist                 Mobility/ADL's     Row Name 12/05/22 0809          Bed Mobility    Bed Mobility supine-sit  -EG     Supine-Sit [place] (Bed Mobility) standby assist  -EG     Bed Mobility, Safety Issues decreased use of arms for pushing/pulling;decreased use of legs for bridging/pushing  -EG     Assistive Device (Bed Mobility) bed rails  -EG     Comment, (Bed Mobility) Requires extended time and prolonged rest breaks after performance due to SOB, O2 sats dropping to 83% on 3L via NC continuos  -EG     Row Name 12/05/22 0809          Transfers    Transfers sit-stand transfer;stand-sit transfer;toilet transfer;bed-chair transfer  -EG     Comment, (Transfers) CGA for all transfers without use of AD; prolonged time and extended rest breaks to perform all tasks with O2 sats continuing to drop with active OOB participation; dropping as low as 79% with nursing present and requesting to bump patient up to 4L for functional task performance.  -EG     Row Name 12/05/22 0809          Bed-Chair Transfer    Bed-Chair [place] (Transfers) contact guard;1 person assist  -EG     Comment, (Bed-Chair Transfer) no AD with cueing on hand placement safety and PLB techniques for sat level maitnence  -EG     Row Name 12/05/22 0809          Sit-Stand Transfer    Sit-Stand [place] (Transfers) standby assist;contact guard;1 person assist  -EG     Row Name 12/05/22 0809          Stand-Sit Transfer    Stand-Sit [place] (Transfers) standby assist;contact guard;1 person assist  -EG     Row Name 12/05/22 0809          Toilet Transfer    Type (Toilet Transfer) stand pivot/stand step  -EG     [place] Level (Toilet Transfer) verbal cues;contact guard  -EG     Assistive Device (Toilet Transfer) commode, bedside without drop arms  -EG      Row Name 12/05/22 08          Activities of Daily Living    BADL Assessment/Intervention toileting  -EG     Row Name 12/05/22 08          Toileting Assessment/Training    [place] Level (Toileting) adjust/manage clothing;toileting skills;perform perineal hygiene  -EG     Comment, (Toileting) modA for toileting with use of BSC; requires assist for rylee care and clothing mangement due to poor task tolerance and standing balance  -EG           User Key  (r) = Recorded By, (t) = Taken By, (c) = Cosigned By    Initials Name Provider Type    EG Tamra Goodwin OT Occupational Therapist               Obj/Interventions     Row Name 12/05/22 08          Motor Skills    Motor Skills coordination;functional endurance  -EG     Coordination WFL  -EG     Functional Endurance fair- endurance to perform ADL's and OOB activitites; nursing is notified of O2 sats dropping with functional task performance and present for some of session due to concerns  -EG     Row Name 12/05/22 0812          Balance    Balance Interventions sitting;standing;sit to stand;supported;occupation based/functional task  -EG           User Key  (r) = Recorded By, (t) = Taken By, (c) = Cosigned By    Initials Name Provider Type    Tamra Godinez, SHIRA Occupational Therapist               Goals/Plan    No documentation.                Clinical Impression     Row Name 12/05/22 0812          Pain Assessment    Pretreatment Pain Rating 0/10 - no pain  -EG     Posttreatment Pain Rating 0/10 - no pain  -EG     Row Name 12/05/22 0812          Plan of Care Review    Plan of Care Reviewed With patient  -EG     Progress improving  -EG     Outcome Evaluation A&Ox4; patient is motivated to participate and demonstrates good carryover on education provided for energy conservation and PLB technique use; Patient is highly limited due to SOB with poor functional endurance, impaired standing balance/tolerance and decreased funcitonal strength requiring need  for continued skilled services to facilitate a return to PLOF with ADL's. Functional gains have been noted at this time during treatments.  -EG     Row Name 12/05/22 0812          Therapy Assessment/Plan (OT)    Rehab Potential (OT) good, to achieve stated therapy goals  -EG     Criteria for Skilled Therapeutic Interventions Met (OT) yes;meets criteria;skilled treatment is necessary  -EG     Therapy Frequency (OT) 5 times/wk  -EG     Row Name 12/05/22 0812          Therapy Plan Review/Discharge Plan (OT)    Anticipated Discharge Disposition (OT) skilled nursing facility;inpatient rehabilitation facility  -EG     Row Name 12/05/22 0812          Vital Signs    Pre SpO2 (%) 93  continuos via NC  -EG     O2 Delivery Pre Treatment nasal cannula  3L  -EG     Intra SpO2 (%) 79  -EG     O2 Delivery Intra Treatment nasal cannula  4L  -EG     Post SpO2 (%) 91  -EG     O2 Delivery Post Treatment nasal cannula  4L  -EG           User Key  (r) = Recorded By, (t) = Taken By, (c) = Cosigned By    Initials Name Provider Type    EG Tamra Goodwin, OT Occupational Therapist               Outcome Measures     Row Name 12/05/22 0815          How much help from another is currently needed...    Putting on and taking off regular lower body clothing? 3  -EG     Bathing (including washing, rinsing, and drying) 3  -EG     Toileting (which includes using toilet bed pan or urinal) 3  -EG     Putting on and taking off regular upper body clothing 3  -EG     Taking care of personal grooming (such as brushing teeth) 3  -EG     Eating meals 4  -EG     AM-PAC 6 Clicks Score (OT) 19  -EG     Row Name 12/05/22 0143          How much help from another person do you currently need...    Turning from your back to your side while in flat bed without using bedrails? 4  -KL     Moving from lying on back to sitting on the side of a flat bed without bedrails? 4  -KL     Moving to and from a bed to a chair (including a wheelchair)? 3  -KL     Standing up  from a chair using your arms (e.g., wheelchair, bedside chair)? 3  -KL     Climbing 3-5 steps with a railing? 2  -KL     To walk in hospital room? 3  -KL     AM-PAC 6 Clicks Score (PT) 19  -KL     Highest level of mobility 6 --> Walked 10 steps or more  -KL     Row Name 12/05/22 0815          Functional Assessment    Outcome Measure Options AM-PAC 6 Clicks Daily Activity (OT);Optimal Instrument  -EG     Row Name 12/05/22 0815          Optimal Instrument    Optimal Instrument Optimal - 3  -EG     Bending/Stooping 3  -EG     Standing 2  -EG     Reaching 2  -EG           User Key  (r) = Recorded By, (t) = Taken By, (c) = Cosigned By    Initials Name Provider Type    Kim Vick, RN Registered Nurse    Tamra Godinez OT Occupational Therapist                Occupational Therapy Education     Title: PT OT SLP Therapies (Done)     Topic: Occupational Therapy (Done)     Point: ADL training (Done)     Description:   Instruct learner(s) on proper safety adaptation and remediation techniques during self care or transfers.   Instruct in proper use of assistive devices.              Learning Progress Summary           Patient Acceptance, E,TB, VU,NR by EV at 12/4/2022 0018    Acceptance, E, VU,DU by RF at 12/2/2022 0838    Acceptance, E,TB, VU by LF at 11/29/2022 1123                   Point: Precautions (Done)     Description:   Instruct learner(s) on prescribed precautions during self-care and functional transfers.              Learning Progress Summary           Patient Acceptance, E,TB, VU,NR by EV at 12/4/2022 0018    Acceptance, E, VU,DU by RF at 12/2/2022 0838    Acceptance, E,TB, VU by LF at 11/29/2022 1123                   Point: Body mechanics (Done)     Description:   Instruct learner(s) on proper positioning and spine alignment during self-care, functional mobility activities and/or exercises.              Learning Progress Summary           Patient Acceptance, E,TB, VU,NR by EV at 12/4/2022 0018     Acceptance, E, VU,DU by RF at 12/2/2022 0838    Acceptance, E,TB, VU by  at 11/29/2022 1123                               User Key     Initials Effective Dates Name Provider Type Discipline    EV 06/16/21 -  Shell Landon, RN Registered Nurse Nurse    LF 06/16/21 -  Altagracia Mcneil OT Occupational Therapist OT     01/19/22 -  Kalyan Smalls, RN Registered Nurse Nurse              OT Recommendation and Plan  Therapy Frequency (OT): 5 times/wk  Plan of Care Review  Plan of Care Reviewed With: patient  Progress: improving  Outcome Evaluation: A&Ox4; patient is motivated to participate and demonstrates good carryover on education provided for energy conservation and PLB technique use; Patient is highly limited due to SOB with poor functional endurance, impaired standing balance/tolerance and decreased funcitonal strength requiring need for continued skilled services to facilitate a return to PLOF with ADL's. Functional gains have been noted at this time during treatments.     Time Calculation:    Time Calculation- OT     Row Name 12/05/22 0816             Time Calculation- OT    OT Received On 12/05/22  -EG      OT Goal Re-Cert Due Date 12/08/22  -EG         Timed Charges    18348 - OT Therapeutic Activity Minutes 12  -EG      11335 - OT Self Care/Mgmt Minutes 16  -EG         Total Minutes    Timed Charges Total Minutes 28  -EG       Total Minutes 28  -EG            User Key  (r) = Recorded By, (t) = Taken By, (c) = Cosigned By    Initials Name Provider Type    EG Tamra Goodwin, OT Occupational Therapist              Therapy Charges for Today     Code Description Service Date Service Provider Modifiers Qty    51805360356 HC OT THERAPEUTIC ACT EA 15 MIN 12/5/2022 Tamra Goodwin OT GO 1    44046948390 HC OT SELF CARE/MGMT/TRAIN EA 15 MIN 12/5/2022 Tamra Goodwin, OT GO 1               Tamra Goodwin OT  12/5/2022

## 2022-12-05 NOTE — PROGRESS NOTES
[facility]   Hospitalist Progress Note  Date: 2022  Patient Name: Nova Manuel  : 1965  MRN: [id number]  Date of admission: 2022  Consultants:   -Pulmonology/Critical Care: Dr. Donte Mcqueen, Dr. Cheryle Silverio, Dr. Kamran Marquez, Dr. Levi Infante, Dr. Kamran Marquez    Subjective   Subjective     Chief Complaint: Shortness of breath    Summary:   Nova Manuel is a 57 y.o. female with severe COPD (FEV1: 13%), chronic hypoxemic and hypercapnic respiratory failure on 2 L O2 via nasal cannula and nocturnal NIPPV who presented to the ED with complaints of worsening shortness of breath.  Upon evaluation in ED patient was in respiratory distress and started on NIPPV with improvement in symptoms.  CXR did not demonstrate acute infiltrate or effusion.  CT chest negative for PE.  Hospitalist service contacted for further evaluation management.  Pulmonology consulted.  Systemic steroids, and inhaled bronchodilators and empiric antibiotics initiated.  Respiratory panel returned positive for human metapneumovirus.  Patient continued on BiPAP and respiratory function with some improvement.  Given patient's COPD severity palliative care was consulted and decision made to continue with aggressive care measures.  Patient unable to tolerate much p.o. intake some core track was placed and nutrition initiated.  Patient's respiratory status has been improving and patient able to be weaned from BiPAP to Airvo and subsequently to nasal cannula and home trilogy at night.  Rehab referrals made by social work, pre-CERT pending at [facility].    Interval Followup:   No acute events overnight.  Patient states that her breathing has been stable.  She continues to use incentive spirometer throughout the day.  She is very pleased with her progress.  She denies any chest pain, abdominal pain, nausea or vomiting.  Patient still pending for [facility] at this time.  Nursing with no additional acute issues to report.    Review  of Systems   All systems reviewed and negative unless stated otherwise under subjective.    Objective   Objective     Vitals:   Temp:  [97.8 °F (36.6 °C)-99.1 °F (37.3 °C)] 99.1 °F (37.3 °C)  Heart Rate:  [65-88] 65  Resp:  [18-20] 18  BP: (104-118)/(52-64) 108/64  Flow (L/min):  [2-4] 4  Physical Exam   Gen: No acute distress, conversant, pleasant, sitting up in chair bedside  HEENT: MMM, Atraumatic  Neck: Supple, Trachea midline  Resp: CTAB, improved bilateral rhonchi, no increase in work of breathing, equal chest rise bilaterally, no conversational dyspnea noted  Card: RRR, No m/r/g  Abd: Soft, Nontender, Nondistended, + bowel sounds  Ext: No cyanosis, No clubbing  Neuro: CN II-XII grossly intact, No focal deficits appreciated  Psych: AAO x 3, Normal mood, Normal affect    Result Review    Result Review:  I have personally reviewed the results as below and agree with these findings:  []  Laboratory:   CMP    CMP 12/1/22 12/2/22 12/3/22   Glucose 100 (A) 126 (A) 125 (A)   BUN 26 (A) 24 (A) 23 (A)   Creatinine 0.62 0.71 0.72   Sodium 135 (A) 138 137   Potassium 4.6 4.5 4.4   Chloride 88 (A) 91 (A) 92 (A)   Calcium 10.2 11.0 (A) 10.2   (A) Abnormal value       Comments are available for some flowsheets but are not being displayed.           CBC    CBC 12/1/22 12/2/22 12/3/22   WBC 9.60 8.36 7.63   RBC 3.92 4.04 4.02   Hemoglobin 12.4 12.7 12.6   Hematocrit 38.8 38.9 38.3   MCV 99.0 (A) 96.3 95.3   MCH 31.6 31.4 31.3   MCHC 32.0 32.6 32.9   RDW 12.3 12.3 12.0 (A)   Platelets 179 167 188   (A) Abnormal value            []  Microbiology:   []  Radiology:   []  EKG/Telemetry:   []  Cardiology/Vascular:    []  Pathology:  []  Old records:  []  Other:    Assessment & Plan   Assessment / Plan     Assessment:  Acute on chronic hypercarbic and hypoxemic respiratory failure requiring NIPPV  Acute COPD exacerbation  Human metapneumovirus pneumonia  Medical noncompliance  Obesity with BMI: 37.42  Type 2 diabetes mellitus with  hyperglycemia  Anxiety  Contraction alkalosis  Hypertension    Plan:  -Pulmonology/Critical Care consulted and following, appreciate assistance and recommendations in the care of this patient.  -Continue supplemental O2 to maintain sats greater than 90%, wean as tolerated  -Continue home trilogy at night and with naps and as needed throughout the day  -Continue Brovana, Pulmicort and duo nebs  -Continue Singulair  -Encouraged patient to continue using incentive spirometer  -No change insulin regimen.  Continue to monitor blood glucose level and SSI requirement and titrate accordingly.  -PT/OT consulted.  Patient encouraged to increase activity.  Out of bed to chair as tolerated.  I believe that with continued work with skilled therapy services patient's condition will improve and eventually patient would be able to return home.  Pre-CERT pending for rehab placement at this time.  -Continue appropriate home medications  -Labs stable on review, will give patient lab holiday on 12/06/2022 unless there is an acute change in the patient's condition  -Clinical course will dictate further management     DVT Prophylaxis: Lovenox  Diet: Regular  Dispo: Social work making referrals  Code Status: Full code     Personally reviewed patients labs and imaging, discussed with patient and nurse at bedside. Discussed case with the following consultants: Pulmonology/Critical Care.     Part of this note may be an electronic transcription/translation of spoken language to printed text using the Dragon dictation system.    DVT prophylaxis:  Medical DVT prophylaxis orders are present.    CODE STATUS:   Code Status (Patient has no pulse and is not breathing): CPR (Attempt to Resuscitate)  Medical Interventions (Patient has pulse or is breathing): Full Support  Release to patient: Routine Release      Electronically signed by Julio Elder MD, 12/05/22, 1:10 PM EST.

## 2022-12-05 NOTE — PLAN OF CARE
Goal Outcome Evaluation:           Progress: no change  Outcome Evaluation: alert and oriented x 4. vitals stable throughout shift. medicated for anxiety, nausea, and headache per orders this shift. pt up in the chair most of the day. encouraged pt to use incentive spirometer. blood glucose monitored. no other complaints at this time.

## 2022-12-05 NOTE — THERAPY TREATMENT NOTE
Acute Care - Physical Therapy Treatment Note  [facility]     Patient Name: Nova Manuel  : 1965  MRN: [id number]  Today's Date: 2022      Visit Dx:     ICD-10-CM ICD-9-CM   1. Acute on chronic respiratory failure with hypoxia and hypercapnia (HCC)  J96.21 518.84    J96.22 786.09     799.02   2. COPD with acute exacerbation (HCC)  J44.1 491.21   3. Oropharyngeal dysphagia  R13.12 787.22   4. Decreased activities of daily living (ADL)  Z78.9 V49.89   5. Difficulty walking  R26.2 719.7     Patient Active Problem List   Diagnosis   • Centrilobular emphysema (HCC)   • Chronic respiratory failure with hypoxia and hypercapnia (HCC)   • Acute on chronic respiratory failure with hypoxia and hypercapnia (HCC)   • COPD with acute exacerbation (HCC)   • Restrictive lung disease   • Allergic rhinitis   • Anemia, pernicious   • Anxiety   • Asthma   • Diabetes (HCC)   • Migraine   • Mitral valve prolapse   • Morbid obesity with body mass index of 40.0-44.9 in adult (HCC)     Past Medical History:   Diagnosis Date   • Anxiety    • Asthma    • CHF (congestive heart failure) (HCC)    • COPD (chronic obstructive pulmonary disease) (HCC)    • Diabetes mellitus (HCC)    • Disease of thyroid gland    • Hyperlipidemia    • Hypertension      Past Surgical History:   Procedure Laterality Date   •  SECTION     • CHOLECYSTECTOMY     • HYSTERECTOMY       PT Assessment (last 12 hours)     PT Evaluation and Treatment     Row Name 22 1212          Physical Therapy Time and Intention    Subjective Information complains of;weakness;fatigue  -DK     Document Type therapy note (daily note)  -DK     Mode of Treatment individual therapy;physical therapy  -DK     Patient Effort good  -DK     Symptoms Noted During/After Treatment fatigue;increased pain  -DK     Comment Pt was finishing on the BSC upon entry to the room.  -DK     Row Name 22 1212          Pain    Pretreatment Pain Rating 0/10 - no pain  -DK      Posttreatment Pain Rating 0/10 - no pain  -     Row Name 12/05/22 1212          Cognition    Affect/Mental Status (Cognition) WFL  -DK     Orientation Status (Cognition) oriented x 4  -DK     Follows Commands (Cognition) WFL  -     Cognitive Function WFL  -DK     Personal Safety Interventions gait belt;nonskid shoes/slippers when out of bed;supervised activity  -     Row Name 12/05/22 1212          Bed Mobility    Bed Mobility supine-sit  -DK     Supine-Sit [place] (Bed Mobility) standby assist  -     Assistive Device (Bed Mobility) bed rails  -     Row Name 12/05/22 1212          Transfers    Transfers sit-stand transfer;stand-sit transfer  -     Row Name 12/05/22 1212          Bed-Chair Transfer    Bed-Chair [place] (Transfers) standby assist;contact guard;1 person assist  -     Assistive Device (Bed-Chair Transfers) --  no assistive device  -     Row Name 12/05/22 1212          Sit-Stand Transfer    Sit-Stand [place] (Transfers) standby assist;contact guard;1 person assist  -     Assistive Device (Sit-Stand Transfers) --  no assistive device  -     Row Name 12/05/22 1212          Stand-Sit Transfer    Stand-Sit [place] (Transfers) standby assist;contact guard;1 person assist  -     Assistive Device (Stand-Sit Transfers) --  no assistive device  -     Row Name 12/05/22 1212          Toilet Transfer    Type (Toilet Transfer) sit-stand;stand-sit  -     [place] Level (Toilet Transfer) standby assist;contact guard;1 person assist  -     Assistive Device (Toilet Transfer) commode, bedside without drop arms  -     Row Name 12/05/22 1212          Gait/Stairs (Locomotion)    Gait/Stairs Locomotion gait/ambulation independence;gait/ambulation assistive device;distance ambulated;gait pattern  -     [place] Level (Gait) standby assist;contact guard;1 person assist  -     Assistive Device (Gait) --  no assistive device  -     Distance in Feet (Gait) 4  -DK      Pattern (Gait) step-through  -     Deviations/Abnormal Patterns (Gait) festinating/shuffling;stride length decreased  -     Bilateral Gait Deviations forward flexed posture  -     Comment, (Gait/Stairs) Pt ambulated with O2 and no assistive device.  Distance limited by length of O2 tubing.  -     Row Name 12/05/22 1212          Safety Issues, Functional Mobility    Impairments Affecting Function (Mobility) balance;endurance/activity tolerance;shortness of breath;strength  -     Row Name 12/05/22 1212          Balance    Balance Assessment sitting static balance;sitting dynamic balance;standing static balance;standing dynamic balance  -     Static Sitting Balance standby assist  -     Dynamic Sitting Balance standby assist  -DK     Position, Sitting Balance unsupported;sitting in chair  -     Static Standing Balance standby assist;contact guard;1-person assist  -     Dynamic Standing Balance standby assist;contact guard;1-person assist  -DK     Position/Device Used, Standing Balance --  no assistive device  -     Balance Interventions standing;dynamic;tandem gait  -     Row Name 12/05/22 1212          Motor Skills    Motor Skills --  therapeutic exercises  -     Coordination WFL  -     Therapeutic Exercise hip;knee;ankle  -     Row Name 12/05/22 1212          Hip (Therapeutic Exercise)    Hip (Therapeutic Exercise) AROM (active range of motion)  -     Hip AROM (Therapeutic Exercise) bilateral;flexion;extension;aBduction;aDduction;sitting;20 repititions  -     Row Name 12/05/22 1212          Knee (Therapeutic Exercise)    Knee (Therapeutic Exercise) AROM (active range of motion)  -     Knee AROM (Therapeutic Exercise) bilateral;flexion;extension;LAQ (long arc quad);sitting;20 repititions  -     Row Name 12/05/22 1212          Ankle (Therapeutic Exercise)    Ankle (Therapeutic Exercise) AROM (active range of motion)  -     Ankle AROM (Therapeutic Exercise)  bilateral;dorsiflexion;plantarflexion;sitting;20 repititions  -DK     Row Name 12/05/22 1212          Plan of Care Review    Plan of Care Reviewed With patient  -DK     Progress improving  -DK     Row Name 12/05/22 1212          Positioning and Restraints    Pre-Treatment Position sitting in chair/recliner  -DK     Post Treatment Position chair  -DK     In Chair sitting;call light within reach;encouraged to call for assist  -DK     Row Name 12/05/22 1212          Therapy Assessment/Plan (PT)    Rehab Potential (PT) good, to achieve stated therapy goals  -DK     Criteria for Skilled Interventions Met (PT) skilled treatment is necessary  -DK     Therapy Frequency (PT) daily  -DK     Problem List (PT) problems related to;balance;mobility;strength  -DK     Activity Limitations Related to Problem List (PT) unable to ambulate safely;unable to transfer safely  -DK     Row Name 12/05/22 1212          Progress Summary (PT)    Progress Toward Functional Goals (PT) progress toward functional goals is good  -DK           User Key  (r) = Recorded By, (t) = Taken By, (c) = Cosigned By    Initials Name Provider Type    Brook Greene PTA Physical Therapist Assistant                Physical Therapy Education     Title: PT OT SLP Therapies (Done)     Topic: Physical Therapy (Done)     Point: Mobility training (Done)     Learning Progress Summary           Patient Acceptance, E, VU,DU by RF at 12/5/2022 0909    Acceptance, E,TB, VU,NR by EV at 12/4/2022 0018    Acceptance, E, VU,DU by RF at 12/2/2022 0838    Acceptance, E,TB, VU by AD at 11/29/2022 1311                   Point: Home exercise program (Done)     Learning Progress Summary           Patient Acceptance, E, VU,DU by RF at 12/5/2022 0909    Acceptance, E,TB, VU,NR by EV at 12/4/2022 0018    Acceptance, E, VU,DU by RF at 12/2/2022 0838    Acceptance, E,TB, VU by JR at 11/30/2022 1433                   Point: Body mechanics (Done)     Learning Progress Summary            Patient Acceptance, E, VU,DU by RF at 12/5/2022 0909    Acceptance, E,TB, VU,NR by EV at 12/4/2022 0018    Acceptance, E, VU,DU by RF at 12/2/2022 0838    Acceptance, E,TB, VU by AD at 11/29/2022 1311                   Point: Precautions (Done)     Learning Progress Summary           Patient Acceptance, E, VU,DU by RF at 12/5/2022 0909    Acceptance, E,TB, VU,NR by EV at 12/4/2022 0018    Acceptance, E, VU,DU by RF at 12/2/2022 0838    Acceptance, E,TB, VU by AD at 11/29/2022 1311                               User Key     Initials Effective Dates Name Provider Type Discipline    EV 06/16/21 -  Shell Landon, RN Registered Nurse Nurse     03/14/22 -  Margarita Lebron, RN Registered Nurse Nurse     01/19/22 -  Kalyan Smalls RN Registered Nurse Nurse    AD 10/18/22 -  Jcarlos Min, OMK[employer] Student PT Student PT              PT Recommendation and Plan  Planned Therapy Interventions (PT): balance training, bed mobility training, gait training, home exercise program, strengthening, transfer training  Therapy Frequency (PT): daily  Progress Summary (PT)  Progress Toward Functional Goals (PT): progress toward functional goals is good  Plan of Care Reviewed With: patient  Progress: improving   Outcome Measures     Row Name 12/05/22 1212 12/04/22 0935          How much help from another person do you currently need...    Turning from your back to your side while in flat bed without using bedrails? 4  -DK 4  -DK     Moving from lying on back to sitting on the side of a flat bed without bedrails? 4  -DK 4  -DK     Moving to and from a bed to a chair (including a wheelchair)? 3  -DK 3  -DK     Standing up from a chair using your arms (e.g., wheelchair, bedside chair)? 3  -DK 3  -DK     Climbing 3-5 steps with a railing? 3  -DK 2  -DK     To walk in hospital room? 3  -DK 3  -DK     AM-PAC 6 Clicks Score (PT) 20  -DK 19  -DK        Functional Assessment    Outcome Measure Options AM-PAC 6 Clicks Basic Mobility (PT)  -DK  AM-PAC 6 Clicks Basic Mobility (PT)  -DK           User Key  (r) = Recorded By, (t) = Taken By, (c) = Cosigned By    Initials Name Provider Type    Brook Greene PTA Physical Therapist Assistant                 Time Calculation:    PT Charges     Row Name 12/05/22 1216             Time Calculation    PT Received On 12/05/22  -DK      PT Goal Re-Cert Due Date 12/08/22  -DK         Timed Charges    60829 - PT Therapeutic Exercise Minutes 12  -DK      36423 - Gait Training Minutes  3  -DK      38250 - PT Therapeutic Activity Minutes 9  -DK         Total Minutes    Timed Charges Total Minutes 24  -DK       Total Minutes 24  -DK            User Key  (r) = Recorded By, (t) = Taken By, (c) = Cosigned By    Initials Name Provider Type    Brook Greene PTA Physical Therapist Assistant              Therapy Charges for Today     Code Description Service Date Service Provider Modifiers Qty    48788749468 HC PT THER PROC EA 15 MIN 12/4/2022 Brook Guerra, PTA GP 1    45186283600 HC PT THERAPEUTIC ACT EA 15 MIN 12/4/2022 Brook Guerra PTA GP 1    53141707521 HC PT THER PROC EA 15 MIN 12/5/2022 Brook Guerra, JOSH GP 1    83913191741 HC PT THERAPEUTIC ACT EA 15 MIN 12/5/2022 Brook Guerra, JOSH GP 1          PT G-Codes  Outcome Measure Options: AM-PAC 6 Clicks Basic Mobility (PT)  AM-PAC 6 Clicks Score (PT): 20  AM-PAC 6 Clicks Score (OT): 19    Brook Guerra PTA  12/5/2022

## 2022-12-05 NOTE — CONSULTS
"Nutrition Services    Patient Name: Nova Manuel  YOB: 1965  MRN: [id number]  Admission date: 11/18/2022      CLINICAL NUTRITION ASSESSMENT      Reason for Assessment  Follow-up protocol   H&P:    Past Medical History:   Diagnosis Date   • Anxiety    • Asthma    • CHF (congestive heart failure) (HCC)    • COPD (chronic obstructive pulmonary disease) (HCC)    • Diabetes mellitus (HCC)    • Disease of thyroid gland    • Hyperlipidemia    • Hypertension         Current Problems:   Active Hospital Problems    Diagnosis    • **Acute on chronic respiratory failure with hypoxia and hypercapnia (HCC)    • COPD with acute exacerbation (HCC)    • Restrictive lung disease         Nutrition/Diet History         Narrative     RD follow up for high risk nutrition assessment. Pt diet advanced on 11/30, intake records note % consumed last two days. Weight history shows severe weight loss x 30 days, pt was receiving hypocaloric EN as a result of not being able to consume PO intake 2/2 bipap. NFPE performed, no overt signs of fat/muscle wasting.        Anthropometrics        Current Height, Weight Height: 157.5 cm (62\")  Weight: 89.5 kg (197 lb 5 oz)   Current BMI Body mass index is 36.09 kg/m².  Class III Obesity       Weight Hx  Wt Readings from Last 30 Encounters:   12/06/22 0432 89.5 kg (197 lb 5 oz)   12/05/22 0625 90.3 kg (199 lb 1.2 oz)   12/05/22 0310 90.3 kg (199 lb 1.2 oz)   12/04/22 1540 87.9 kg (193 lb 12.6 oz)   12/04/22 1409 86.2 kg (190 lb 0.6 oz)   12/03/22 0515 98.1 kg (216 lb 4.3 oz)   12/02/22 0548 98.4 kg (216 lb 14.9 oz)   12/01/22 0600 98.8 kg (217 lb 13 oz)   11/30/22 0600 92.8 kg (204 lb 9.4 oz)   11/29/22 0505 90.9 kg (200 lb 6.4 oz)   11/28/22 0642 92.4 kg (203 lb 11.3 oz)   11/27/22 0700 91.5 kg (201 lb 11.5 oz)   11/25/22 0609 93.6 kg (206 lb 5.6 oz)   11/19/22 0105 101 kg (223 lb 8.7 oz)   11/18/22 2151 95.3 kg (210 lb 1.6 oz)   11/04/22 1416 96.8 kg (213 lb 6.4 oz)   11/02/21 1142 " 95.3 kg (210 lb)   10/03/18 1343 111 kg (245 lb)   02/19/18 0000 111 kg (245 lb)            Wt Change Observation Loss 6.5% x 30 days, severe     Estimated/Assessed Needs       Energy Requirements 30-35 kcal/kg Adj BW   EST Needs (kcal/day) 4722-4376       Protein Requirements 1.2-1.5 g/kg Adj BW   EST Daily Needs (g/day) 72-90       Fluid Requirements 25 mL/kg    Estimated Needs (mL/day) 2250     Labs/Medications         Pertinent Labs Reviewed.   Results from last 7 days   Lab Units 12/03/22  0454 12/02/22  0518 12/01/22  0440   SODIUM mmol/L 137 138 135*   POTASSIUM mmol/L 4.4 4.5 4.6   CHLORIDE mmol/L 92* 91* 88*   CO2 mmol/L 41.2* 45.3* 43.0*   BUN mg/dL 23* 24* 26*   CREATININE mg/dL 0.72 0.71 0.62   CALCIUM mg/dL 10.2 11.0* 10.2   GLUCOSE mg/dL 125* 126* 100*     Results from last 7 days   Lab Units 12/03/22  0454 12/02/22 0518 12/01/22  0440   MAGNESIUM mg/dL 1.8 2.0 1.9   PHOSPHORUS mg/dL  --  3.0 2.6   HEMOGLOBIN g/dL 12.6 12.7 12.4   HEMATOCRIT % 38.3 38.9 38.8     Lab Results   Component Value Date    HGBA1C 5.90 (H) 11/20/2022         Pertinent Medications Reviewed.     Current Nutrition Orders & Evaluation of Intake       Oral Nutrition     Current PO Diet Diet: Regular/House Diet, Diabetic Diets; Consistent Carbohydrate; Texture: Soft to Chew (NDD 3); Soft to Chew: Whole Meat; Fluid Consistency: Thin (IDDSI 0)   Supplement Orders Placed This Encounter      DIET MESSAGE Patient requests sausage, eggs, and whole milk on tray       Nutrition Diagnosis         Nutrition Dx Problem 1 Increased nutrient needs related to increased nutrient needs due to catabolic disease as evidenced by inadequate energy intake., unintended wt change. and COPD exacerbation and metapneumonvirus.     Nutrition Intervention         Continue current diet order     Medical Nutrition Therapy/Nutrition Education          Learner     Readiness Patient  Education not indicated at this time     Method     Response N/A  N/A      Monitor/Evaluation        Monitor PO intake, Pertinent labs, Weight     Nutrition Discharge Plan         To be determined     Electronically signed by:  Zoila Daily RD  12/05/22 13:58 EST

## 2022-12-05 NOTE — PLAN OF CARE
Goal Outcome Evaluation:              Outcome Evaluation: AOx4. Medicated for anxiety per mar, emotional support provided. Up to chair and bsc with x1 assist. Maintaining 90-95% on 4L NC, still with SOA on exertion. No complaints of pain. Blood glucose monitored. Precert pending at this time.

## 2022-12-06 LAB
GLUCOSE BLDC GLUCOMTR-MCNC: 134 MG/DL (ref 70–99)
GLUCOSE BLDC GLUCOMTR-MCNC: 152 MG/DL (ref 70–99)

## 2022-12-06 PROCEDURE — 94761 N-INVAS EAR/PLS OXIMETRY MLT: CPT

## 2022-12-06 PROCEDURE — 25010000002 ONDANSETRON PER 1 MG: Performed by: INTERNAL MEDICINE

## 2022-12-06 PROCEDURE — 63710000001 INSULIN LISPRO (HUMAN) PER 5 UNITS: Performed by: INTERNAL MEDICINE

## 2022-12-06 PROCEDURE — 25010000002 ENOXAPARIN PER 10 MG: Performed by: FAMILY MEDICINE

## 2022-12-06 PROCEDURE — 94799 UNLISTED PULMONARY SVC/PX: CPT

## 2022-12-06 PROCEDURE — 99232 SBSQ HOSP IP/OBS MODERATE 35: CPT | Performed by: INTERNAL MEDICINE

## 2022-12-06 PROCEDURE — 97110 THERAPEUTIC EXERCISES: CPT

## 2022-12-06 PROCEDURE — 97530 THERAPEUTIC ACTIVITIES: CPT

## 2022-12-06 PROCEDURE — 82962 GLUCOSE BLOOD TEST: CPT

## 2022-12-06 PROCEDURE — 94664 DEMO&/EVAL PT USE INHALER: CPT

## 2022-12-06 RX ADMIN — BUDESONIDE 0.25 MG: 0.25 INHALANT ORAL at 18:35

## 2022-12-06 RX ADMIN — Medication 10 ML: at 08:55

## 2022-12-06 RX ADMIN — DIVALPROEX SODIUM 125 MG: 125 CAPSULE, COATED PELLETS ORAL at 20:31

## 2022-12-06 RX ADMIN — IPRATROPIUM BROMIDE AND ALBUTEROL SULFATE 3 ML: 2.5; .5 SOLUTION RESPIRATORY (INHALATION) at 11:30

## 2022-12-06 RX ADMIN — ARFORMOTEROL TARTRATE INHALATION 15 MCG: 15 SOLUTION RESPIRATORY (INHALATION) at 18:35

## 2022-12-06 RX ADMIN — ONDANSETRON 4 MG: 2 INJECTION INTRAMUSCULAR; INTRAVENOUS at 18:08

## 2022-12-06 RX ADMIN — MONTELUKAST 10 MG: 10 TABLET, FILM COATED ORAL at 08:55

## 2022-12-06 RX ADMIN — ACETAMINOPHEN 1000 MG: 500 TABLET ORAL at 05:12

## 2022-12-06 RX ADMIN — INSULIN LISPRO 2 UNITS: 100 INJECTION, SOLUTION INTRAVENOUS; SUBCUTANEOUS at 08:55

## 2022-12-06 RX ADMIN — SERTRALINE HYDROCHLORIDE 200 MG: 100 TABLET ORAL at 08:54

## 2022-12-06 RX ADMIN — IPRATROPIUM BROMIDE AND ALBUTEROL SULFATE 3 ML: 2.5; .5 SOLUTION RESPIRATORY (INHALATION) at 07:01

## 2022-12-06 RX ADMIN — ARFORMOTEROL TARTRATE INHALATION 15 MCG: 15 SOLUTION RESPIRATORY (INHALATION) at 07:01

## 2022-12-06 RX ADMIN — PHENOL 3 SPRAY: 1.5 LIQUID ORAL at 16:04

## 2022-12-06 RX ADMIN — BUTALBITAL, ACETAMINOPHEN AND CAFFEINE 1 CAPSULE: 300; 40; 50 CAPSULE ORAL at 16:04

## 2022-12-06 RX ADMIN — ALPRAZOLAM 0.5 MG: 0.25 TABLET ORAL at 12:00

## 2022-12-06 RX ADMIN — IPRATROPIUM BROMIDE AND ALBUTEROL SULFATE 3 ML: 2.5; .5 SOLUTION RESPIRATORY (INHALATION) at 18:35

## 2022-12-06 RX ADMIN — IPRATROPIUM BROMIDE AND ALBUTEROL SULFATE 3 ML: 2.5; .5 SOLUTION RESPIRATORY (INHALATION) at 01:35

## 2022-12-06 RX ADMIN — SPIRONOLACTONE 25 MG: 25 TABLET ORAL at 08:55

## 2022-12-06 RX ADMIN — DIVALPROEX SODIUM 125 MG: 125 CAPSULE, COATED PELLETS ORAL at 08:55

## 2022-12-06 RX ADMIN — ALPRAZOLAM 0.5 MG: 0.25 TABLET ORAL at 18:08

## 2022-12-06 RX ADMIN — ATENOLOL 50 MG: 50 TABLET ORAL at 08:55

## 2022-12-06 RX ADMIN — LEVOTHYROXINE SODIUM 125 MCG: 125 TABLET ORAL at 05:10

## 2022-12-06 RX ADMIN — ALPRAZOLAM 0.5 MG: 0.25 TABLET ORAL at 05:10

## 2022-12-06 RX ADMIN — ENOXAPARIN SODIUM 40 MG: 100 INJECTION SUBCUTANEOUS at 08:55

## 2022-12-06 RX ADMIN — ASPIRIN 81 MG CHEWABLE TABLET 81 MG: 81 TABLET CHEWABLE at 08:55

## 2022-12-06 RX ADMIN — BUDESONIDE 0.25 MG: 0.25 INHALANT ORAL at 07:01

## 2022-12-06 NOTE — PLAN OF CARE
Goal Outcome Evaluation:              Outcome Evaluation: AOx4. Medicated for c/o pain and anxiety per mar. Up to chair and bsc with standby assistance. Remains on 4L NC. Blood glucose monitored. Awaiting rehab placement.

## 2022-12-06 NOTE — THERAPY TREATMENT NOTE
Acute Care - Physical Therapy Treatment Note  [facility]     Patient Name: Nova Manuel  : 1965  MRN: [id number]  Today's Date: 2022      Visit Dx:     ICD-10-CM ICD-9-CM   1. Acute on chronic respiratory failure with hypoxia and hypercapnia (HCC)  J96.21 518.84    J96.22 786.09     799.02   2. COPD with acute exacerbation (HCC)  J44.1 491.21   3. Oropharyngeal dysphagia  R13.12 787.22   4. Decreased activities of daily living (ADL)  Z78.9 V49.89   5. Difficulty walking  R26.2 719.7     Patient Active Problem List   Diagnosis   • Centrilobular emphysema (HCC)   • Chronic respiratory failure with hypoxia and hypercapnia (HCC)   • Acute on chronic respiratory failure with hypoxia and hypercapnia (HCC)   • COPD with acute exacerbation (HCC)   • Restrictive lung disease   • Allergic rhinitis   • Anemia, pernicious   • Anxiety   • Asthma   • Diabetes (HCC)   • Migraine   • Mitral valve prolapse   • Morbid obesity with body mass index of 40.0-44.9 in adult (HCC)     Past Medical History:   Diagnosis Date   • Anxiety    • Asthma    • CHF (congestive heart failure) (HCC)    • COPD (chronic obstructive pulmonary disease) (HCC)    • Diabetes mellitus (HCC)    • Disease of thyroid gland    • Hyperlipidemia    • Hypertension      Past Surgical History:   Procedure Laterality Date   •  SECTION     • CHOLECYSTECTOMY     • HYSTERECTOMY       PT Assessment (last 12 hours)     PT Evaluation and Treatment     Row Name 22 0939          Physical Therapy Time and Intention    Subjective Information complains of;weakness;fatigue  -DK     Document Type therapy note (daily note)  -DK     Mode of Treatment individual therapy;physical therapy  -DK     Patient Effort good  -DK     Symptoms Noted During/After Treatment fatigue  -DK     Row Name 22 0939          Pain    Pretreatment Pain Rating 6/10  -DK     Posttreatment Pain Rating 6/10  -DK     Pain Location - Side/Orientation Right  -DK     Pain Location  generalized  -DK     Pain Location - back;knee  -DK     Pain Intervention(s) Repositioned;Ambulation/increased activity;Distraction;Therapeutic presence  -DK     Row Name 12/06/22 0939          Cognition    Affect/Mental Status (Cognition) WFL  -DK     Orientation Status (Cognition) oriented x 4  -DK     Follows Commands (Cognition) WFL  -DK     Cognitive Function WFL  -DK     Personal Safety Interventions gait belt;nonskid shoes/slippers when out of bed;supervised activity  -DK     Row Name 12/06/22 0939          Transfers    Transfers sit-stand transfer;stand-sit transfer  -DK     Row Name 12/06/22 0939          Sit-Stand Transfer    Sit-Stand [place] (Transfers) standby assist;contact guard;1 person assist  -DK     Assistive Device (Sit-Stand Transfers) walker, front-wheeled  -DK     Row Name 12/06/22 0939          Stand-Sit Transfer    Stand-Sit [place] (Transfers) standby assist;contact guard;1 person assist  -DK     Assistive Device (Stand-Sit Transfers) walker, front-wheeled  -DK     Row Name 12/06/22 0939          Gait/Stairs (Locomotion)    Gait/Stairs Locomotion gait/ambulation independence;gait/ambulation assistive device;distance ambulated;gait pattern  -DK     [place] Level (Gait) contact guard;1 person assist  -DK     Assistive Device (Gait) --  no assistive device  -DK     Distance in Feet (Gait) 4  -DK     Pattern (Gait) step-to  -DK     Deviations/Abnormal Patterns (Gait) festinating/shuffling;stride length decreased  -DK     Bilateral Gait Deviations forward flexed posture  -DK     Comment, (Gait/Stairs) Pt stood and ambulated with O2, no assistive device and assist x 1.  She was left in the recliner post treatment.  -DK     Row Name 12/06/22 0939          Safety Issues, Functional Mobility    Impairments Affecting Function (Mobility) endurance/activity tolerance;pain;shortness of breath;strength  -DK     Row Name 12/06/22 0939          Balance    Balance Assessment sitting static  balance;sitting dynamic balance;standing static balance;standing dynamic balance  -DK     Static Sitting Balance standby assist  -DK     Dynamic Sitting Balance standby assist  -DK     Position, Sitting Balance unsupported;sitting edge of bed;sitting in chair  -DK     Static Standing Balance contact guard;1-person assist  -DK     Dynamic Standing Balance contact guard;1-person assist  -DK     Position/Device Used, Standing Balance --  no assistive device  -DK     Balance Interventions standing;dynamic;tandem gait  -     Row Name 12/06/22 0939          Motor Skills    Motor Skills --  therapeutic exercises  -     Coordination WFL  -     Therapeutic Exercise hip;knee;ankle  -     Row Name 12/06/22 0939          Hip (Therapeutic Exercise)    Hip (Therapeutic Exercise) AAROM (active assistive range of motion)  -     Hip AAROM (Therapeutic Exercise) bilateral;flexion;extension;aBduction;aDduction;supine;10 repetitions;2 sets  -     Row Name 12/06/22 0939          Knee (Therapeutic Exercise)    Knee (Therapeutic Exercise) AAROM (active assistive range of motion)  -     Knee AAROM (Therapeutic Exercise) bilateral;flexion;extension;supine;10 repetitions;2 sets  -     Row Name 12/06/22 0939          Ankle (Therapeutic Exercise)    Ankle (Therapeutic Exercise) AAROM (active assistive range of motion)  -     Ankle AAROM (Therapeutic Exercise) bilateral;dorsiflexion;plantarflexion;supine;10 repetitions;2 sets  -     Row Name 12/06/22 0939          Plan of Care Review    Plan of Care Reviewed With patient  -     Progress improving  -     Row Name 12/06/22 0939          Positioning and Restraints    Pre-Treatment Position in bed  -DK     Post Treatment Position chair  -DK     In Chair reclined;call light within reach;encouraged to call for assist;legs elevated  -     Row Name 12/06/22 0939          Therapy Assessment/Plan (PT)    Rehab Potential (PT) good, to achieve stated therapy goals  -DK      Criteria for Skilled Interventions Met (PT) skilled treatment is necessary  -DK     Therapy Frequency (PT) daily  -DK     Problem List (PT) problems related to;balance;mobility;strength;pain  -DK     Activity Limitations Related to Problem List (PT) unable to ambulate safely;unable to transfer safely  -DK     Row Name 12/06/22 0939          Progress Summary (PT)    Progress Toward Functional Goals (PT) progress toward functional goals is good  -DK           User Key  (r) = Recorded By, (t) = Taken By, (c) = Cosigned By    Initials Name Provider Type    Brook Greene PTA Physical Therapist Assistant                Physical Therapy Education     Title: PT OT SLP Therapies (Done)     Topic: Physical Therapy (Done)     Point: Mobility training (Done)     Learning Progress Summary           Patient Acceptance, E, VU,DU by RF at 12/6/2022 0856    Acceptance, E, VU,DU by RF at 12/5/2022 0909    Acceptance, E,TB, VU,NR by EV at 12/4/2022 0018    Acceptance, E, VU,DU by RF at 12/2/2022 0838    Acceptance, E,TB, VU by AD at 11/29/2022 1311                   Point: Home exercise program (Done)     Learning Progress Summary           Patient Acceptance, E, VU,DU by RF at 12/6/2022 0856    Acceptance, E, VU,DU by RF at 12/5/2022 0909    Acceptance, E,TB, VU,NR by EV at 12/4/2022 0018    Acceptance, E, VU,DU by RF at 12/2/2022 0838    Acceptance, E,TB, VU by JR at 11/30/2022 1433                   Point: Body mechanics (Done)     Learning Progress Summary           Patient Acceptance, E, VU,DU by RF at 12/6/2022 0856    Acceptance, E, VU,DU by RF at 12/5/2022 0909    Acceptance, E,TB, VU,NR by EV at 12/4/2022 0018    Acceptance, E, VU,DU by RF at 12/2/2022 0838    Acceptance, E,TB, VU by AD at 11/29/2022 1311                   Point: Precautions (Done)     Learning Progress Summary           Patient Acceptance, E, VU,DU by RF at 12/6/2022 0856    Acceptance, E, VU,DU by RF at 12/5/2022 0909    Acceptance, E,TB, VU,NR by EV  at 12/4/2022 0018    Acceptance, E, VU,DU by RF at 12/2/2022 0838    Acceptance, E,TB, VU by AD at 11/29/2022 1311                               User Key     Initials Effective Dates Name Provider Type Discipline    EV 06/16/21 -  Shell Landon, RN Registered Nurse Nurse    JR 03/14/22 -  Margarita Lebron, RN Registered Nurse Nurse    RF 01/19/22 -  Kalyan Smalls, RN Registered Nurse Nurse    AD 10/18/22 -  Jcarlos Min, OMK[employer] Student PT Student PT              PT Recommendation and Plan  Planned Therapy Interventions (PT): balance training, bed mobility training, gait training, strengthening, transfer training, home exercise program  Therapy Frequency (PT): daily  Progress Summary (PT)  Progress Toward Functional Goals (PT): progress toward functional goals is good  Plan of Care Reviewed With: patient  Progress: improving   Outcome Measures     Row Name 12/06/22 0939 12/05/22 1212 12/04/22 0935       How much help from another person do you currently need...    Turning from your back to your side while in flat bed without using bedrails? 4  -DK 4  -DK 4  -DK    Moving from lying on back to sitting on the side of a flat bed without bedrails? 4  -DK 4  -DK 4  -DK    Moving to and from a bed to a chair (including a wheelchair)? 3  -DK 3  -DK 3  -DK    Standing up from a chair using your arms (e.g., wheelchair, bedside chair)? 3  -DK 3  -DK 3  -DK    Climbing 3-5 steps with a railing? 3  -DK 3  -DK 2  -DK    To walk in hospital room? 3  -DK 3  -DK 3  -DK    AM-PAC 6 Clicks Score (PT) 20  -DK 20  -DK 19  -DK       Functional Assessment    Outcome Measure Options AM-PAC 6 Clicks Basic Mobility (PT)  -DK AM-PAC 6 Clicks Basic Mobility (PT)  -DK AM-PAC 6 Clicks Basic Mobility (PT)  -DK          User Key  (r) = Recorded By, (t) = Taken By, (c) = Cosigned By    Initials Name Provider Type    Brook Greene PTA Physical Therapist Assistant                 Time Calculation:    PT Charges     Row Name 12/06/22 0944              Time Calculation    PT Received On 12/06/22  -DK      PT Goal Re-Cert Due Date 12/08/22  -DK         Timed Charges    34400 - PT Therapeutic Exercise Minutes 14  -DK      75963 - Gait Training Minutes  3  -DK      40751 - PT Therapeutic Activity Minutes 7  -DK         Total Minutes    Timed Charges Total Minutes 24  -DK       Total Minutes 24  -DK            User Key  (r) = Recorded By, (t) = Taken By, (c) = Cosigned By    Initials Name Provider Type    Brook Greene PTA Physical Therapist Assistant              Therapy Charges for Today     Code Description Service Date Service Provider Modifiers Qty    93161383729 HC PT THER PROC EA 15 MIN 12/5/2022 Brook Guerra, PTA GP 1    73525538366 HC PT THERAPEUTIC ACT EA 15 MIN 12/5/2022 Brook Guerra, JOSH GP 1    10089985387 HC PT THER PROC EA 15 MIN 12/6/2022 Brook Guerra, PTA GP 1    02931855390 HC PT THERAPEUTIC ACT EA 15 MIN 12/6/2022 Brook Guerra, PTA GP 1          PT G-Codes  Outcome Measure Options: AM-PAC 6 Clicks Basic Mobility (PT)  AM-PAC 6 Clicks Score (PT): 20  AM-PAC 6 Clicks Score (OT): 19    Brook Guerra PTA  12/6/2022

## 2022-12-06 NOTE — PROGRESS NOTES
[facility]   Hospitalist Progress Note  Date: 2022  Patient Name: Nova Manuel  : 1965  MRN: [id number]  Date of admission: 2022  Consultants:   -Pulmonology/Critical Care: Dr. Donte Mcqueen, Dr. Cheryle Silverio, Dr. Kamran Marquez, Dr. Levi Infante, Dr. Kamran Marquez    Subjective   Subjective     Chief Complaint: Shortness of breath    Summary:   Nova Manuel is a 57 y.o. female with severe COPD (FEV1: 13%), chronic hypoxemic and hypercapnic respiratory failure on 2 L O2 via nasal cannula and nocturnal NIPPV who presented to the ED with complaints of worsening shortness of breath.  Upon evaluation in ED patient was in respiratory distress and started on NIPPV with improvement in symptoms.  CXR did not demonstrate acute infiltrate or effusion.  CT chest negative for PE.  Hospitalist service contacted for further evaluation management.  Pulmonology consulted.  Systemic steroids, and inhaled bronchodilators and empiric antibiotics initiated.  Respiratory panel returned positive for human metapneumovirus.  Patient continued on BiPAP and respiratory function with some improvement.  Given patient's COPD severity palliative care was consulted and decision made to continue with aggressive care measures.  Patient unable to tolerate much p.o. intake some core track was placed and nutrition initiated.  Patient's respiratory status has been improving and patient able to be weaned from BiPAP to Airvo and subsequently to nasal cannula and home trilogy at night.  Rehab referrals made by social work, pre-CERT pending at [facility].    Interval Followup:   No acute events overnight.  Patient states that her breathing has been stable.  She continues to use incentive spirometer throughout the day.  She is very pleased with her progress.  She denies any chest pain, abdominal pain, nausea or vomiting.  Patient still pending for [facility] at this time.  Nursing with no additional acute issues to report.    Review  of Systems   All systems reviewed and negative unless stated otherwise under subjective.    Objective   Objective     Vitals:   Temp:  [97.8 °F (36.6 °C)-98.8 °F (37.1 °C)] 98.4 °F (36.9 °C)  Heart Rate:  [70-75] 73  Resp:  [18-20] 20  BP: (110-132)/(56-66) 112/58  Flow (L/min):  [4] 4  Physical Exam   Gen: No acute distress, conversant, pleasant, sitting up in chair bedside  HEENT: MMM, Atraumatic  Neck: Supple, Trachea midline  Resp: CTAB, improved bilateral rhonchi, no increase in work of breathing, equal chest rise bilaterally, no conversational dyspnea noted  Card: RRR, No m/r/g  Abd: Soft, Nontender, Nondistended, + bowel sounds  Ext: No cyanosis, No clubbing  Neuro: CN II-XII grossly intact, No focal deficits appreciated  Psych: AAO x 3, Normal mood, Normal affect    Result Review    Result Review:  I have personally reviewed the results as below and agree with these findings:  []  Laboratory:   CMP    CMP 12/1/22 12/2/22 12/3/22   Glucose 100 (A) 126 (A) 125 (A)   BUN 26 (A) 24 (A) 23 (A)   Creatinine 0.62 0.71 0.72   Sodium 135 (A) 138 137   Potassium 4.6 4.5 4.4   Chloride 88 (A) 91 (A) 92 (A)   Calcium 10.2 11.0 (A) 10.2   (A) Abnormal value       Comments are available for some flowsheets but are not being displayed.           CBC    CBC 12/1/22 12/2/22 12/3/22   WBC 9.60 8.36 7.63   RBC 3.92 4.04 4.02   Hemoglobin 12.4 12.7 12.6   Hematocrit 38.8 38.9 38.3   MCV 99.0 (A) 96.3 95.3   MCH 31.6 31.4 31.3   MCHC 32.0 32.6 32.9   RDW 12.3 12.3 12.0 (A)   Platelets 179 167 188   (A) Abnormal value            []  Microbiology:   []  Radiology:   []  EKG/Telemetry:   []  Cardiology/Vascular:    []  Pathology:  []  Old records:  []  Other:    Assessment & Plan   Assessment / Plan     Assessment:  Acute on chronic hypercarbic and hypoxemic respiratory failure requiring NIPPV.  Patient has home oxygen  Acute COPD exacerbation  Human metapneumovirus pneumonia  Medical noncompliance  Obesity with BMI: 37.42  Type 2  diabetes mellitus with hyperglycemia  Anxiety  Contraction alkalosis  Hypertension    Plan:  -Pulmonology/Critical Care consulted and following, appreciate assistance and recommendations in the care of this patient.  -Continue supplemental O2 to maintain sats greater than 90%, wean as tolerated  -Continue home trilogy at night and with naps and as needed throughout the day  -Continue Brovana, Pulmicort and duo nebs  -Continue Singulair  -Encouraged patient to continue using incentive spirometer  -No change insulin regimen.  Continue to monitor blood glucose level and SSI requirement and titrate accordingly.  -PT/OT consulted.  Patient encouraged to increase activity.  Out of bed to chair as tolerated.  I believe that with continued work with skilled therapy services patient's condition will improve and eventually patient would be able to return home.  Pre-CERT pending for rehab placement at this time.  -Continue appropriate home medications  -   DVT Prophylaxis: Lovenox  Diet: Regular  Dispo: Social work making referrals  Code Status: Full code     Personally reviewed patients labs and imaging, discussed with patient and nurse at bedside.  Discussed with      Part of this note may be an electronic transcription/translation of spoken language to printed text using the Dragon dictation system.    DVT prophylaxis:  Medical DVT prophylaxis orders are present.    CODE STATUS:   Code Status (Patient has no pulse and is not breathing): CPR (Attempt to Resuscitate)  Medical Interventions (Patient has pulse or is breathing): Full Support  Release to patient: Routine Release        Electronically signed by Rodriog Espinosa MD, 12/06/22, 5:41 PM EST.

## 2022-12-06 NOTE — PLAN OF CARE
Goal Outcome Evaluation:           Progress: no change  Outcome Evaluation: Medicated with PRN Tylenol and Xanax. Vital signs stable, will continue to monitor.

## 2022-12-07 LAB
GLUCOSE BLDC GLUCOMTR-MCNC: 116 MG/DL (ref 70–99)
GLUCOSE BLDC GLUCOMTR-MCNC: 120 MG/DL (ref 70–99)
GLUCOSE BLDC GLUCOMTR-MCNC: 132 MG/DL (ref 70–99)
GLUCOSE BLDC GLUCOMTR-MCNC: 140 MG/DL (ref 70–99)

## 2022-12-07 PROCEDURE — 94799 UNLISTED PULMONARY SVC/PX: CPT

## 2022-12-07 PROCEDURE — 94761 N-INVAS EAR/PLS OXIMETRY MLT: CPT

## 2022-12-07 PROCEDURE — 99232 SBSQ HOSP IP/OBS MODERATE 35: CPT | Performed by: INTERNAL MEDICINE

## 2022-12-07 PROCEDURE — 97530 THERAPEUTIC ACTIVITIES: CPT

## 2022-12-07 PROCEDURE — 25010000002 ENOXAPARIN PER 10 MG: Performed by: FAMILY MEDICINE

## 2022-12-07 PROCEDURE — 94664 DEMO&/EVAL PT USE INHALER: CPT

## 2022-12-07 PROCEDURE — 97110 THERAPEUTIC EXERCISES: CPT

## 2022-12-07 PROCEDURE — 25010000002 ONDANSETRON PER 1 MG: Performed by: INTERNAL MEDICINE

## 2022-12-07 PROCEDURE — 82962 GLUCOSE BLOOD TEST: CPT

## 2022-12-07 RX ADMIN — ONDANSETRON 4 MG: 2 INJECTION INTRAMUSCULAR; INTRAVENOUS at 08:57

## 2022-12-07 RX ADMIN — DIVALPROEX SODIUM 125 MG: 125 CAPSULE, COATED PELLETS ORAL at 20:54

## 2022-12-07 RX ADMIN — ALPRAZOLAM 0.5 MG: 0.25 TABLET ORAL at 01:04

## 2022-12-07 RX ADMIN — BUDESONIDE 0.25 MG: 0.25 INHALANT ORAL at 06:42

## 2022-12-07 RX ADMIN — Medication 10 ML: at 08:56

## 2022-12-07 RX ADMIN — MONTELUKAST 10 MG: 10 TABLET, FILM COATED ORAL at 08:57

## 2022-12-07 RX ADMIN — ALPRAZOLAM 0.5 MG: 0.25 TABLET ORAL at 20:53

## 2022-12-07 RX ADMIN — LEVOTHYROXINE SODIUM 125 MCG: 125 TABLET ORAL at 05:55

## 2022-12-07 RX ADMIN — ENOXAPARIN SODIUM 40 MG: 100 INJECTION SUBCUTANEOUS at 08:56

## 2022-12-07 RX ADMIN — ALPRAZOLAM 0.5 MG: 0.25 TABLET ORAL at 07:17

## 2022-12-07 RX ADMIN — IPRATROPIUM BROMIDE AND ALBUTEROL SULFATE 3 ML: 2.5; .5 SOLUTION RESPIRATORY (INHALATION) at 11:35

## 2022-12-07 RX ADMIN — ATENOLOL 50 MG: 50 TABLET ORAL at 08:56

## 2022-12-07 RX ADMIN — IPRATROPIUM BROMIDE AND ALBUTEROL SULFATE 3 ML: 2.5; .5 SOLUTION RESPIRATORY (INHALATION) at 06:42

## 2022-12-07 RX ADMIN — DICLOFENAC SODIUM 2 G: 10 GEL TOPICAL at 20:54

## 2022-12-07 RX ADMIN — BUTALBITAL, ACETAMINOPHEN AND CAFFEINE 1 CAPSULE: 300; 40; 50 CAPSULE ORAL at 18:22

## 2022-12-07 RX ADMIN — ASPIRIN 81 MG CHEWABLE TABLET 81 MG: 81 TABLET CHEWABLE at 08:57

## 2022-12-07 RX ADMIN — SERTRALINE HYDROCHLORIDE 200 MG: 100 TABLET ORAL at 08:56

## 2022-12-07 RX ADMIN — IPRATROPIUM BROMIDE AND ALBUTEROL SULFATE 3 ML: 2.5; .5 SOLUTION RESPIRATORY (INHALATION) at 20:29

## 2022-12-07 RX ADMIN — ARFORMOTEROL TARTRATE INHALATION 15 MCG: 15 SOLUTION RESPIRATORY (INHALATION) at 06:42

## 2022-12-07 RX ADMIN — DIVALPROEX SODIUM 125 MG: 125 CAPSULE, COATED PELLETS ORAL at 08:57

## 2022-12-07 RX ADMIN — ALPRAZOLAM 0.5 MG: 0.25 TABLET ORAL at 13:21

## 2022-12-07 RX ADMIN — SPIRONOLACTONE 25 MG: 25 TABLET ORAL at 08:57

## 2022-12-07 RX ADMIN — IPRATROPIUM BROMIDE AND ALBUTEROL SULFATE 3 ML: 2.5; .5 SOLUTION RESPIRATORY (INHALATION) at 00:15

## 2022-12-07 RX ADMIN — BUDESONIDE 0.25 MG: 0.25 INHALANT ORAL at 20:30

## 2022-12-07 RX ADMIN — ARFORMOTEROL TARTRATE INHALATION 15 MCG: 15 SOLUTION RESPIRATORY (INHALATION) at 20:29

## 2022-12-07 NOTE — PROGRESS NOTES
[facility]   Hospitalist Progress Note  Date: 2022  Patient Name: Nova Manuel  : 1965  MRN: [id number]  Date of admission: 2022  Consultants:   -Pulmonology/Critical Care: Dr. Donte Mcqueen, Dr. Cheryle Silverio, Dr. Kamran Marquez, Dr. Levi Infante, Dr. Kamran Marquez    Subjective   Subjective     Chief Complaint: Shortness of breath    Summary:   Nova Manuel is a 57 y.o. female with severe COPD (FEV1: 13%), chronic hypoxemic and hypercapnic respiratory failure on 2 L O2 via nasal cannula and nocturnal NIPPV who presented to the ED with complaints of worsening shortness of breath.  Upon evaluation in ED patient was in respiratory distress and started on NIPPV with improvement in symptoms.  CXR did not demonstrate acute infiltrate or effusion.  CT chest negative for PE.  Hospitalist service contacted for further evaluation management.  Pulmonology consulted.  Systemic steroids, and inhaled bronchodilators and empiric antibiotics initiated.  Respiratory panel returned positive for human metapneumovirus.  Patient continued on BiPAP and respiratory function with some improvement.  Given patient's COPD severity palliative care was consulted and decision made to continue with aggressive care measures.  Patient unable to tolerate much p.o. intake some core track was placed and nutrition initiated.  Patient's respiratory status has been improving and patient able to be weaned from BiPAP to Airvo and subsequently to nasal cannula and home trilogy at night.  Rehab referrals made by social work, pre-CERT pending at [facility].  She was denied by insurance to go to [facility].  [facility] filing appeal.    Interval Followup:   No acute events overnight.  Complaining of pain in right knee.  Has DJD and at times it hurts bad  Remains on 4 L with good oxygen saturation   patient states that her breathing has been stable.  She continues to use incentive spirometer throughout the day.  She is very pleased with  her progress.  She denies any chest pain, abdominal pain, nausea or vomiting.        Review of Systems   All systems reviewed and negative unless stated otherwise under subjective.    Objective   Objective     Vitals:   Temp:  [97.6 °F (36.4 °C)-98.8 °F (37.1 °C)] 98.4 °F (36.9 °C)  Heart Rate:  [68-82] 82  Resp:  [18-20] 18  BP: (116-142)/(52-64) 132/58  Flow (L/min):  [4] 4  Physical Exam   Gen: No acute distress, conversant, pleasant, sitting up in chair bedside  HEENT: MMM, Atraumatic  Neck: Supple, Trachea midline  Resp: CTAB, improved bilateral rhonchi, no increase in work of breathing, equal chest rise bilaterally, no conversational dyspnea noted  Card: RRR, No m/r/g  Abd: Soft, Nontender, Nondistended, + bowel sounds  Ext: No cyanosis, No clubbing.  Warm mildly tender right knee without redness.  Neuro: CN II-XII grossly intact, No focal deficits appreciated  Psych: AAO x 3, Normal mood, Normal affect    Result Review    Result Review:  I have personally reviewed the results as below and agree with these findings:  []  Laboratory:   CMP    CMP 12/1/22 12/2/22 12/3/22   Glucose 100 (A) 126 (A) 125 (A)   BUN 26 (A) 24 (A) 23 (A)   Creatinine 0.62 0.71 0.72   Sodium 135 (A) 138 137   Potassium 4.6 4.5 4.4   Chloride 88 (A) 91 (A) 92 (A)   Calcium 10.2 11.0 (A) 10.2   (A) Abnormal value       Comments are available for some flowsheets but are not being displayed.           CBC    CBC 12/1/22 12/2/22 12/3/22   WBC 9.60 8.36 7.63   RBC 3.92 4.04 4.02   Hemoglobin 12.4 12.7 12.6   Hematocrit 38.8 38.9 38.3   MCV 99.0 (A) 96.3 95.3   MCH 31.6 31.4 31.3   MCHC 32.0 32.6 32.9   RDW 12.3 12.3 12.0 (A)   Platelets 179 167 188   (A) Abnormal value            []  Microbiology:   []  Radiology:   []  EKG/Telemetry:   []  Cardiology/Vascular:    []  Pathology:  []  Old records:  []  Other:    Assessment & Plan   Assessment / Plan     Assessment:  Acute on chronic hypercarbic and hypoxemic respiratory failure requiring  NIPPV.  Patient has home oxygen  Acute COPD exacerbation  Human metapneumovirus pneumonia  Medical noncompliance  Obesity with BMI: 37.42  Type 2 diabetes mellitus with hyperglycemia  Anxiety  Contraction alkalosis  Hypertension  Pain right knee due to DJD.    Plan:  -Pulmonology/Critical Care consulted and following, appreciate assistance and recommendations in the care of this patient.  -Continue supplemental O2 to maintain sats greater than 90%, wean as tolerated  -Continue home trilogy at night and with naps and as needed throughout the day  -Continue Brovana, Pulmicort and duo nebs  -Continue Singulair  -Encouraged patient to continue using incentive spirometer  -No change insulin regimen.  Continue to monitor blood glucose level and SSI requirement and titrate accordingly.  -PT/OT consulted.  Patient encouraged to increase activity.  Out of bed to chair as tolerated.  I believe that with continued work with skilled therapy services patient's condition will improve and eventually patient would be able to return home.   -Continue appropriate home medications  -Voltaren gel for right knee  DVT Prophylaxis: Lovenox  Diet: Regular  Dispo: Social work making referrals  Code Status: Full code     Personally reviewed patients labs and imaging, discussed with patient and nurse at bedside.  Discussed with .  Await decision on appeal by [employer]     Part of this note may be an electronic transcription/translation of spoken language to printed text using the Dragon dictation system.    DVT prophylaxis:  Medical DVT prophylaxis orders are present.    CODE STATUS:   Code Status (Patient has no pulse and is not breathing): CPR (Attempt to Resuscitate)  Medical Interventions (Patient has pulse or is breathing): Full Support  Release to patient: Routine Release        Electronically signed by Rodrigo Espinosa MD, 12/07/22, 6:35 PM EST.

## 2022-12-07 NOTE — THERAPY TREATMENT NOTE
Acute Care - Physical Therapy Treatment Note  [facility]     Patient Name: Nova Manuel  : 1965  MRN: [id number]  Today's Date: 2022      Visit Dx:     ICD-10-CM ICD-9-CM   1. Acute on chronic respiratory failure with hypoxia and hypercapnia (HCC)  J96.21 518.84    J96.22 786.09     799.02   2. COPD with acute exacerbation (HCC)  J44.1 491.21   3. Oropharyngeal dysphagia  R13.12 787.22   4. Decreased activities of daily living (ADL)  Z78.9 V49.89   5. Difficulty walking  R26.2 719.7     Patient Active Problem List   Diagnosis   • Centrilobular emphysema (HCC)   • Chronic respiratory failure with hypoxia and hypercapnia (HCC)   • Acute on chronic respiratory failure with hypoxia and hypercapnia (HCC)   • COPD with acute exacerbation (HCC)   • Restrictive lung disease   • Allergic rhinitis   • Anemia, pernicious   • Anxiety   • Asthma   • Diabetes (HCC)   • Migraine   • Mitral valve prolapse   • Morbid obesity with body mass index of 40.0-44.9 in adult (HCC)     Past Medical History:   Diagnosis Date   • Anxiety    • Asthma    • CHF (congestive heart failure) (HCC)    • COPD (chronic obstructive pulmonary disease) (HCC)    • Diabetes mellitus (HCC)    • Disease of thyroid gland    • Hyperlipidemia    • Hypertension      Past Surgical History:   Procedure Laterality Date   •  SECTION     • CHOLECYSTECTOMY     • HYSTERECTOMY       PT Assessment (last 12 hours)     PT Evaluation and Treatment     Row Name 22 0936          Physical Therapy Time and Intention    Subjective Information complains of;weakness;fatigue;dyspnea  -DK     Document Type therapy note (daily note)  -DK     Mode of Treatment individual therapy;physical therapy  -DK     Patient Effort good  -DK     Symptoms Noted During/After Treatment fatigue;shortness of breath  -DK     Row Name 22 0936          Pain    Pretreatment Pain Rating 6/10  -DK     Posttreatment Pain Rating 6/10  -DK     Pain Location generalized  -DK      Pain Location - back  -DK     Pain Intervention(s) Repositioned;Distraction;Ambulation/increased activity;Therapeutic presence  -DK     Row Name 12/07/22 0936          Cognition    Affect/Mental Status (Cognition) WFL  -DK     Orientation Status (Cognition) oriented x 4  -DK     Follows Commands (Cognition) WFL  -DK     Cognitive Function WFL  -DK     Personal Safety Interventions gait belt;nonskid shoes/slippers when out of bed;supervised activity  -DK     Row Name 12/07/22 0936          Bed Mobility    Bed Mobility supine-sit  -DK     Supine-Sit Grace (Bed Mobility) standby assist  -DK     Assistive Device (Bed Mobility) bed rails  -DK     Row Name 12/07/22 0936          Transfers    Transfers sit-stand transfer;stand-sit transfer  -     Row Name 12/07/22 0936          Bed-Chair Transfer    Bed-Chair Grace (Transfers) standby assist;contact guard;1 person assist  -DK     Assistive Device (Bed-Chair Transfers) --  no assistive device  -     Row Name 12/07/22 0936          Sit-Stand Transfer    Sit-Stand Grace (Transfers) standby assist;contact guard;1 person assist  -DK     Assistive Device (Sit-Stand Transfers) --  no assistive device  -     Row Name 12/07/22 0936          Stand-Sit Transfer    Stand-Sit Grace (Transfers) standby assist;contact guard;1 person assist  -DK     Assistive Device (Stand-Sit Transfers) --  no assistive device  -     Row Name 12/07/22 0936          Gait/Stairs (Locomotion)    Gait/Stairs Locomotion gait/ambulation independence;gait/ambulation assistive device;distance ambulated;gait pattern  -DK     Grace Level (Gait) contact guard;1 person assist  -DK     Assistive Device (Gait) --  no assistive device  -DK     Distance in Feet (Gait) 5  -DK     Pattern (Gait) step-to  -DK     Deviations/Abnormal Patterns (Gait) festinating/shuffling;stride length decreased  -DK     Bilateral Gait Deviations forward flexed posture  -DK     Comment,  (Gait/Stairs) Pt stood and ambulated bed to chair with O2, no assistive device and assist x 1.  She was left in the recliner post treatment.  -     Row Name 12/07/22 0936          Safety Issues, Functional Mobility    Safety Issues Affecting Function (Mobility) safety precaution awareness  -     Impairments Affecting Function (Mobility) endurance/activity tolerance;pain;shortness of breath;strength  -     Row Name 12/07/22 0936          Balance    Balance Assessment sitting static balance;sitting dynamic balance;standing static balance;standing dynamic balance  -     Static Sitting Balance standby assist  -     Dynamic Sitting Balance standby assist  -DK     Position, Sitting Balance unsupported;sitting edge of bed;sitting in chair  -     Static Standing Balance standby assist;contact guard;1-person assist  -     Dynamic Standing Balance standby assist;contact guard;1-person assist  -DK     Position/Device Used, Standing Balance --  no assistive device  -     Balance Interventions standing;dynamic;tandem gait  -     Row Name 12/07/22 0936          Motor Skills    Motor Skills --  therapeutic exericses  -     Coordination WFL  -     Therapeutic Exercise hip;knee;ankle  -     Row Name 12/07/22 0936          Hip (Therapeutic Exercise)    Hip (Therapeutic Exercise) AAROM (active assistive range of motion)  -     Hip AAROM (Therapeutic Exercise) bilateral;flexion;extension;aBduction;aDduction;supine;10 repetitions;2 sets  -     Row Name 12/07/22 0936          Knee (Therapeutic Exercise)    Knee (Therapeutic Exercise) AAROM (active assistive range of motion)  -     Knee AAROM (Therapeutic Exercise) bilateral;flexion;extension;supine;10 repetitions;2 sets  -     Row Name 12/07/22 0936          Ankle (Therapeutic Exercise)    Ankle (Therapeutic Exercise) AAROM (active assistive range of motion)  -     Ankle AAROM (Therapeutic Exercise) bilateral;dorsiflexion;plantarflexion;supine;10  repetitions;2 sets  -DK     Row Name 12/07/22 0936          Plan of Care Review    Plan of Care Reviewed With patient  -DK     Progress improving  -DK     Row Name 12/07/22 0936          Positioning and Restraints    Pre-Treatment Position in bed  -DK     Post Treatment Position chair  -DK     In Chair reclined;call light within reach;encouraged to call for assist;legs elevated  -DK     Row Name 12/07/22 0936          Therapy Assessment/Plan (PT)    Rehab Potential (PT) good, to achieve stated therapy goals  -DK     Criteria for Skilled Interventions Met (PT) skilled treatment is necessary  -DK     Therapy Frequency (PT) daily  -DK     Problem List (PT) problems related to;balance;mobility;strength;pain  -DK     Activity Limitations Related to Problem List (PT) unable to ambulate safely;unable to transfer safely  -DK     Row Name 12/07/22 0936          Progress Summary (PT)    Progress Toward Functional Goals (PT) progress toward functional goals is good  -DK           User Key  (r) = Recorded By, (t) = Taken By, (c) = Cosigned By    Initials Name Provider Type    Brook Greene PTA Physical Therapist Assistant                Physical Therapy Education     Title: PT OT SLP Therapies (Done)     Topic: Physical Therapy (Done)     Point: Mobility training (Done)     Learning Progress Summary           Patient Acceptance, E, VU,DU by RF at 12/7/2022 0856    Acceptance, E, VU,DU by RF at 12/6/2022 0856    Acceptance, E, VU,DU by RF at 12/5/2022 0909    Acceptance, E,TB, VU,NR by EV at 12/4/2022 0018    Acceptance, E, VU,DU by RF at 12/2/2022 0838    Acceptance, E,TB, VU by AD at 11/29/2022 1311                   Point: Home exercise program (Done)     Learning Progress Summary           Patient Acceptance, E, VU,DU by RF at 12/7/2022 0856    Acceptance, E, VU,DU by RF at 12/6/2022 0856    Acceptance, E, VU,DU by RF at 12/5/2022 0909    Acceptance, E,TB, VU,NR by EV at 12/4/2022 0018    Acceptance, E, VU,DU by RF at  12/2/2022 0838    Acceptance, E,TB, VU by JR at 11/30/2022 1433                   Point: Body mechanics (Done)     Learning Progress Summary           Patient Acceptance, E, VU,DU by RF at 12/7/2022 0856    Acceptance, E, VU,DU by RF at 12/6/2022 0856    Acceptance, E, VU,DU by RF at 12/5/2022 0909    Acceptance, E,TB, VU,NR by EV at 12/4/2022 0018    Acceptance, E, VU,DU by RF at 12/2/2022 0838    Acceptance, E,TB, VU by AD at 11/29/2022 1311                   Point: Precautions (Done)     Learning Progress Summary           Patient Acceptance, E, VU,DU by RF at 12/7/2022 0856    Acceptance, E, VU,DU by RF at 12/6/2022 0856    Acceptance, E, VU,DU by RF at 12/5/2022 0909    Acceptance, E,TB, VU,NR by EV at 12/4/2022 0018    Acceptance, E, VU,DU by RF at 12/2/2022 0838    Acceptance, E,TB, VU by AD at 11/29/2022 1311                               User Key     Initials Effective Dates Name Provider Type Discipline    EV 06/16/21 -  Shell Landon, RN Registered Nurse Nurse     03/14/22 -  Margarita Lebron, RN Registered Nurse Nurse     01/19/22 -  Kalyan Smalls, RN Registered Nurse Nurse    AD 10/18/22 -  Jcarlos Min, OMK[employer] Student PT Student PT              PT Recommendation and Plan  Planned Therapy Interventions (PT): balance training, bed mobility training, gait training, home exercise program, strengthening, transfer training  Therapy Frequency (PT): daily  Progress Summary (PT)  Progress Toward Functional Goals (PT): progress toward functional goals is good  Plan of Care Reviewed With: patient  Progress: improving   Outcome Measures     Row Name 12/07/22 0936 12/06/22 0939 12/05/22 1212       How much help from another person do you currently need...    Turning from your back to your side while in flat bed without using bedrails? 4  -DK 4  -DK 4  -DK    Moving from lying on back to sitting on the side of a flat bed without bedrails? 4  -DK 4  -DK 4  -DK    Moving to and from a bed to a chair (including a  wheelchair)? 3  -DK 3  -DK 3  -DK    Standing up from a chair using your arms (e.g., wheelchair, bedside chair)? 3  -DK 3  -DK 3  -DK    Climbing 3-5 steps with a railing? 3  -DK 3  -DK 3  -DK    To walk in hospital room? 3  -DK 3  -DK 3  -DK    AM-PAC 6 Clicks Score (PT) 20  -DK 20  -DK 20  -DK       Functional Assessment    Outcome Measure Options AM-PAC 6 Clicks Basic Mobility (PT)  -DK AM-PAC 6 Clicks Basic Mobility (PT)  -DK AM-PAC 6 Clicks Basic Mobility (PT)  -DK          User Key  (r) = Recorded By, (t) = Taken By, (c) = Cosigned By    Initials Name Provider Type    Brook Greene PTA Physical Therapist Assistant                 Time Calculation:    PT Charges     Row Name 12/07/22 0940             Time Calculation    PT Received On 12/07/22  -DK      PT Goal Re-Cert Due Date 12/08/22  -DK         Timed Charges    20462 - PT Therapeutic Exercise Minutes 14  -DK      42790 - Gait Training Minutes  3  -DK      68289 - PT Therapeutic Activity Minutes 7  -DK         Total Minutes    Timed Charges Total Minutes 24  -DK       Total Minutes 24  -DK            User Key  (r) = Recorded By, (t) = Taken By, (c) = Cosigned By    Initials Name Provider Type    Brook Greene PTA Physical Therapist Assistant              Therapy Charges for Today     Code Description Service Date Service Provider Modifiers Qty    57940559023 HC PT THER PROC EA 15 MIN 12/6/2022 Brook Guerra, PTA GP 1    87359136532 HC PT THERAPEUTIC ACT EA 15 MIN 12/6/2022 Brook Guerra PTA GP 1    89352637609 HC PT THER PROC EA 15 MIN 12/7/2022 Brook Guerra, JOSH GP 1    73698899498 HC PT THERAPEUTIC ACT EA 15 MIN 12/7/2022 Brook Guerra, JOSH GP 1          PT G-Codes  Outcome Measure Options: AM-PAC 6 Clicks Basic Mobility (PT)  AM-PAC 6 Clicks Score (PT): 20  AM-PAC 6 Clicks Score (OT): 19    Brook Guerra PTA  12/7/2022

## 2022-12-08 LAB
GLUCOSE BLDC GLUCOMTR-MCNC: 116 MG/DL (ref 70–99)
GLUCOSE BLDC GLUCOMTR-MCNC: 140 MG/DL (ref 70–99)
GLUCOSE BLDC GLUCOMTR-MCNC: 151 MG/DL (ref 70–99)

## 2022-12-08 PROCEDURE — 63710000001 INSULIN LISPRO (HUMAN) PER 5 UNITS: Performed by: INTERNAL MEDICINE

## 2022-12-08 PROCEDURE — 97116 GAIT TRAINING THERAPY: CPT

## 2022-12-08 PROCEDURE — 94799 UNLISTED PULMONARY SVC/PX: CPT

## 2022-12-08 PROCEDURE — 82962 GLUCOSE BLOOD TEST: CPT

## 2022-12-08 PROCEDURE — 25010000002 ENOXAPARIN PER 10 MG: Performed by: FAMILY MEDICINE

## 2022-12-08 PROCEDURE — 99232 SBSQ HOSP IP/OBS MODERATE 35: CPT | Performed by: INTERNAL MEDICINE

## 2022-12-08 PROCEDURE — 94664 DEMO&/EVAL PT USE INHALER: CPT

## 2022-12-08 PROCEDURE — 94761 N-INVAS EAR/PLS OXIMETRY MLT: CPT

## 2022-12-08 PROCEDURE — 97110 THERAPEUTIC EXERCISES: CPT

## 2022-12-08 RX ADMIN — DIVALPROEX SODIUM 125 MG: 125 CAPSULE, COATED PELLETS ORAL at 19:42

## 2022-12-08 RX ADMIN — ALPRAZOLAM 0.5 MG: 0.25 TABLET ORAL at 04:13

## 2022-12-08 RX ADMIN — IPRATROPIUM BROMIDE AND ALBUTEROL SULFATE 3 ML: 2.5; .5 SOLUTION RESPIRATORY (INHALATION) at 07:16

## 2022-12-08 RX ADMIN — ALPRAZOLAM 0.5 MG: 0.25 TABLET ORAL at 09:34

## 2022-12-08 RX ADMIN — IPRATROPIUM BROMIDE AND ALBUTEROL SULFATE 3 ML: 2.5; .5 SOLUTION RESPIRATORY (INHALATION) at 12:17

## 2022-12-08 RX ADMIN — BUTALBITAL, ACETAMINOPHEN AND CAFFEINE 1 CAPSULE: 300; 40; 50 CAPSULE ORAL at 19:42

## 2022-12-08 RX ADMIN — SERTRALINE HYDROCHLORIDE 200 MG: 100 TABLET ORAL at 09:33

## 2022-12-08 RX ADMIN — ALPRAZOLAM 0.5 MG: 0.25 TABLET ORAL at 19:42

## 2022-12-08 RX ADMIN — DIVALPROEX SODIUM 125 MG: 125 CAPSULE, COATED PELLETS ORAL at 09:34

## 2022-12-08 RX ADMIN — DICLOFENAC SODIUM 2 G: 10 GEL TOPICAL at 07:54

## 2022-12-08 RX ADMIN — BUSPIRONE HYDROCHLORIDE 5 MG: 5 TABLET ORAL at 09:34

## 2022-12-08 RX ADMIN — LEVOTHYROXINE SODIUM 125 MCG: 125 TABLET ORAL at 05:44

## 2022-12-08 RX ADMIN — ARFORMOTEROL TARTRATE INHALATION 15 MCG: 15 SOLUTION RESPIRATORY (INHALATION) at 07:16

## 2022-12-08 RX ADMIN — BUDESONIDE 0.25 MG: 0.25 INHALANT ORAL at 07:16

## 2022-12-08 RX ADMIN — DICLOFENAC SODIUM 2 G: 10 GEL TOPICAL at 19:42

## 2022-12-08 RX ADMIN — IPRATROPIUM BROMIDE AND ALBUTEROL SULFATE 3 ML: 2.5; .5 SOLUTION RESPIRATORY (INHALATION) at 01:33

## 2022-12-08 RX ADMIN — ARFORMOTEROL TARTRATE INHALATION 15 MCG: 15 SOLUTION RESPIRATORY (INHALATION) at 19:14

## 2022-12-08 RX ADMIN — SPIRONOLACTONE 25 MG: 25 TABLET ORAL at 09:34

## 2022-12-08 RX ADMIN — ASPIRIN 81 MG CHEWABLE TABLET 81 MG: 81 TABLET CHEWABLE at 09:34

## 2022-12-08 RX ADMIN — ENOXAPARIN SODIUM 40 MG: 100 INJECTION SUBCUTANEOUS at 09:34

## 2022-12-08 RX ADMIN — DICLOFENAC SODIUM 2 G: 10 GEL TOPICAL at 11:38

## 2022-12-08 RX ADMIN — IPRATROPIUM BROMIDE AND ALBUTEROL SULFATE 3 ML: 2.5; .5 SOLUTION RESPIRATORY (INHALATION) at 23:53

## 2022-12-08 RX ADMIN — BUDESONIDE 0.25 MG: 0.25 INHALANT ORAL at 19:14

## 2022-12-08 RX ADMIN — IPRATROPIUM BROMIDE AND ALBUTEROL SULFATE 3 ML: 2.5; .5 SOLUTION RESPIRATORY (INHALATION) at 19:14

## 2022-12-08 RX ADMIN — INSULIN LISPRO 2 UNITS: 100 INJECTION, SOLUTION INTRAVENOUS; SUBCUTANEOUS at 12:15

## 2022-12-08 RX ADMIN — ATENOLOL 50 MG: 50 TABLET ORAL at 09:33

## 2022-12-08 RX ADMIN — MONTELUKAST 10 MG: 10 TABLET, FILM COATED ORAL at 09:34

## 2022-12-08 NOTE — PROGRESS NOTES
[facility]   Hospitalist Progress Note  Date: 2022  Patient Name: Nova Manuel  : 1965  MRN: [id number]  Date of admission: 2022  Consultants:   -Pulmonology/Critical Care: Dr. Donte Mcqueen, Dr. Cheryle Silverio, Dr. Kamran Marquez, Dr. Levi Infante, Dr. Kamran Marquez    Subjective   Subjective     Chief Complaint: Shortness of breath    Summary:   Nova Manuel is a 57 y.o. female with severe COPD (FEV1: 13%), chronic hypoxemic and hypercapnic respiratory failure on 2 L O2 via nasal cannula and nocturnal NIPPV who presented to the ED with complaints of worsening shortness of breath.  Upon evaluation in ED patient was in respiratory distress and started on NIPPV with improvement in symptoms.  CXR did not demonstrate acute infiltrate or effusion.  CT chest negative for PE.  Hospitalist service contacted for further evaluation management.  Pulmonology consulted.  Systemic steroids, and inhaled bronchodilators and empiric antibiotics initiated.  Respiratory panel returned positive for human metapneumovirus.  Patient continued on BiPAP and respiratory function with some improvement.  Given patient's COPD severity palliative care was consulted and decision made to continue with aggressive care measures.  Patient unable to tolerate much p.o. intake some core track was placed and nutrition initiated.  Patient's respiratory status has been improving and patient able to be weaned from BiPAP to Airvo and subsequently to nasal cannula and home trilogy at night.  Rehab referrals made by social work, pre-CERT pending at [facility].  She was denied by insurance to go to [facility].  [facility] filing appeal.    Interval Followup:   No acute events overnight.  Complaining of pain in right knee.  Has DJD and at times it hurts bad  Remains on 4 L with good oxygen saturation   patient states that her breathing has been stable.  She continues to use incentive spirometer throughout the day.  She is very pleased with  her progress.  She denies any chest pain, abdominal pain, nausea or vomiting.        Review of Systems   All systems reviewed and negative unless stated otherwise under subjective.    Objective   Objective     Vitals:   Temp:  [97.9 °F (36.6 °C)-98.9 °F (37.2 °C)] 98.1 °F (36.7 °C)  Heart Rate:  [63-83] 65  Resp:  [18-20] 18  BP: (105-130)/(49-60) 105/49  Flow (L/min):  [4] 4  Physical Exam   Gen: No acute distress, conversant, pleasant, sitting up in chair bedside  HEENT: MMM, Atraumatic  Neck: Supple, Trachea midline  Resp: CTAB, improved bilateral rhonchi, no increase in work of breathing, equal chest rise bilaterally, no conversational dyspnea noted  Card: RRR, No m/r/g  Abd: Soft, Nontender, Nondistended, + bowel sounds  Ext: No cyanosis, No clubbing.  Warm mildly tender right knee without redness.  Neuro: CN II-XII grossly intact, No focal deficits appreciated  Psych: AAO x 3, Normal mood, Normal affect    Result Review    Result Review:  I have personally reviewed the results as below and agree with these findings:  []  Laboratory:   CMP    CMP 12/1/22 12/2/22 12/3/22   Glucose 100 (A) 126 (A) 125 (A)   BUN 26 (A) 24 (A) 23 (A)   Creatinine 0.62 0.71 0.72   Sodium 135 (A) 138 137   Potassium 4.6 4.5 4.4   Chloride 88 (A) 91 (A) 92 (A)   Calcium 10.2 11.0 (A) 10.2   (A) Abnormal value       Comments are available for some flowsheets but are not being displayed.           CBC    CBC 12/1/22 12/2/22 12/3/22   WBC 9.60 8.36 7.63   RBC 3.92 4.04 4.02   Hemoglobin 12.4 12.7 12.6   Hematocrit 38.8 38.9 38.3   MCV 99.0 (A) 96.3 95.3   MCH 31.6 31.4 31.3   MCHC 32.0 32.6 32.9   RDW 12.3 12.3 12.0 (A)   Platelets 179 167 188   (A) Abnormal value            []  Microbiology:   []  Radiology:   []  EKG/Telemetry:   []  Cardiology/Vascular:    []  Pathology:  []  Old records:  []  Other:    Assessment & Plan   Assessment / Plan     Assessment:  Acute on chronic hypercarbic and hypoxemic respiratory failure requiring  NIPPV.  Patient has home oxygen  Acute COPD exacerbation  Human metapneumovirus pneumonia  Medical noncompliance  Obesity with BMI: 37.42  Type 2 diabetes mellitus with hyperglycemia  Anxiety  Contraction alkalosis  Hypertension  Pain right knee due to DJD.    Plan:  -Pulmonology/Critical Care consulted and following, appreciate assistance and recommendations in the care of this patient.  -Continue supplemental O2 to maintain sats greater than 90%, wean as tolerated  -Continue home trilogy at night and with naps and as needed throughout the day  -Continue Brovana, Pulmicort and duo nebs  -Continue Singulair  -Encouraged patient to continue using incentive spirometer  -No change insulin regimen.  Continue to monitor blood glucose level and SSI requirement and titrate accordingly.  -PT/OT consulted.  Patient encouraged to increase activity.  Out of bed to chair as tolerated.  I believe that with continued work with skilled therapy services patient's condition will improve and eventually patient would be able to return home.   -Continue appropriate home medications  -Voltaren gel for right knee  DVT Prophylaxis: Lovenox  Diet: Regular  Dispo: Social work making referrals  Code Status: Full code     Personally reviewed patients labs and imaging, discussed with patient and nurse at bedside.  Discussed with .  Await decision on appeal by [employer]     Part of this note may be an electronic transcription/translation of spoken language to printed text using the Dragon dictation system.    DVT prophylaxis:  Medical DVT prophylaxis orders are present.    CODE STATUS:   Code Status (Patient has no pulse and is not breathing): CPR (Attempt to Resuscitate)  Medical Interventions (Patient has pulse or is breathing): Full Support  Release to patient: Routine Release        Electronically signed by Rodrigo Espinosa MD, 12/08/22, 6:38 PM EST.  .

## 2022-12-08 NOTE — THERAPY TREATMENT NOTE
Acute Care - Physical Therapy Treatment Note  [facility]     Patient Name: Nova Manuel  : 1965  MRN: [id number]  Today's Date: 2022      Visit Dx:     ICD-10-CM ICD-9-CM   1. Acute on chronic respiratory failure with hypoxia and hypercapnia (HCC)  J96.21 518.84    J96.22 786.09     799.02   2. COPD with acute exacerbation (HCC)  J44.1 491.21   3. Oropharyngeal dysphagia  R13.12 787.22   4. Decreased activities of daily living (ADL)  Z78.9 V49.89   5. Difficulty walking  R26.2 719.7     Patient Active Problem List   Diagnosis   • Centrilobular emphysema (HCC)   • Chronic respiratory failure with hypoxia and hypercapnia (HCC)   • Acute on chronic respiratory failure with hypoxia and hypercapnia (HCC)   • COPD with acute exacerbation (HCC)   • Restrictive lung disease   • Allergic rhinitis   • Anemia, pernicious   • Anxiety   • Asthma   • Diabetes (HCC)   • Migraine   • Mitral valve prolapse   • Morbid obesity with body mass index of 40.0-44.9 in adult (HCC)     Past Medical History:   Diagnosis Date   • Anxiety    • Asthma    • CHF (congestive heart failure) (HCC)    • COPD (chronic obstructive pulmonary disease) (HCC)    • Diabetes mellitus (HCC)    • Disease of thyroid gland    • Hyperlipidemia    • Hypertension      Past Surgical History:   Procedure Laterality Date   •  SECTION     • CHOLECYSTECTOMY     • HYSTERECTOMY       PT Assessment (last 12 hours)     PT Evaluation and Treatment     Row Name 22 1219          Physical Therapy Time and Intention    Subjective Information no complaints (P)   -GT     Document Type therapy note (daily note) (P)   -GT     Mode of Treatment individual therapy;physical therapy (P)   -GT     Symptoms Noted During/After Treatment shortness of breath (P)   -GT     Row Name 22 1219          General Information    Patient Observations alert;cooperative;agree to therapy (P)   -GT     Row Name 22 1219          Transfers    Transfers sit-stand  transfer;stand-sit transfer (P)   -GT     Row Name 12/08/22 1219          Sit-Stand Transfer    Sit-Stand [place] (Transfers) contact guard (P)   -GT     Assistive Device (Sit-Stand Transfers) walker, front-wheeled (P)   -GT     Comment, (Sit-Stand Transfer) Patient completed  4x sit to stand between ambulation and standing therapeutic exercises. (P)   -GT     Row Name 12/08/22 1219          Stand-Sit Transfer    Stand-Sit [place] (Transfers) contact guard (P)   -GT     Assistive Device (Stand-Sit Transfers) walker, front-wheeled (P)   -GT     Row Name 12/08/22 1219          Gait/Stairs (Locomotion)    Gait/Stairs Locomotion gait/ambulation independence (P)   -GT     [place] Level (Gait) contact guard (P)   -GT     Distance in Feet (Gait) 20 (P)   -GT     Pattern (Gait) step-through (P)   -GT     Deviations/Abnormal Patterns (Gait) lilly decreased;gait speed decreased;stride length decreased (P)   -GT     Comment, (Gait/Stairs) Patient ambulated 5 feet from the chair and back x2 with rest in between for a total of 20 feet ambulated. (P)   -GT     Row Name 12/08/22 1219          Safety Issues, Functional Mobility    Impairments Affecting Function (Mobility) balance;endurance/activity tolerance;shortness of breath (P)   -GT     Row Name 12/08/22 1219          Hip (Therapeutic Exercise)    Hip (Therapeutic Exercise) strengthening exercise (P)   -GT     Hip Strengthening (Therapeutic Exercise) bilateral;aBduction;aDduction;marching while standing;2 sets;15 repititions (P)   -GT     Row Name 12/08/22 1219          Knee (Therapeutic Exercise)    Knee (Therapeutic Exercise) strengthening exercise (P)   -GT     Knee Strengthening (Therapeutic Exercise) bilateral;marching while standing;2 sets;15 repititions (P)   -GT     Row Name 12/08/22 1219          Ankle (Therapeutic Exercise)    Ankle (Therapeutic Exercise) strengthening exercise (P)   -GT     Ankle AROM (Therapeutic Exercise)  bilateral;standing;dorsiflexion;plantarflexion;10 repetitions (P)   -GT     Row Name 12/08/22 1219          Progress Summary (PT)    Progress Toward Functional Goals (PT) progress toward functional goals as expected (P)   -GT           User Key  (r) = Recorded By, (t) = Taken By, (c) = Cosigned By    Initials Name Provider Type    GT Sangeetha Munguia, PT Student PT Student                Physical Therapy Education     Title: PT OT SLP Therapies (Done)     Topic: Physical Therapy (Done)     Point: Mobility training (Done)     Learning Progress Summary           Patient Acceptance, E, VU,DU by RF at 12/7/2022 0856    Acceptance, E, VU,DU by RF at 12/6/2022 0856    Acceptance, E, VU,DU by RF at 12/5/2022 0909    Acceptance, E,TB, VU,NR by EV at 12/4/2022 0018    Acceptance, E, VU,DU by RF at 12/2/2022 0838    Acceptance, E,TB, VU by AD at 11/29/2022 1311                   Point: Home exercise program (Done)     Learning Progress Summary           Patient Acceptance, E, VU,DU by RF at 12/7/2022 0856    Acceptance, E, VU,DU by RF at 12/6/2022 0856    Acceptance, E, VU,DU by RF at 12/5/2022 0909    Acceptance, E,TB, VU,NR by EV at 12/4/2022 0018    Acceptance, E, VU,DU by RF at 12/2/2022 0838    Acceptance, E,TB, VU by JR at 11/30/2022 1433                   Point: Body mechanics (Done)     Learning Progress Summary           Patient Acceptance, E, VU,DU by RF at 12/7/2022 0856    Acceptance, E, VU,DU by RF at 12/6/2022 0856    Acceptance, E, VU,DU by RF at 12/5/2022 0909    Acceptance, E,TB, VU,NR by EV at 12/4/2022 0018    Acceptance, E, VU,DU by RF at 12/2/2022 0838    Acceptance, E,TB, VU by AD at 11/29/2022 1311                   Point: Precautions (Done)     Learning Progress Summary           Patient Acceptance, E, VU,DU by RF at 12/7/2022 0856    Acceptance, E, VU,DU by RF at 12/6/2022 0856    Acceptance, E, VU,DU by RF at 12/5/2022 0909    Acceptance, E,TB, VU,NR by EV at 12/4/2022 0018    Acceptance, E,  VU,DU by RF at 12/2/2022 0838    Acceptance, E,TB, VU by AD at 11/29/2022 1311                               User Key     Initials Effective Dates Name Provider Type Discipline    EV 06/16/21 -  Shell Landon, RN Registered Nurse Nurse    JR 03/14/22 -  Margarita Lebron, RN Registered Nurse Nurse    RF 01/19/22 -  Kalyan Smalls, RN Registered Nurse Nurse    AD 10/18/22 -  Jcarlos Min, PT Student PT Student PT              PT Recommendation and Plan     Progress Summary (PT)  Progress Toward Functional Goals (PT): (P) progress toward functional goals as expected   Outcome Measures     Row Name 12/08/22 1300 12/07/22 0936 12/06/22 0939       How much help from another person do you currently need...    Turning from your back to your side while in flat bed without using bedrails? 4 (P)   -GT 4  -DK 4  -DK    Moving from lying on back to sitting on the side of a flat bed without bedrails? 4 (P)   -GT 4  -DK 4  -DK    Moving to and from a bed to a chair (including a wheelchair)? 3 (P)   -GT 3  -DK 3  -DK    Standing up from a chair using your arms (e.g., wheelchair, bedside chair)? 3 (P)   -GT 3  -DK 3  -DK    Climbing 3-5 steps with a railing? 3 (P)   -GT 3  -DK 3  -DK    To walk in hospital room? 3 (P)   -GT 3  -DK 3  -DK    AM-PAC 6 Clicks Score (PT) 20 (P)   -GT 20  -DK 20  -DK       Functional Assessment    Outcome Measure Options AM-PAC 6 Clicks Basic Mobility (PT) (P)   -GT AM-PAC 6 Clicks Basic Mobility (PT)  -DK AM-PAC 6 Clicks Basic Mobility (PT)  -DK          User Key  (r) = Recorded By, (t) = Taken By, (c) = Cosigned By    Initials Name Provider Type    [place], PTA Physical Therapist Assistant    GT Sangeetha Munguia, PT Student PT Student                 Time Calculation:    PT Charges     Row Name 12/08/22 1306             Time Calculation    PT Received On 12/08/22 (P)   -GT         Timed Charges    40967 - PT Therapeutic Exercise Minutes 15 (P)   -GT      63807 - Gait Training  Minutes  5 (P)   -GT      21257 - PT Therapeutic Activity Minutes 3 (P)   -GT         Total Minutes    Timed Charges Total Minutes 23 (P)   -GT       Total Minutes 23 (P)   -GT            User Key  (r) = Recorded By, (t) = Taken By, (c) = Cosigned By    Initials Name Provider Type    GT Sangeetha Munguia, PT Student PT Student              Therapy Charges for Today     Code Description Service Date Service Provider Modifiers Qty    38570884728 HC PT THER PROC EA 15 MIN 12/8/2022 Sangeetha Munguia, PT Student GP 1    70001316406 HC GAIT TRAINING EA 15 MIN 12/8/2022 Sangeetha Munguia, PT Student GP 1          PT G-Codes  Outcome Measure Options: (P) AM-PAC 6 Clicks Basic Mobility (PT)  AM-PAC 6 Clicks Score (PT): (P) 20  AM-PAC 6 Clicks Score (OT): 19    Sangeetha Munguia PT Student  12/8/2022

## 2022-12-08 NOTE — PLAN OF CARE
Goal Outcome Evaluation:              Outcome Evaluation: A&Ox4. Anxiety medications given. Pt is still on 4 liters, sats in low to mid 90's. Pt has been a 1 assist to chair due to generalized weakness. VS WNL.

## 2022-12-09 LAB
GLUCOSE BLDC GLUCOMTR-MCNC: 112 MG/DL (ref 70–99)
GLUCOSE BLDC GLUCOMTR-MCNC: 120 MG/DL (ref 70–99)
GLUCOSE BLDC GLUCOMTR-MCNC: 140 MG/DL (ref 70–99)

## 2022-12-09 PROCEDURE — 94799 UNLISTED PULMONARY SVC/PX: CPT

## 2022-12-09 PROCEDURE — 25010000002 ONDANSETRON PER 1 MG: Performed by: INTERNAL MEDICINE

## 2022-12-09 PROCEDURE — 99232 SBSQ HOSP IP/OBS MODERATE 35: CPT | Performed by: INTERNAL MEDICINE

## 2022-12-09 PROCEDURE — 97168 OT RE-EVAL EST PLAN CARE: CPT

## 2022-12-09 PROCEDURE — 25010000002 ENOXAPARIN PER 10 MG: Performed by: FAMILY MEDICINE

## 2022-12-09 PROCEDURE — 82962 GLUCOSE BLOOD TEST: CPT

## 2022-12-09 PROCEDURE — 94761 N-INVAS EAR/PLS OXIMETRY MLT: CPT

## 2022-12-09 RX ADMIN — IPRATROPIUM BROMIDE AND ALBUTEROL SULFATE 3 ML: 2.5; .5 SOLUTION RESPIRATORY (INHALATION) at 12:45

## 2022-12-09 RX ADMIN — BUTALBITAL, ACETAMINOPHEN AND CAFFEINE 1 CAPSULE: 300; 40; 50 CAPSULE ORAL at 12:33

## 2022-12-09 RX ADMIN — SPIRONOLACTONE 25 MG: 25 TABLET ORAL at 10:45

## 2022-12-09 RX ADMIN — ALPRAZOLAM 0.5 MG: 0.25 TABLET ORAL at 02:52

## 2022-12-09 RX ADMIN — SERTRALINE HYDROCHLORIDE 200 MG: 100 TABLET ORAL at 10:44

## 2022-12-09 RX ADMIN — DICLOFENAC SODIUM 2 G: 10 GEL TOPICAL at 10:47

## 2022-12-09 RX ADMIN — ARFORMOTEROL TARTRATE INHALATION 15 MCG: 15 SOLUTION RESPIRATORY (INHALATION) at 18:04

## 2022-12-09 RX ADMIN — ATENOLOL 50 MG: 50 TABLET ORAL at 12:24

## 2022-12-09 RX ADMIN — BUTALBITAL, ACETAMINOPHEN AND CAFFEINE 1 CAPSULE: 300; 40; 50 CAPSULE ORAL at 20:26

## 2022-12-09 RX ADMIN — MONTELUKAST 10 MG: 10 TABLET, FILM COATED ORAL at 10:44

## 2022-12-09 RX ADMIN — ASPIRIN 81 MG CHEWABLE TABLET 81 MG: 81 TABLET CHEWABLE at 10:44

## 2022-12-09 RX ADMIN — IPRATROPIUM BROMIDE AND ALBUTEROL SULFATE 3 ML: 2.5; .5 SOLUTION RESPIRATORY (INHALATION) at 07:08

## 2022-12-09 RX ADMIN — DICLOFENAC SODIUM 2 G: 10 GEL TOPICAL at 17:43

## 2022-12-09 RX ADMIN — IPRATROPIUM BROMIDE AND ALBUTEROL SULFATE 3 ML: 2.5; .5 SOLUTION RESPIRATORY (INHALATION) at 18:04

## 2022-12-09 RX ADMIN — ENOXAPARIN SODIUM 40 MG: 100 INJECTION SUBCUTANEOUS at 10:44

## 2022-12-09 RX ADMIN — ARFORMOTEROL TARTRATE INHALATION 15 MCG: 15 SOLUTION RESPIRATORY (INHALATION) at 07:08

## 2022-12-09 RX ADMIN — BUDESONIDE 0.25 MG: 0.25 INHALANT ORAL at 18:04

## 2022-12-09 RX ADMIN — DICLOFENAC SODIUM 2 G: 10 GEL TOPICAL at 12:33

## 2022-12-09 RX ADMIN — DICLOFENAC SODIUM 2 G: 10 GEL TOPICAL at 20:27

## 2022-12-09 RX ADMIN — ALPRAZOLAM 0.5 MG: 0.25 TABLET ORAL at 10:44

## 2022-12-09 RX ADMIN — ONDANSETRON 4 MG: 2 INJECTION INTRAMUSCULAR; INTRAVENOUS at 17:38

## 2022-12-09 RX ADMIN — LEVOTHYROXINE SODIUM 125 MCG: 125 TABLET ORAL at 06:23

## 2022-12-09 RX ADMIN — BUDESONIDE 0.25 MG: 0.25 INHALANT ORAL at 07:08

## 2022-12-09 RX ADMIN — DIVALPROEX SODIUM 125 MG: 125 CAPSULE, COATED PELLETS ORAL at 20:26

## 2022-12-09 RX ADMIN — DIVALPROEX SODIUM 125 MG: 125 CAPSULE, COATED PELLETS ORAL at 10:44

## 2022-12-09 RX ADMIN — Medication 10 ML: at 10:44

## 2022-12-09 RX ADMIN — ALPRAZOLAM 0.5 MG: 0.25 TABLET ORAL at 17:38

## 2022-12-09 NOTE — PROGRESS NOTES
[facility]   Hospitalist Progress Note  Date: 2022  Patient Name: Nova Manuel  : 1965  MRN: [id number]  Date of admission: 2022  Consultants:   -Pulmonology/Critical Care: Dr. Donte Mcqueen, Dr. Cheryle Silverio, Dr. Kamran Marquez, Dr. Levi Infante, Dr. Kamran Marquez    Subjective   Subjective     Chief Complaint: Shortness of breath    Summary:   Nova Manuel is a 57 y.o. female with severe COPD (FEV1: 13%), chronic hypoxemic and hypercapnic respiratory failure on 2 L O2 via nasal cannula and nocturnal NIPPV who presented to the ED with complaints of worsening shortness of breath.  Upon evaluation in ED patient was in respiratory distress and started on NIPPV with improvement in symptoms.  CXR did not demonstrate acute infiltrate or effusion.  CT chest negative for PE.  Hospitalist service contacted for further evaluation management.  Pulmonology consulted.  Systemic steroids, and inhaled bronchodilators and empiric antibiotics initiated.  Respiratory panel returned positive for human metapneumovirus.  Patient continued on BiPAP and respiratory function with some improvement.  Given patient's COPD severity palliative care was consulted and decision made to continue with aggressive care measures.  Patient unable to tolerate much p.o. intake some core track was placed and nutrition initiated.  Patient's respiratory status has been improving and patient able to be weaned from BiPAP to Airvo and subsequently to nasal cannula and home trilogy at night.  Rehab referrals made by social work, pre-CERT pending at [facility].  She was denied by insurance to go to [facility].  [facility] filing appeal.    Interval Followup:   No acute events overnight.  Complaining of pain in right knee.  Has DJD and at times it hurts bad.  Voltaren gel helping  Remains on 4 L with good oxygen saturation   patient states that her breathing has been stable.  She continues to use incentive spirometer throughout the day.   She is very pleased with her progress.  She denies any chest pain, abdominal pain, nausea or vomiting.    Still very weak and in need of rehab as per reevaluation by PT.    Review of Systems   All systems reviewed and negative unless stated otherwise under subjective.    Objective   Objective     Vitals:   Temp:  [98.1 °F (36.7 °C)-98.7 °F (37.1 °C)] 98.1 °F (36.7 °C)  Heart Rate:  [59-82] 70  Resp:  [18-20] 18  BP: (102-145)/(44-58) 108/51  Flow (L/min):  [3-4] 3  Physical Exam   Gen: No acute distress, conversant, pleasant, sitting up in chair bedside  HEENT: MMM, Atraumatic  Neck: Supple, Trachea midline  Resp: CTAB, improved bilateral rhonchi, no increase in work of breathing, equal chest rise bilaterally, no conversational dyspnea noted  Card: RRR, No m/r/g  Abd: Soft, Nontender, Nondistended, + bowel sounds  Ext: No cyanosis, No clubbing.  Warm mildly tender right knee without redness.  Neuro: CN II-XII grossly intact, No focal deficits appreciated  Psych: AAO x 3, Normal mood, Normal affect    Result Review    Result Review:  I have personally reviewed the results as below and agree with these findings:  []  Laboratory:   CMP    CMP 12/1/22 12/2/22 12/3/22   Glucose 100 (A) 126 (A) 125 (A)   BUN 26 (A) 24 (A) 23 (A)   Creatinine 0.62 0.71 0.72   Sodium 135 (A) 138 137   Potassium 4.6 4.5 4.4   Chloride 88 (A) 91 (A) 92 (A)   Calcium 10.2 11.0 (A) 10.2   (A) Abnormal value       Comments are available for some flowsheets but are not being displayed.           CBC    CBC 12/1/22 12/2/22 12/3/22   WBC 9.60 8.36 7.63   RBC 3.92 4.04 4.02   Hemoglobin 12.4 12.7 12.6   Hematocrit 38.8 38.9 38.3   MCV 99.0 (A) 96.3 95.3   MCH 31.6 31.4 31.3   MCHC 32.0 32.6 32.9   RDW 12.3 12.3 12.0 (A)   Platelets 179 167 188   (A) Abnormal value            []  Microbiology:   []  Radiology:   []  EKG/Telemetry:   []  Cardiology/Vascular:    []  Pathology:  []  Old records:  []  Other:    Assessment & Plan   Assessment / Plan      Assessment:  Acute on chronic hypercarbic and hypoxemic respiratory failure requiring NIPPV.  Patient has home oxygen  Acute COPD exacerbation  Human metapneumovirus pneumonia  Medical noncompliance  Obesity with BMI: 37.42  Type 2 diabetes mellitus with hyperglycemia  Anxiety  Contraction alkalosis  Hypertension  Pain right knee due to DJD.    Plan:  -Pulmonology/Critical Care consulted and following, appreciate assistance and recommendations in the care of this patient.  -Continue supplemental O2 to maintain sats greater than 90%, wean as tolerated  -Continue home trilogy at night and with naps and as needed throughout the day  -Continue Brovana, Pulmicort and duo nebs  -Continue Singulair  -Encouraged patient to continue using incentive spirometer  -No change insulin regimen.  Continue to monitor blood glucose level and SSI requirement and titrate accordingly.  -PT/OT consulted.  Patient encouraged to increase activity.  Out of bed to chair as tolerated.  I believe that with continued work with skilled therapy services patient's condition will improve and eventually patient would be able to return home.   -Continue appropriate home medications  -Voltaren gel for right knee  DVT Prophylaxis: Lovenox  Diet: Regular  Dispo: Social work making referrals  Code Status: Full code     Personally reviewed patients labs and imaging, discussed with patient and nurse at bedside.  Discussed with .  Await decision on appeal by [employer]     Part of this note may be an electronic transcription/translation of spoken language to printed text using the Dragon dictation system.    DVT prophylaxis:  Medical DVT prophylaxis orders are present.    CODE STATUS:   Code Status (Patient has no pulse and is not breathing): CPR (Attempt to Resuscitate)  Medical Interventions (Patient has pulse or is breathing): Full Support  Release to patient: Routine Release          Electronically signed by Rodrigo Espinosa MD, 12/09/22,  6:11 PM EST.  .  .

## 2022-12-09 NOTE — THERAPY RE-EVALUATION
Patient Name: Noav Manuel  : 1965    MRN: [id number]                              Today's Date: 2022       Admit Date: 2022    Visit Dx:     ICD-10-CM ICD-9-CM   1. Acute on chronic respiratory failure with hypoxia and hypercapnia (HCC)  J96.21 518.84    J96.22 786.09     799.02   2. COPD with acute exacerbation (HCC)  J44.1 491.21   3. Oropharyngeal dysphagia  R13.12 787.22   4. Decreased activities of daily living (ADL)  Z78.9 V49.89   5. Difficulty walking  R26.2 719.7     Patient Active Problem List   Diagnosis   • Centrilobular emphysema (HCC)   • Chronic respiratory failure with hypoxia and hypercapnia (HCC)   • Acute on chronic respiratory failure with hypoxia and hypercapnia (HCC)   • COPD with acute exacerbation (HCC)   • Restrictive lung disease   • Allergic rhinitis   • Anemia, pernicious   • Anxiety   • Asthma   • Diabetes (HCC)   • Migraine   • Mitral valve prolapse   • Morbid obesity with body mass index of 40.0-44.9 in adult (HCC)     Past Medical History:   Diagnosis Date   • Anxiety    • Asthma    • CHF (congestive heart failure) (HCC)    • COPD (chronic obstructive pulmonary disease) (HCC)    • Diabetes mellitus (HCC)    • Disease of thyroid gland    • Hyperlipidemia    • Hypertension      Past Surgical History:   Procedure Laterality Date   •  SECTION     • CHOLECYSTECTOMY     • HYSTERECTOMY        General Information     Row Name 22          OT Time and Intention    Document Type re-evaluation  -EG     Mode of Treatment individual therapy;occupational therapy  -EG     Row Name 22          General Information    Patient Profile Reviewed yes  -EG     Existing Precautions/Restrictions oxygen therapy device and L/min  -EG     Barriers to Rehab none identified  -EG     Row Name 22          Occupational Profile    Reason for Services/Referral (Occupational Profile) Patient is a 57 year old femal admitted to [facility] for shortness of breath on  "Nov. 18th 2022. Occupational therapy consulted originally due to recent decline in ADL'/functional transfers. Reeval for current condition.  -EG     Patient Goals (Occupational Profile) \"Get out of this hospital to [facility]\"  -EG     Row Name 12/09/22 0929          Living Environment    People in Home child(liz), adult  -EG     Name(s) of People in Home Son  -EG     Row Name 12/09/22 0929          Home Main Entrance    Number of Stairs, Main Entrance none  -EG     Row Name 12/09/22 0929          Cognition    Orientation Status (Cognition) oriented x 4  -EG     Row Name 12/09/22 0929          Safety Issues, Functional Mobility    Safety Issues Affecting Function (Mobility) safety precaution awareness  -EG     Impairments Affecting Function (Mobility) balance;endurance/activity tolerance;shortness of breath  -EG           User Key  (r) = Recorded By, (t) = Taken By, (c) = Cosigned By    Initials Name Provider Type    EG Tamra Goodwin OT Occupational Therapist                 Mobility/ADL's     Row Name 12/09/22 0931          Bed Mobility    Bed Mobility supine-sit  -EG     Supine-Sit [place] (Bed Mobility) standby assist  -EG     Bed Mobility, Safety Issues decreased use of arms for pushing/pulling;decreased use of legs for bridging/pushing  -EG     Assistive Device (Bed Mobility) bed rails  -EG     Comment, (Bed Mobility) Extended time for performance and long rest breaks after performance due to SOB and O2 sats dropping low via NC  -EG     Row Name 12/09/22 0931          Transfers    Transfers sit-stand transfer;stand-sit transfer;bed-chair transfer;toilet transfer  -EG     Comment, (Transfers) CGA for all transfers with use of rolling walker; prolonged time and extended rest breaks to perform all tasks with O2 donned continously, drops as low as 83%  -EG     Row Name 12/09/22 0931          Bed-Chair Transfer    Bed-Chair Camilla (Transfers) standby assist;contact guard;1 person assist  -EG     " Assistive Device (Bed-Chair Transfers) walker, front-wheeled  -EG     Comment, (Bed-Chair Transfer) need for BUE support from walker for balance and safety  -EG     Row Name 12/09/22 0931          Sit-Stand Transfer    Sit-Stand [place] (Transfers) contact guard  -EG     Assistive Device (Sit-Stand Transfers) walker, front-wheeled  -EG     Row Name 12/09/22 0931          Stand-Sit Transfer    Stand-Sit [place] (Transfers) contact guard  -EG     Assistive Device (Stand-Sit Transfers) walker, front-wheeled  -EG     Row Name 12/09/22 0931          Toilet Transfer    Type (Toilet Transfer) sit-stand;stand-sit;stand pivot/stand step  -EG     [place] Level (Toilet Transfer) standby assist;contact guard;1 person assist  -EG     Assistive Device (Toilet Transfer) commode, bedside without drop arms  -EG     Comment, (Toilet Transfer) cueing for safety and hand placements  -EG     Row Name 12/09/22 0931          Functional Mobility    Functional Mobility- Comment CGA with use of rolling walker; limited tolerance and endurance for mobility for 10ft with sats dropping 83%  -EG     Row Name 12/09/22 0931          Bathing Assessment/Intervention    [place] Level (Bathing) bathing skills;upper body;standby assist;lower body;minimum assist (75% patient effort)  -EG     Comment, (Bathing) extended time to perform sponge bath due to SOB and poor task tolerance; sats drop frequently  -EG     Row Name 12/09/22 0931          Upper Body Dressing Assessment/Training    [place] Level (Upper Body Dressing) upper body dressing skills;standby assist  -EG     Row Name 12/09/22 0931          Lower Body Dressing Assessment/Training    [place] Level (Lower Body Dressing) lower body dressing skills;don;socks;minimum assist (75% patient effort)  -EG     Row Name 12/09/22 0931          Grooming Assessment/Training    [place] Level (Grooming) set up;grooming skills  -EG     Row Name 12/09/22 0931           Self-Feeding Assessment/Training    [place] Level (Feeding) feeding skills;set up  -EG     Row Name 12/09/22 0931          Toileting Assessment/Training    [place] Level (Toileting) adjust/manage clothing;toileting skills;perform perineal hygiene  -EG     Comment, (Toileting) CGA/Rafiq with extended time to perform due to SOB and fatigues needing prolonged rest breaks due to O2 sats dropping  -EG           User Key  (r) = Recorded By, (t) = Taken By, (c) = Cosigned By    Initials Name Provider Type    EG Tamra Goodwin OT Occupational Therapist               Obj/Interventions     Row Name 12/09/22 0937          Sensory Assessment (Somatosensory)    Sensory Assessment (Somatosensory) sensation intact  -EG     [facility] [place] [facility] Name 12/09/22 0937          Vision Assessment/Intervention    Visual Impairment/Limitations WFL  -EG     [facility] Name 12/09/22 0937          Range of Motion Comprehensive    General Range of Motion bilateral upper extremity ROM WFL  -EG     [facility] Name 12/09/22 0937          Strength Comprehensive (MMT)    Comment, General Manual Muscle Testing (MMT) Assessment 4-/5 BUE's grossly  -EG     [facility] Name 12/09/22 0937          Motor Skills    Motor Skills coordination;functional endurance  -EG     Coordination WFL  -EG     Functional Endurance fair-/Poor+ prolonged rest breaks and frequent rest breaks required; O2 sats dropping frequently with functional task performance  -EG     Row Name 12/09/22 0937          Balance    Balance Assessment sit to stand dynamic balance;standing static balance;sitting dynamic balance  -EG     Static Sitting Balance supervision  -EG     Dynamic Sitting Balance supervision  -EG     Sit to Stand Dynamic Balance contact guard;minimal assist  -EG     Static Standing Balance contact guard  -EG     Position/Device Used, Standing Balance walker, rolling  -EG     Balance Interventions standing;sit to stand;supported;sitting  -EG           User Key  (r) = Recorded By, (t) = Taken By, (c)  = Cosigned By    Initials Name Provider Type    EG Tamra Goodwin, OT Occupational Therapist               Goals/Plan     Row Name 12/09/22 0942          Bed Mobility Goal 1 (OT)    Activity/Assistive Device (Bed Mobility Goal 1, OT) bed mobility activities, all  -EG     [place] Level/Cues Needed (Bed Mobility Goal 1, OT) modified independence  -EG     Time Frame (Bed Mobility Goal 1, OT) long term goal (LTG);10 days  -EG     Progress/Outcomes (Bed Mobility Goal 1, OT) good progress toward goal  -EG     Row Name 12/09/22 0942          Transfer Goal 1 (OT)    Activity/Assistive Device (Transfer Goal 1, OT) transfers, all;walker, rolling  -EG     [place] Level/Cues Needed (Transfer Goal 1, OT) modified independence  -EG     Progress/Outcome (Transfer Goal 1, OT) good progress toward goal  -EG     Row Name 12/09/22 0942          Bathing Goal 1 (OT)    Activity/Device (Bathing Goal 1, OT) bathing skills, all;lower body bathing  -EG     [place] Level/Cues Needed (Bathing Goal 1, OT) minimum assist (75% or more patient effort)  -EG     Time Frame (Bathing Goal 1, OT) long term goal (LTG);10 days  -EG     Progress/Outcomes (Bathing Goal 1, OT) good progress toward goal  -EG     Row Name 12/09/22 0942          Dressing Goal 1 (OT)    Activity/Device (Dressing Goal 1, OT) dressing skills, all;lower body dressing  -EG     [place]/Cues Needed (Dressing Goal 1, OT) modified independence  -EG     Time Frame (Dressing Goal 1, OT) long term goal (LTG);10 days  -EG     Progress/Outcome (Dressing Goal 1, OT) goal revised this date  -EG     Row Name 12/09/22 0942          Toileting Goal 1 (OT)    Activity/Device (Toileting Goal 1, OT) toileting skills, all  -EG     [place] Level/Cues Needed (Toileting Goal 1, OT) modified independence  -EG     Time Frame (Toileting Goal 1, OT) long term goal (LTG);10 days  -EG     Progress/Outcome (Toileting Goal 1, OT) goal revised this date  -EG     Row Name 12/09/22 0942           Grooming Goal 1 (OT)    Activity/Device (Grooming Goal 1, OT) grooming skills, all  -EG     [place] (Grooming Goal 1, OT) modified independence  -EG     Time Frame (Grooming Goal 1, OT) long term goal (LTG);10 days  -EG     Progress/Outcome (Grooming Goal 1, OT) goal revised this date  -EG     Row Name 12/09/22 0942          Strength Goal 1 (OT)    Strength Goal 1 (OT) Patient will demonstrate 4/5 BUE strength to support ADLs/functional transfers.  -EG     Time Frame (Strength Goal 1, OT) long term goal (LTG);10 days  -EG     Progress/Outcome (Strength Goal 1, OT) good progress toward goal  -EG     Row Name 12/09/22 0942          Problem Specific Goal 1 (OT)    Problem Specific Goal 1 (OT) Patient will demonstrate fair endurance to support ADLs/functional transfers.  -EG     Time Frame (Problem Specific Goal 1, OT) long term goal (LTG);10 days  -EG     Progress/Outcome (Problem Specific Goal 1, OT) good progress toward goal  -EG     Row Name 12/09/22 0942          Therapy Assessment/Plan (OT)    Planned Therapy Interventions (OT) activity tolerance training;functional balance retraining;occupation/activity based interventions;strengthening exercise;transfer/mobility retraining;patient/caregiver education/training;adaptive equipment training;BADL retraining;neuromuscular control/coordination retraining  -EG           User Key  (r) = Recorded By, (t) = Taken By, (c) = Cosigned By    Initials Name Provider Type    EG Tamra Goodwin, OT Occupational Therapist               Clinical Impression     Row Name 12/09/22 0939          Pain Assessment    Pretreatment Pain Rating 2/10  -EG     Posttreatment Pain Rating 4/10  -EG     Pain Location generalized  -EG     Pain Location - back  -EG     Row Name 12/09/22 0939          Plan of Care Review    Plan of Care Reviewed With patient  -EG     Progress improving  -EG     Outcome Evaluation A&Ox4; cooperative and participatory; Patient is highly limited due to  deconditioning inconsistently needing 4L O2 still for activity participation; Patient displays limited task tolerance, balance, strength and safety requiring need for contiued skilled OT services to facilitate return to PLOF.  -EG     Row Name 12/09/22 0939          Therapy Assessment/Plan (OT)    Rehab Potential (OT) good, to achieve stated therapy goals  -EG     Criteria for Skilled Therapeutic Interventions Met (OT) yes;meets criteria;skilled treatment is necessary  -EG     Therapy Frequency (OT) 5 times/wk  -EG     Row Name 12/09/22 0939          Therapy Plan Review/Discharge Plan (OT)    Equipment Needs Upon Discharge (OT) walker, rolling;tub bench;commode chair  -EG     Anticipated Discharge Disposition (OT) inpatient rehabilitation facility  Wants to go to [facility]  -EG           User Key  (r) = Recorded By, (t) = Taken By, (c) = Cosigned By    Initials Name Provider Type    EG Tamra Goodwin, SHIRA Occupational Therapist               Outcome Measures     Row Name 12/09/22 0943          How much help from another is currently needed...    Putting on and taking off regular lower body clothing? 3  -EG     Bathing (including washing, rinsing, and drying) 3  -EG     Toileting (which includes using toilet bed pan or urinal) 3  -EG     Putting on and taking off regular upper body clothing 4  -EG     Taking care of personal grooming (such as brushing teeth) 4  -EG     Eating meals 4  -EG     AM-PAC 6 Clicks Score (OT) 21  -EG     Row Name 12/08/22 2300          How much help from another person do you currently need...    Turning from your back to your side while in flat bed without using bedrails? 4  -MC     Moving from lying on back to sitting on the side of a flat bed without bedrails? 4  -MC     Moving to and from a bed to a chair (including a wheelchair)? 3  -MC     Standing up from a chair using your arms (e.g., wheelchair, bedside chair)? 3  -MC     Climbing 3-5 steps with a railing? 3  -MC     To walk in  hospital room? 3  -MC     AM-PAC 6 Clicks Score (PT) 20  -     Highest level of mobility 6 --> Walked 10 steps or more  -     Row Name 12/09/22 0943          Functional Assessment    Outcome Measure Options AM-PAC 6 Clicks Daily Activity (OT);Optimal Instrument  -EG     Row Name 12/09/22 0943          Optimal Instrument    Bending/Stooping 3  -EG     Standing 2  -EG     Reaching 2  -EG           User Key  (r) = Recorded By, (t) = Taken By, (c) = Cosigned By    Initials Name Provider Type    Vinay Ledesma, RN Registered Nurse    Tamra Godinez OT Occupational Therapist                Occupational Therapy Education     Title: PT OT SLP Therapies (Done)     Topic: Occupational Therapy (Done)     Point: ADL training (Done)     Description:   Instruct learner(s) on proper safety adaptation and remediation techniques during self care or transfers.   Instruct in proper use of assistive devices.              Learning Progress Summary           Patient Acceptance, E, VU,DU by RF at 12/7/2022 0856    Acceptance, E, VU,DU by RF at 12/6/2022 0856    Acceptance, E, VU,DU by RF at 12/5/2022 0909    Acceptance, E,TB, VU,NR by EV at 12/4/2022 0018    Acceptance, E, VU,DU by RF at 12/2/2022 0838    Acceptance, E,TB, VU by LF at 11/29/2022 1123                   Point: Precautions (Done)     Description:   Instruct learner(s) on prescribed precautions during self-care and functional transfers.              Learning Progress Summary           Patient Acceptance, E, VU,DU by RF at 12/7/2022 0856    Acceptance, E, VU,DU by RF at 12/6/2022 0856    Acceptance, E, VU,DU by RF at 12/5/2022 0909    Acceptance, E,TB, VU,NR by EV at 12/4/2022 0018    Acceptance, E, VU,DU by RF at 12/2/2022 0838    Acceptance, E,TB, VU by LF at 11/29/2022 1123                   Point: Body mechanics (Done)     Description:   Instruct learner(s) on proper positioning and spine alignment during self-care, functional mobility activities and/or  exercises.              Learning Progress Summary           Patient Acceptance, E, VU,DU by RF at 12/7/2022 0856    Acceptance, E, VU,DU by RF at 12/6/2022 0856    Acceptance, E, VU,DU by RF at 12/5/2022 0909    Acceptance, E,TB, VU,NR by EV at 12/4/2022 0018    Acceptance, E, VU,DU by RF at 12/2/2022 0838    Acceptance, E,TB, VU by  at 11/29/2022 1123                               User Key     Initials Effective Dates Name Provider Type Discipline    EV 06/16/21 -  Shell Landon, RN Registered Nurse Nurse     06/16/21 -  Altagracia Mcneil OT Occupational Therapist OT     01/19/22 -  Kalyan Smalls, RN Registered Nurse Nurse              OT Recommendation and Plan  Planned Therapy Interventions (OT): activity tolerance training, functional balance retraining, occupation/activity based interventions, strengthening exercise, transfer/mobility retraining, patient/caregiver education/training, adaptive equipment training, BADL retraining, neuromuscular control/coordination retraining  Therapy Frequency (OT): 5 times/wk  Plan of Care Review  Plan of Care Reviewed With: patient  Progress: improving  Outcome Evaluation: A&Ox4; cooperative and participatory; Patient is highly limited due to deconditioning inconsistently needing 4L O2 still for activity participation; Patient displays limited task tolerance, balance, strength and safety requiring need for contiued skilled OT services to facilitate return to PLOF.     Time Calculation:    Time Calculation- OT     Row Name 12/09/22 0943             Time Calculation- OT    OT Received On 12/09/22  -EG      OT Goal Re-Cert Due Date 12/18/22  -EG         Untimed Charges    OT Eval/Re-eval Minutes 31  -EG         Total Minutes    Untimed Charges Total Minutes 31  -EG       Total Minutes 31  -EG            User Key  (r) = Recorded By, (t) = Taken By, (c) = Cosigned By    Initials Name Provider Type    Tamra Godinez OT Occupational Therapist              Therapy  Charges for Today     Code Description Service Date Service Provider Modifiers Qty    06217094096 HC OT RE-EVAL 2 12/9/2022 Tamra Goodwin, SHIRA GO 1               Tamra Goodwin OT  12/9/2022

## 2022-12-10 LAB
GLUCOSE BLDC GLUCOMTR-MCNC: 111 MG/DL (ref 70–99)
GLUCOSE BLDC GLUCOMTR-MCNC: 113 MG/DL (ref 70–99)
GLUCOSE BLDC GLUCOMTR-MCNC: 118 MG/DL (ref 70–99)

## 2022-12-10 PROCEDURE — 25010000002 ENOXAPARIN PER 10 MG: Performed by: FAMILY MEDICINE

## 2022-12-10 PROCEDURE — 94799 UNLISTED PULMONARY SVC/PX: CPT

## 2022-12-10 PROCEDURE — 82962 GLUCOSE BLOOD TEST: CPT

## 2022-12-10 PROCEDURE — 99232 SBSQ HOSP IP/OBS MODERATE 35: CPT | Performed by: INTERNAL MEDICINE

## 2022-12-10 PROCEDURE — 25010000002 ONDANSETRON PER 1 MG: Performed by: INTERNAL MEDICINE

## 2022-12-10 PROCEDURE — 94761 N-INVAS EAR/PLS OXIMETRY MLT: CPT

## 2022-12-10 RX ADMIN — ONDANSETRON 4 MG: 2 INJECTION INTRAMUSCULAR; INTRAVENOUS at 12:43

## 2022-12-10 RX ADMIN — IPRATROPIUM BROMIDE AND ALBUTEROL SULFATE 3 ML: 2.5; .5 SOLUTION RESPIRATORY (INHALATION) at 07:53

## 2022-12-10 RX ADMIN — Medication 10 ML: at 08:56

## 2022-12-10 RX ADMIN — MONTELUKAST 10 MG: 10 TABLET, FILM COATED ORAL at 08:56

## 2022-12-10 RX ADMIN — DIVALPROEX SODIUM 125 MG: 125 CAPSULE, COATED PELLETS ORAL at 20:00

## 2022-12-10 RX ADMIN — DICLOFENAC SODIUM 2 G: 10 GEL TOPICAL at 17:18

## 2022-12-10 RX ADMIN — SERTRALINE HYDROCHLORIDE 200 MG: 100 TABLET ORAL at 08:56

## 2022-12-10 RX ADMIN — SPIRONOLACTONE 25 MG: 25 TABLET ORAL at 08:56

## 2022-12-10 RX ADMIN — ALPRAZOLAM 0.5 MG: 0.25 TABLET ORAL at 06:21

## 2022-12-10 RX ADMIN — BUDESONIDE 0.25 MG: 0.25 INHALANT ORAL at 19:46

## 2022-12-10 RX ADMIN — ASPIRIN 81 MG CHEWABLE TABLET 81 MG: 81 TABLET CHEWABLE at 08:56

## 2022-12-10 RX ADMIN — IPRATROPIUM BROMIDE AND ALBUTEROL SULFATE 3 ML: 2.5; .5 SOLUTION RESPIRATORY (INHALATION) at 12:13

## 2022-12-10 RX ADMIN — DICLOFENAC SODIUM 2 G: 10 GEL TOPICAL at 20:00

## 2022-12-10 RX ADMIN — ARFORMOTEROL TARTRATE INHALATION 15 MCG: 15 SOLUTION RESPIRATORY (INHALATION) at 07:53

## 2022-12-10 RX ADMIN — ALPRAZOLAM 0.5 MG: 0.25 TABLET ORAL at 12:37

## 2022-12-10 RX ADMIN — ALPRAZOLAM 0.5 MG: 0.25 TABLET ORAL at 00:26

## 2022-12-10 RX ADMIN — ENOXAPARIN SODIUM 40 MG: 100 INJECTION SUBCUTANEOUS at 08:56

## 2022-12-10 RX ADMIN — ARFORMOTEROL TARTRATE INHALATION 15 MCG: 15 SOLUTION RESPIRATORY (INHALATION) at 19:45

## 2022-12-10 RX ADMIN — IPRATROPIUM BROMIDE AND ALBUTEROL SULFATE 3 ML: 2.5; .5 SOLUTION RESPIRATORY (INHALATION) at 00:09

## 2022-12-10 RX ADMIN — BUTALBITAL, ACETAMINOPHEN AND CAFFEINE 1 CAPSULE: 300; 40; 50 CAPSULE ORAL at 17:55

## 2022-12-10 RX ADMIN — IPRATROPIUM BROMIDE AND ALBUTEROL SULFATE 3 ML: 2.5; .5 SOLUTION RESPIRATORY (INHALATION) at 19:45

## 2022-12-10 RX ADMIN — DIVALPROEX SODIUM 125 MG: 125 CAPSULE, COATED PELLETS ORAL at 08:57

## 2022-12-10 RX ADMIN — ALPRAZOLAM 0.5 MG: 0.25 TABLET ORAL at 18:37

## 2022-12-10 RX ADMIN — ATENOLOL 50 MG: 50 TABLET ORAL at 08:57

## 2022-12-10 RX ADMIN — BUDESONIDE 0.25 MG: 0.25 INHALANT ORAL at 07:54

## 2022-12-10 RX ADMIN — LEVOTHYROXINE SODIUM 125 MCG: 125 TABLET ORAL at 05:19

## 2022-12-10 NOTE — PLAN OF CARE
Goal Outcome Evaluation: no change. VVS. Nos/s of distress noted. Continue plan of care.

## 2022-12-10 NOTE — PLAN OF CARE
Goal Outcome Evaluation:  Plan of Care Reviewed With: patient        Progress: no change  Outcome Evaluation: Patient is AOX4 and speaking with RN throughout shift and shows no signs or symptoms of distress at this time and none are reported to RN during shift. Patient has recieved PRN Xanax x1 And PRN Zofran x1 for nausea. Patient has call light within reach and is educated and aware of how to use at this time.

## 2022-12-10 NOTE — PROGRESS NOTES
[facility]   Hospitalist Progress Note  Date: 12/10/2022  Patient Name: Nova Manuel  : 1965  MRN: [id number]  Date of admission: 2022  Consultants:   -Pulmonology/Critical Care: Dr. Donte Mcqueen, Dr. Cheryle Silverio, Dr. Kamran Marquez, Dr. Levi Infante, Dr. Kamran Marquez    Subjective   Subjective     Chief Complaint: Shortness of breath    Summary:   Nova Manuel is a 57 y.o. female with severe COPD (FEV1: 13%), chronic hypoxemic and hypercapnic respiratory failure on 2 L O2 via nasal cannula and nocturnal NIPPV who presented to the ED with complaints of worsening shortness of breath.  Upon evaluation in ED patient was in respiratory distress and started on NIPPV with improvement in symptoms.  CXR did not demonstrate acute infiltrate or effusion.  CT chest negative for PE.  Hospitalist service contacted for further evaluation management.  Pulmonology consulted.  Systemic steroids, and inhaled bronchodilators and empiric antibiotics initiated.  Respiratory panel returned positive for human metapneumovirus.  Patient continued on BiPAP and respiratory function with some improvement.  Given patient's COPD severity palliative care was consulted and decision made to continue with aggressive care measures.  Patient unable to tolerate much p.o. intake some core track was placed and nutrition initiated.  Patient's respiratory status has been improving and patient able to be weaned from BiPAP to Airvo and subsequently to nasal cannula and home trilogy at night.  Rehab referrals made by social work, pre-CERT pending at [facility].  She was denied by insurance to go to [facility].  [facility] filing appeal.    Interval Followup:   No acute events overnight.  Complaining of pain in right knee.  Has DJD and at times it hurts bad with cold rainy days.  Remains on 4 L with good oxygen saturation   patient states that her breathing has been stable.  She continues to use incentive spirometer throughout the day.   She is very pleased with her progress.  She denies any chest pain, abdominal pain, nausea or vomiting.        Review of Systems   All systems reviewed and negative unless stated otherwise under subjective.    Objective   Objective     Vitals:   Temp:  [97.9 °F (36.6 °C)-98.6 °F (37 °C)] 97.9 °F (36.6 °C)  Heart Rate:  [57-75] 71  Resp:  [18] 18  BP: (100-139)/(42-53) 112/53  Flow (L/min):  [3] 3  Physical Exam   Gen: No acute distress, conversant, pleasant, sitting up in chair bedside  HEENT: MMM, Atraumatic  Neck: Supple, Trachea midline  Resp: CTAB, improved bilateral rhonchi, no increase in work of breathing, equal chest rise bilaterally, no conversational dyspnea noted  Card: RRR, No m/r/g  Abd: Soft, Nontender, Nondistended, + bowel sounds  Ext: No cyanosis, No clubbing.  Warm mildly tender right knee without redness.  Neuro: CN II-XII grossly intact, No focal deficits appreciated  Psych: AAO x 3, Normal mood, Normal affect    Result Review    Result Review:  I have personally reviewed the results as below and agree with these findings:  []  Laboratory:   CMP    CMP 12/1/22 12/2/22 12/3/22   Glucose 100 (A) 126 (A) 125 (A)   BUN 26 (A) 24 (A) 23 (A)   Creatinine 0.62 0.71 0.72   Sodium 135 (A) 138 137   Potassium 4.6 4.5 4.4   Chloride 88 (A) 91 (A) 92 (A)   Calcium 10.2 11.0 (A) 10.2   (A) Abnormal value       Comments are available for some flowsheets but are not being displayed.           CBC    CBC 12/1/22 12/2/22 12/3/22   WBC 9.60 8.36 7.63   RBC 3.92 4.04 4.02   Hemoglobin 12.4 12.7 12.6   Hematocrit 38.8 38.9 38.3   MCV 99.0 (A) 96.3 95.3   MCH 31.6 31.4 31.3   MCHC 32.0 32.6 32.9   RDW 12.3 12.3 12.0 (A)   Platelets 179 167 188   (A) Abnormal value            []  Microbiology:   []  Radiology:   []  EKG/Telemetry:   []  Cardiology/Vascular:    []  Pathology:  []  Old records:  []  Other:    Assessment & Plan   Assessment / Plan     Assessment:  Acute on chronic hypercarbic and hypoxemic respiratory  failure requiring NIPPV.  Patient has home oxygen  Acute COPD exacerbation  Human metapneumovirus pneumonia  Medical noncompliance  Obesity with BMI: 37.42  Type 2 diabetes mellitus with hyperglycemia  Anxiety  Contraction alkalosis  Hypertension  Pain right knee due to DJD .    Plan:  -Pulmonology/Critical Care consulted and following, appreciate assistance and recommendations in the care of this patient.  -Continue supplemental O2 to maintain sats greater than 90%, wean as tolerated  -Continue home trilogy at night and with naps and as needed throughout the day  -Continue Brovana, Pulmicort and duo nebs  -Continue Singulair  -Encouraged patient to continue using incentive spirometer  -No change insulin regimen.  Continue to monitor blood glucose level and SSI requirement and titrate accordingly.  -PT/OT consulted.  Patient encouraged to increase activity.  Out of bed to chair as tolerated.  I believe that with continued work with skilled therapy services patient's condition will improve and eventually patient would be able to return home.   -Continue appropriate home medications  -Voltaren gel for right knee  DVT Prophylaxis: Lovenox  Diet: Regular  Dispo: Social work making referrals  Code Status: Full code     Personally reviewed patients labs and imaging, discussed with patient and nurse at bedside.  Discussed with .  Await decision on appeal by [employer]     Part of this note may be an electronic transcription/translation of spoken language to printed text using the Dragon dictation system.    DVT prophylaxis:  Medical DVT prophylaxis orders are present.    CODE STATUS:   Code Status (Patient has no pulse and is not breathing): CPR (Attempt to Resuscitate)  Medical Interventions (Patient has pulse or is breathing): Full Support  Release to patient: Routine Release          Electronically signed by Rodrigo Espinosa MD, 12/10/22, 6:00 PM EST.  .  .

## 2022-12-11 LAB
GLUCOSE BLDC GLUCOMTR-MCNC: 100 MG/DL (ref 70–99)
GLUCOSE BLDC GLUCOMTR-MCNC: 118 MG/DL (ref 70–99)
GLUCOSE BLDC GLUCOMTR-MCNC: 129 MG/DL (ref 70–99)

## 2022-12-11 PROCEDURE — 25010000002 ENOXAPARIN PER 10 MG: Performed by: FAMILY MEDICINE

## 2022-12-11 PROCEDURE — 94664 DEMO&/EVAL PT USE INHALER: CPT

## 2022-12-11 PROCEDURE — 25010000002 ONDANSETRON PER 1 MG: Performed by: INTERNAL MEDICINE

## 2022-12-11 PROCEDURE — 94799 UNLISTED PULMONARY SVC/PX: CPT

## 2022-12-11 PROCEDURE — 99232 SBSQ HOSP IP/OBS MODERATE 35: CPT | Performed by: INTERNAL MEDICINE

## 2022-12-11 PROCEDURE — 94761 N-INVAS EAR/PLS OXIMETRY MLT: CPT

## 2022-12-11 PROCEDURE — 82962 GLUCOSE BLOOD TEST: CPT

## 2022-12-11 RX ADMIN — ARFORMOTEROL TARTRATE INHALATION 15 MCG: 15 SOLUTION RESPIRATORY (INHALATION) at 18:25

## 2022-12-11 RX ADMIN — IPRATROPIUM BROMIDE AND ALBUTEROL SULFATE 3 ML: 2.5; .5 SOLUTION RESPIRATORY (INHALATION) at 00:29

## 2022-12-11 RX ADMIN — SPIRONOLACTONE 25 MG: 25 TABLET ORAL at 08:43

## 2022-12-11 RX ADMIN — ALPRAZOLAM 0.5 MG: 0.25 TABLET ORAL at 00:14

## 2022-12-11 RX ADMIN — BUDESONIDE 0.25 MG: 0.25 INHALANT ORAL at 18:25

## 2022-12-11 RX ADMIN — ALPRAZOLAM 0.5 MG: 0.25 TABLET ORAL at 05:42

## 2022-12-11 RX ADMIN — BUTALBITAL, ACETAMINOPHEN AND CAFFEINE 1 CAPSULE: 300; 40; 50 CAPSULE ORAL at 20:47

## 2022-12-11 RX ADMIN — MONTELUKAST 10 MG: 10 TABLET, FILM COATED ORAL at 08:43

## 2022-12-11 RX ADMIN — ASPIRIN 81 MG CHEWABLE TABLET 81 MG: 81 TABLET CHEWABLE at 08:43

## 2022-12-11 RX ADMIN — BUDESONIDE 0.25 MG: 0.25 INHALANT ORAL at 06:19

## 2022-12-11 RX ADMIN — ALPRAZOLAM 0.5 MG: 0.25 TABLET ORAL at 18:17

## 2022-12-11 RX ADMIN — ATENOLOL 50 MG: 50 TABLET ORAL at 08:43

## 2022-12-11 RX ADMIN — IPRATROPIUM BROMIDE AND ALBUTEROL SULFATE 3 ML: 2.5; .5 SOLUTION RESPIRATORY (INHALATION) at 18:25

## 2022-12-11 RX ADMIN — ONDANSETRON 4 MG: 2 INJECTION INTRAMUSCULAR; INTRAVENOUS at 20:54

## 2022-12-11 RX ADMIN — DIVALPROEX SODIUM 125 MG: 125 CAPSULE, COATED PELLETS ORAL at 20:47

## 2022-12-11 RX ADMIN — ARFORMOTEROL TARTRATE INHALATION 15 MCG: 15 SOLUTION RESPIRATORY (INHALATION) at 06:19

## 2022-12-11 RX ADMIN — ALPRAZOLAM 0.5 MG: 0.25 TABLET ORAL at 11:45

## 2022-12-11 RX ADMIN — DIVALPROEX SODIUM 125 MG: 125 CAPSULE, COATED PELLETS ORAL at 08:43

## 2022-12-11 RX ADMIN — IPRATROPIUM BROMIDE AND ALBUTEROL SULFATE 3 ML: 2.5; .5 SOLUTION RESPIRATORY (INHALATION) at 12:54

## 2022-12-11 RX ADMIN — IPRATROPIUM BROMIDE AND ALBUTEROL SULFATE 3 ML: 2.5; .5 SOLUTION RESPIRATORY (INHALATION) at 06:19

## 2022-12-11 RX ADMIN — LEVOTHYROXINE SODIUM 125 MCG: 125 TABLET ORAL at 05:42

## 2022-12-11 RX ADMIN — ENOXAPARIN SODIUM 40 MG: 100 INJECTION SUBCUTANEOUS at 08:46

## 2022-12-11 RX ADMIN — SERTRALINE HYDROCHLORIDE 200 MG: 100 TABLET ORAL at 08:43

## 2022-12-11 RX ADMIN — BUTALBITAL, ACETAMINOPHEN AND CAFFEINE 1 CAPSULE: 300; 40; 50 CAPSULE ORAL at 05:42

## 2022-12-11 RX ADMIN — DICLOFENAC SODIUM 2 G: 10 GEL TOPICAL at 17:12

## 2022-12-11 NOTE — PLAN OF CARE
Goal Outcome Evaluation:              Outcome Evaluation: pt up to chair during shift. medicated with xanax per prn orders. remains on 02. will continue to monitor.

## 2022-12-11 NOTE — PROGRESS NOTES
[facility]   Hospitalist Progress Note  Date: 2022  Patient Name: Nova Manuel  : 1965  MRN: [id number]  Date of admission: 2022  Consultants:   -Pulmonology/Critical Care: Dr. Donte Mcqueen, Dr. Cheryle Silverio, Dr. Kamran Marquez, Dr. Levi Infante, Dr. Kamran Marquez    Subjective   Subjective     Chief Complaint: Shortness of breath    Summary:   Nova Manuel is a 57 y.o. female with severe COPD (FEV1: 13%), chronic hypoxemic and hypercapnic respiratory failure on 2 L O2 via nasal cannula and nocturnal NIPPV who presented to the ED with complaints of worsening shortness of breath.  Upon evaluation in ED patient was in respiratory distress and started on NIPPV with improvement in symptoms.  CXR did not demonstrate acute infiltrate or effusion.  CT chest negative for PE.  Hospitalist service contacted for further evaluation management.  Pulmonology consulted.  Systemic steroids, and inhaled bronchodilators and empiric antibiotics initiated.  Respiratory panel returned positive for human metapneumovirus.  Patient continued on BiPAP and respiratory function with some improvement.  Given patient's COPD severity palliative care was consulted and decision made to continue with aggressive care measures.  Patient unable to tolerate much p.o. intake some core track was placed and nutrition initiated.  Patient's respiratory status has been improving and patient able to be weaned from BiPAP to Airvo and subsequently to nasal cannula and home trilogy at night.  Rehab referrals made by social work, pre-CERT pending at [facility].  She was denied by insurance to go to [facility].  [facility] filing appeal.    Interval Followup:   No acute events overnight.    Right knee hurts.  Voltaren gel helps her symptoms   remains on 4 L with good oxygen saturation   patient states that her breathing has been stable.  She continues to use incentive spirometer throughout the day.  She is very pleased with her progress.   She denies any chest pain, abdominal pain, nausea or vomiting.        Review of Systems   All systems reviewed and negative unless stated otherwise under subjective.    Objective   Objective     Vitals:   Temp:  [97.9 °F (36.6 °C)-98.6 °F (37 °C)] 98.2 °F (36.8 °C)  Heart Rate:  [60-82] 70  Resp:  [16-20] 16  BP: (105-126)/(40-56) 126/48  Flow (L/min):  [3] 3  Physical Exam   Gen: No acute distress, conversant, pleasant, sitting up in chair bedside  HEENT: MMM, Atraumatic  Neck: Supple, Trachea midline  Resp: CTAB, improved bilateral rhonchi, no increase in work of breathing, equal chest rise bilaterally, no conversational dyspnea noted  Card: RRR, No m/r/g  Abd: Soft, Nontender, Nondistended, + bowel sounds  Ext: No cyanosis, No clubbing.  Warm mildly tender right knee without redness.  Neuro: CN II-XII grossly intact, No focal deficits appreciated  Psych: AAO x 3, Normal mood, Normal affect    Result Review    Result Review:  I have personally reviewed the results as below and agree with these findings:  []  Laboratory:   CMP    CMP 12/1/22 12/2/22 12/3/22   Glucose 100 (A) 126 (A) 125 (A)   BUN 26 (A) 24 (A) 23 (A)   Creatinine 0.62 0.71 0.72   Sodium 135 (A) 138 137   Potassium 4.6 4.5 4.4   Chloride 88 (A) 91 (A) 92 (A)   Calcium 10.2 11.0 (A) 10.2   (A) Abnormal value       Comments are available for some flowsheets but are not being displayed.           CBC    CBC 12/1/22 12/2/22 12/3/22   WBC 9.60 8.36 7.63   RBC 3.92 4.04 4.02   Hemoglobin 12.4 12.7 12.6   Hematocrit 38.8 38.9 38.3   MCV 99.0 (A) 96.3 95.3   MCH 31.6 31.4 31.3   MCHC 32.0 32.6 32.9   RDW 12.3 12.3 12.0 (A)   Platelets 179 167 188   (A) Abnormal value            []  Microbiology:   []  Radiology:   []  EKG/Telemetry:   []  Cardiology/Vascular:    []  Pathology:  []  Old records:  []  Other:    Assessment & Plan   Assessment / Plan     Assessment:  Acute on chronic hypercarbic and hypoxemic respiratory failure requiring NIPPV.  Patient has  home oxygen  Acute COPD exacerbation  Human metapneumovirus pneumonia  Medical noncompliance  Obesity with BMI: 37.42  Type 2 diabetes mellitus with hyperglycemia  Anxiety  Contraction alkalosis  Hypertension  Pain right knee due to DJD .    Plan:  -Pulmonology/Critical Care consulted and following, appreciate assistance and recommendations in the care of this patient.  -Continue supplemental O2 to maintain sats greater than 90%, wean as tolerated  -Continue home trilogy at night and with naps and as needed throughout the day  -Continue Brovana, Pulmicort and duo nebs  -Continue Singulair  -Encouraged patient to continue using incentive spirometer  -No change insulin regimen.  Continue to monitor blood glucose level and SSI requirement and titrate accordingly.  -PT/OT consulted.  Patient encouraged to increase activity.  Out of bed to chair as tolerated.  I believe that with continued work with skilled therapy services patient's condition will improve and eventually patient would be able to return home.   -Continue appropriate home medications  -Voltaren gel for right knee  DVT Prophylaxis: Lovenox  Diet: Regular  Dispo: Social work making referrals  Code Status: Full code     Personally reviewed patients labs and imaging, discussed with patient and nurse at bedside.  Discussed with .  Await decision on appeal by [employer]     Part of this note may be an electronic transcription/translation of spoken language to printed text using the Dragon dictation system.    DVT prophylaxis:  Medical DVT prophylaxis orders are present.    CODE STATUS:   Code Status (Patient has no pulse and is not breathing): CPR (Attempt to Resuscitate)  Medical Interventions (Patient has pulse or is breathing): Full Support  Release to patient: Routine Release        Electronically signed by Rodirgo Espinosa MD, 12/11/22, 6:32 PM EST.    .

## 2022-12-12 LAB
GLUCOSE BLDC GLUCOMTR-MCNC: 112 MG/DL (ref 70–99)
GLUCOSE BLDC GLUCOMTR-MCNC: 121 MG/DL (ref 70–99)
GLUCOSE BLDC GLUCOMTR-MCNC: 128 MG/DL (ref 70–99)

## 2022-12-12 PROCEDURE — 99232 SBSQ HOSP IP/OBS MODERATE 35: CPT | Performed by: INTERNAL MEDICINE

## 2022-12-12 PROCEDURE — 97164 PT RE-EVAL EST PLAN CARE: CPT

## 2022-12-12 PROCEDURE — 82962 GLUCOSE BLOOD TEST: CPT

## 2022-12-12 PROCEDURE — 97530 THERAPEUTIC ACTIVITIES: CPT

## 2022-12-12 PROCEDURE — 25010000002 ONDANSETRON PER 1 MG: Performed by: INTERNAL MEDICINE

## 2022-12-12 PROCEDURE — 94799 UNLISTED PULMONARY SVC/PX: CPT

## 2022-12-12 PROCEDURE — 25010000002 ENOXAPARIN PER 10 MG: Performed by: FAMILY MEDICINE

## 2022-12-12 PROCEDURE — 94761 N-INVAS EAR/PLS OXIMETRY MLT: CPT

## 2022-12-12 RX ADMIN — LEVOTHYROXINE SODIUM 125 MCG: 125 TABLET ORAL at 05:18

## 2022-12-12 RX ADMIN — ASPIRIN 81 MG CHEWABLE TABLET 81 MG: 81 TABLET CHEWABLE at 08:08

## 2022-12-12 RX ADMIN — ALPRAZOLAM 0.5 MG: 0.25 TABLET ORAL at 13:31

## 2022-12-12 RX ADMIN — ARFORMOTEROL TARTRATE INHALATION 15 MCG: 15 SOLUTION RESPIRATORY (INHALATION) at 07:58

## 2022-12-12 RX ADMIN — ATENOLOL 50 MG: 50 TABLET ORAL at 08:08

## 2022-12-12 RX ADMIN — Medication 10 ML: at 20:10

## 2022-12-12 RX ADMIN — Medication 10 ML: at 08:07

## 2022-12-12 RX ADMIN — BUDESONIDE 0.25 MG: 0.25 INHALANT ORAL at 19:10

## 2022-12-12 RX ADMIN — ARFORMOTEROL TARTRATE INHALATION 15 MCG: 15 SOLUTION RESPIRATORY (INHALATION) at 19:10

## 2022-12-12 RX ADMIN — IPRATROPIUM BROMIDE AND ALBUTEROL SULFATE 3 ML: 2.5; .5 SOLUTION RESPIRATORY (INHALATION) at 01:13

## 2022-12-12 RX ADMIN — ENOXAPARIN SODIUM 40 MG: 100 INJECTION SUBCUTANEOUS at 08:39

## 2022-12-12 RX ADMIN — IPRATROPIUM BROMIDE AND ALBUTEROL SULFATE 3 ML: 2.5; .5 SOLUTION RESPIRATORY (INHALATION) at 07:58

## 2022-12-12 RX ADMIN — ALPRAZOLAM 0.5 MG: 0.25 TABLET ORAL at 20:09

## 2022-12-12 RX ADMIN — SPIRONOLACTONE 25 MG: 25 TABLET ORAL at 08:08

## 2022-12-12 RX ADMIN — ONDANSETRON 4 MG: 2 INJECTION INTRAMUSCULAR; INTRAVENOUS at 08:07

## 2022-12-12 RX ADMIN — Medication 2 G: at 20:15

## 2022-12-12 RX ADMIN — ALPRAZOLAM 0.5 MG: 0.25 TABLET ORAL at 01:16

## 2022-12-12 RX ADMIN — DIVALPROEX SODIUM 125 MG: 125 CAPSULE, COATED PELLETS ORAL at 08:08

## 2022-12-12 RX ADMIN — Medication 2 G: at 17:17

## 2022-12-12 RX ADMIN — IPRATROPIUM BROMIDE AND ALBUTEROL SULFATE 3 ML: 2.5; .5 SOLUTION RESPIRATORY (INHALATION) at 19:10

## 2022-12-12 RX ADMIN — IPRATROPIUM BROMIDE AND ALBUTEROL SULFATE 3 ML: 2.5; .5 SOLUTION RESPIRATORY (INHALATION) at 11:48

## 2022-12-12 RX ADMIN — BUDESONIDE 0.25 MG: 0.25 INHALANT ORAL at 07:58

## 2022-12-12 RX ADMIN — DIVALPROEX SODIUM 125 MG: 125 CAPSULE, COATED PELLETS ORAL at 20:09

## 2022-12-12 RX ADMIN — ALPRAZOLAM 0.5 MG: 0.25 TABLET ORAL at 07:26

## 2022-12-12 RX ADMIN — BUTALBITAL, ACETAMINOPHEN AND CAFFEINE 1 CAPSULE: 300; 40; 50 CAPSULE ORAL at 16:53

## 2022-12-12 RX ADMIN — MONTELUKAST 10 MG: 10 TABLET, FILM COATED ORAL at 08:08

## 2022-12-12 RX ADMIN — SERTRALINE HYDROCHLORIDE 200 MG: 100 TABLET ORAL at 08:08

## 2022-12-12 NOTE — CONSULTS
"Nutrition Services    Patient Name: Nova Manuel  YOB: 1965  MRN: [id number]  Admission date: 11/18/2022      CLINICAL NUTRITION ASSESSMENT      Reason for Assessment  Follow-up protocol   H&P:    Past Medical History:   Diagnosis Date   • Anxiety    • Asthma    • CHF (congestive heart failure) (HCC)    • COPD (chronic obstructive pulmonary disease) (HCC)    • Diabetes mellitus (HCC)    • Disease of thyroid gland    • Hyperlipidemia    • Hypertension         Current Problems:   Active Hospital Problems    Diagnosis    • **Acute on chronic respiratory failure with hypoxia and hypercapnia (HCC)    • COPD with acute exacerbation (HCC)    • Restrictive lung disease         Nutrition/Diet History         Narrative     Pt continues to eat well 100% all meals.  Labs reviewed. BM today.       Anthropometrics        Current Height, Weight Height: 157.5 cm (62\")  Weight: 90.4 kg (199 lb 4.7 oz)   Current BMI Body mass index is 36.45 kg/m².  Class III Obesity       Weight Hx  Wt Readings from Last 30 Encounters:   12/11/22 0712 90.4 kg (199 lb 4.7 oz)   12/10/22 0435 86.2 kg (190 lb 0.6 oz)   12/08/22 0347 85.5 kg (188 lb 7.9 oz)   12/07/22 0904 88.2 kg (194 lb 7.1 oz)   12/06/22 0432 89.5 kg (197 lb 5 oz)   12/05/22 0625 90.3 kg (199 lb 1.2 oz)   12/05/22 0310 90.3 kg (199 lb 1.2 oz)   12/04/22 1540 87.9 kg (193 lb 12.6 oz)   12/04/22 1409 86.2 kg (190 lb 0.6 oz)   12/03/22 0515 98.1 kg (216 lb 4.3 oz)   12/02/22 0548 98.4 kg (216 lb 14.9 oz)   12/01/22 0600 98.8 kg (217 lb 13 oz)   11/30/22 0600 92.8 kg (204 lb 9.4 oz)   11/29/22 0505 90.9 kg (200 lb 6.4 oz)   11/28/22 0642 92.4 kg (203 lb 11.3 oz)   11/27/22 0700 91.5 kg (201 lb 11.5 oz)   11/25/22 0609 93.6 kg (206 lb 5.6 oz)   11/19/22 0105 101 kg (223 lb 8.7 oz)   11/18/22 2151 95.3 kg (210 lb 1.6 oz)   11/04/22 1416 96.8 kg (213 lb 6.4 oz)   11/02/21 1142 95.3 kg (210 lb)   10/03/18 1343 111 kg (245 lb)   02/19/18 0000 111 kg (245 lb)            Wt " Change Observation Loss 6.5% x 30 days, severe     Estimated/Assessed Needs       Energy Requirements 30-35 kcal/kg Adj BW   EST Needs (kcal/day) 6955-6194       Protein Requirements 1.2-1.5 g/kg Adj BW   EST Daily Needs (g/day) 72-90       Fluid Requirements 25 mL/kg    Estimated Needs (mL/day) 2250     Labs/Medications         Pertinent Labs Reviewed.         Invalid input(s): LABALBU, PROT      Lab Results   Component Value Date    HGBA1C 5.90 (H) 11/20/2022         Pertinent Medications Reviewed.     Current Nutrition Orders & Evaluation of Intake       Oral Nutrition     Current PO Diet Diet: Regular/House Diet, Diabetic Diets; Consistent Carbohydrate; Texture: Soft to Chew (NDD 3); Soft to Chew: Whole Meat; Fluid Consistency: Thin (IDDSI 0)   Supplement Orders Placed This Encounter      DIET MESSAGE Patient requests sausage, eggs, and whole milk on tray      DIET MESSAGE No beef no tea       Nutrition Diagnosis         Nutrition Dx Problem 1 No nutrition diagnosis at this time      Nutrition Intervention         Continue current diet order     Medical Nutrition Therapy/Nutrition Education          Learner     Readiness Patient  Education not indicated at this time     Method     Response N/A  N/A     Monitor/Evaluation        Monitor PO intake, Pertinent labs, Weight     Nutrition Discharge Plan         No nutrition discharge needs identified at this time     Electronically signed by:  Jacquelyn Salgado RD  12/12/22 13:23 EST

## 2022-12-12 NOTE — PROGRESS NOTES
[facility]   Hospitalist Progress Note  Date: 2022  Patient Name: Nova Manuel  : 1965  MRN: [id number]  Date of admission: 2022  Consultants:   -Pulmonology/Critical Care: Dr. Donte Mcqueen, Dr. Cheryle Silverio, Dr. Kamran Marquez, Dr. Levi Infante, Dr. Kamran Marquez    Subjective   Subjective     Chief Complaint: Shortness of breath    Summary:   Nova Manuel is a 57 y.o. female with severe COPD (FEV1: 13%), chronic hypoxemic and hypercapnic respiratory failure on 2 L O2 via nasal cannula and nocturnal NIPPV who presented to the ED with complaints of worsening shortness of breath.  Upon evaluation in ED patient was in respiratory distress and started on NIPPV with improvement in symptoms.  CXR did not demonstrate acute infiltrate or effusion.  CT chest negative for PE.  Hospitalist service contacted for further evaluation management.  Pulmonology consulted.  Systemic steroids, and inhaled bronchodilators and empiric antibiotics initiated.  Respiratory panel returned positive for human metapneumovirus.  Patient continued on BiPAP and respiratory function with some improvement.  Given patient's COPD severity palliative care was consulted and decision made to continue with aggressive care measures.  Patient unable to tolerate much p.o. intake some core track was placed and nutrition initiated.  Patient's respiratory status has been improving and patient able to be weaned from BiPAP to Airvo and subsequently to nasal cannula and home trilogy at night.  Rehab referrals made by social work, pre-CERT pending at [facility].  She was denied by insurance to go to [facility].  [facility] filing appeal.    Interval Followup:   No acute events overnight.    Right knee hurts.  Voltaren gel helping her symptoms   remains on 4 L with good oxygen saturation   patient states that her breathing has been stable.  She continues to use incentive spirometer throughout the day.  She is very pleased with her  progress.  She denies any chest pain, abdominal pain, nausea or vomiting.        Review of Systems   All systems reviewed and negative unless stated otherwise under subjective.    Objective   Objective     Vitals:   Temp:  [97.2 °F (36.2 °C)-98.7 °F (37.1 °C)] 97.2 °F (36.2 °C)  Heart Rate:  [65-78] 68  Resp:  [16-22] 20  BP: (100-130)/(48-70) 100/60  Flow (L/min):  [3] 3  Physical Exam   Gen: No acute distress, conversant, pleasant, sitting up in chair bedside  HEENT: MMM, Atraumatic  Neck: Supple, Trachea midline  Resp: CTAB, improved bilateral rhonchi, no increase in work of breathing, equal chest rise bilaterally, no conversational dyspnea noted  Card: RRR, No m/r/g  Abd: Soft, Nontender, Nondistended, + bowel sounds  Ext: No cyanosis, No clubbing.  Warm mildly tender right knee without redness.  Neuro: CN II-XII grossly intact, No focal deficits appreciated  Psych: AAO x 3, Normal mood, Normal affect    Result Review    Result Review:  I have personally reviewed the results as below and agree with these findings:  []  Laboratory:   CMP    CMP 12/1/22 12/2/22 12/3/22   Glucose 100 (A) 126 (A) 125 (A)   BUN 26 (A) 24 (A) 23 (A)   Creatinine 0.62 0.71 0.72   Sodium 135 (A) 138 137   Potassium 4.6 4.5 4.4   Chloride 88 (A) 91 (A) 92 (A)   Calcium 10.2 11.0 (A) 10.2   (A) Abnormal value       Comments are available for some flowsheets but are not being displayed.           CBC    CBC 12/1/22 12/2/22 12/3/22   WBC 9.60 8.36 7.63   RBC 3.92 4.04 4.02   Hemoglobin 12.4 12.7 12.6   Hematocrit 38.8 38.9 38.3   MCV 99.0 (A) 96.3 95.3   MCH 31.6 31.4 31.3   MCHC 32.0 32.6 32.9   RDW 12.3 12.3 12.0 (A)   Platelets 179 167 188   (A) Abnormal value            []  Microbiology:   []  Radiology:   []  EKG/Telemetry:   []  Cardiology/Vascular:    []  Pathology:  []  Old records:  []  Other:    Assessment & Plan   Assessment / Plan     Assessment:  Acute on chronic hypercarbic and hypoxemic respiratory failure requiring NIPPV.   Patient has home oxygen  Acute COPD exacerbation  Human metapneumovirus pneumonia  Medical noncompliance  Obesity with BMI: 37.42  Type 2 diabetes mellitus with hyperglycemia  Anxiety  Contraction alkalosis  Hypertension  Pain right knee due to DJD .    Plan:  -Pulmonology/Critical Care consulted and following, appreciate assistance and recommendations in the care of this patient.  -Continue supplemental O2 to maintain sats greater than 90%, wean as tolerated  -Continue home trilogy at night and with naps and as needed throughout the day  -Continue Brovana, Pulmicort and duo nebs  -Continue Singulair  -Encouraged patient to continue using incentive spirometer  -No change insulin regimen.  Continue to monitor blood glucose level and SSI requirement and titrate accordingly.  -PT/OT consulted.  Patient encouraged to increase activity.  Out of bed to chair as tolerated.  I believe that with continued work with skilled therapy services patient's condition will improve and eventually patient would be able to return home.   -Continue appropriate home medications  -Voltaren gel for right knee  DVT Prophylaxis: Lovenox  Diet: Regular  Dispo: Social work making referrals  Code Status: Full code     Personally reviewed patients labs and imaging, discussed with patient and nurse at bedside.  Discussed with .  Await decision on appeal by [employer].  It may take up to 30 days.  Meanwhile patient thinking of going home.  Patient is stable for discharge.     Part of this note may be an electronic transcription/translation of spoken language to printed text using the Dragon dictation system.    DVT prophylaxis:  Medical DVT prophylaxis orders are present.    CODE STATUS:   Code Status (Patient has no pulse and is not breathing): CPR (Attempt to Resuscitate)  Medical Interventions (Patient has pulse or is breathing): Full Support  Release to patient: Routine Release          Electronically signed by Rodrigo Espinosa MD,  12/12/22, 5:39 PM EST.  .    .

## 2022-12-12 NOTE — THERAPY RE-EVALUATION
Acute Care - Physical Therapy Re-Evaluation   [facility]     Patient Name: Nova Manuel  : 1965  MRN: [id number]  Today's Date: 2022      Visit Dx:     ICD-10-CM ICD-9-CM   1. Acute on chronic respiratory failure with hypoxia and hypercapnia (HCC)  J96.21 518.84    J96.22 786.09     799.02   2. COPD with acute exacerbation (HCC)  J44.1 491.21   3. Oropharyngeal dysphagia  R13.12 787.22   4. Decreased activities of daily living (ADL)  Z78.9 V49.89   5. Difficulty walking  R26.2 719.7     Patient Active Problem List   Diagnosis   • Centrilobular emphysema (HCC)   • Chronic respiratory failure with hypoxia and hypercapnia (HCC)   • Acute on chronic respiratory failure with hypoxia and hypercapnia (HCC)   • COPD with acute exacerbation (HCC)   • Restrictive lung disease   • Allergic rhinitis   • Anemia, pernicious   • Anxiety   • Asthma   • Diabetes (HCC)   • Migraine   • Mitral valve prolapse   • Morbid obesity with body mass index of 40.0-44.9 in adult (HCC)     Past Medical History:   Diagnosis Date   • Anxiety    • Asthma    • CHF (congestive heart failure) (HCC)    • COPD (chronic obstructive pulmonary disease) (HCC)    • Diabetes mellitus (HCC)    • Disease of thyroid gland    • Hyperlipidemia    • Hypertension      Past Surgical History:   Procedure Laterality Date   •  SECTION     • CHOLECYSTECTOMY     • HYSTERECTOMY       PT Assessment (last 12 hours)     PT Evaluation and Treatment     Row Name 22 1154          Physical Therapy Time and Intention    Subjective Information no complaints  -CS     Document Type re-evaluation  -CS     Mode of Treatment individual therapy;physical therapy  -CS     Patient Effort good  -CS     Symptoms Noted During/After Treatment shortness of breath  -CS     Row Name 22 1154          Bed Mobility    Bed Mobility bed mobility (all) activities  -CS     All Activities, [place] (Bed Mobility) supervision;standby assist;verbal cues  -CS     Bed  Mobility, Safety Issues decreased use of arms for pushing/pulling;decreased use of legs for bridging/pushing  -     Assistive Device (Bed Mobility) bed rails  -     Row Name 12/12/22 1154          Transfers    Transfers sit-stand transfer;stand-sit transfer;toilet transfer  -     Comment, (Transfers) Patient requiring increased time to complete all transfers and functional mobility due to increased shortness of breath with minimal exertion.  -     Row Name 12/12/22 1154          Sit-Stand Transfer    Sit-Stand [place] (Transfers) verbal cues;standby assist;contact guard  -     Assistive Device (Sit-Stand Transfers) walker, front-wheeled  -CS     Row Name 12/12/22 1154          Stand-Sit Transfer    Stand-Sit [place] (Transfers) verbal cues;standby assist;contact guard  -     Assistive Device (Stand-Sit Transfers) walker, front-wheeled  -     Row Name 12/12/22 1154          Toilet Transfer    Type (Toilet Transfer) stand pivot/stand step;sit-stand;stand-sit  -     [place] Level (Toilet Transfer) standby assist;contact guard;1 person assist  -     Assistive Device (Toilet Transfer) commode, bedside without drop arms  -     Row Name 12/12/22 1154          Gait/Stairs (Locomotion)    Gait/Stairs Locomotion gait/ambulation assistive device  -     [place] Level (Gait) contact guard  -     Assistive Device (Gait) walker, front-wheeled  -     Distance in Feet (Gait) 5  -CS     Deviations/Abnormal Patterns (Gait) lilly decreased;gait speed decreased;stride length decreased  -CS     Bilateral Gait Deviations forward flexed posture  -     Row Name 12/12/22 1154          Safety Issues, Functional Mobility    Safety Issues Affecting Function (Mobility) insight into deficits/self-awareness;safety precaution awareness  -     Impairments Affecting Function (Mobility) balance;endurance/activity tolerance;shortness of breath  -     Row Name 12/12/22 1151          Plan of Care  Review    Plan of Care Reviewed With patient  -CS     Progress improving  -CS     Outcome Evaluation To optimize functional mobility and independence, skilled PT services needed to address mobility needs, strength, endurance, and balance impairments.  Continue plan of care for another 10 days.  -     Row Name 12/12/22 1154          Progress Summary (PT)    Progress Toward Functional Goals (PT) progress toward functional goals is fair  -CS     Daily Progress Summary (PT) Increased shortness of breath with minimal exertion and increased time to complete functional mobility and tasks due to the above and oxygen saturation levels dropping. Patient will benefit from continued skilled PT services to address the following as well as improve endurance, strength, and safety with mobility.  -CS     Row Name 12/12/22 1154          Therapy Plan Review/Discharge Plan (PT)    Therapy Plan Review (PT) evaluation/treatment results reviewed;patient  -CS     Row Name 12/12/22 1154          Transfer Goal 1 (PT)    Activity/Assistive Device (Transfer Goal 1, PT) sit-to-stand/stand-to-sit;bed-to-chair/chair-to-bed;walker, rolling  -CS     [place] Level/Cues Needed (Transfer Goal 1, PT) modified independence  -CS     Time Frame (Transfer Goal 1, PT) 10 days;long term goal (LTG)  -CS     Progress/Outcome (Transfer Goal 1, PT) goal not met;goal ongoing;continuing progress toward goal;goal revised this date  -     Row Name 12/12/22 1154          Gait Training Goal 1 (PT)    Activity/Assistive Device (Gait Training Goal 1, PT) gait (walking locomotion);assistive device use;walker, rolling  -CS     [place] Level (Gait Training Goal 1, PT) modified independence  -CS     Distance (Gait Training Goal 1, PT) 100  -CS     Time Frame (Gait Training Goal 1, PT) 10 days;long term goal (LTG)  -CS     Progress/Outcome (Gait Training Goal 1, PT) goal not met;goal ongoing;continuing progress toward goal;goal revised this date  -            User Key  (r) = Recorded By, (t) = Taken By, (c) = Cosigned By    Initials Name Provider Type    Christine Mcknight PT Physical Therapist                Physical Therapy Education     Title: PT OT SLP Therapies (Done)     Topic: Physical Therapy (Done)     Point: Mobility training (Done)     Learning Progress Summary           Patient Acceptance, E, VU,DU by RF at 12/7/2022 0856    Acceptance, E, VU,DU by RF at 12/6/2022 0856    Acceptance, E, VU,DU by RF at 12/5/2022 0909    Acceptance, E,TB, VU,NR by EV at 12/4/2022 0018    Acceptance, E, VU,DU by RF at 12/2/2022 0838    Acceptance, E,TB, VU by AD at 11/29/2022 1311                   Point: Home exercise program (Done)     Learning Progress Summary           Patient Acceptance, E, VU,DU by RF at 12/7/2022 0856    Acceptance, E, VU,DU by RF at 12/6/2022 0856    Acceptance, E, VU,DU by RF at 12/5/2022 0909    Acceptance, E,TB, VU,NR by EV at 12/4/2022 0018    Acceptance, E, VU,DU by RF at 12/2/2022 0838    Acceptance, E,TB, VU by JR at 11/30/2022 1433                   Point: Body mechanics (Done)     Learning Progress Summary           Patient Acceptance, E, VU,DU by RF at 12/7/2022 0856    Acceptance, E, VU,DU by RF at 12/6/2022 0856    Acceptance, E, VU,DU by RF at 12/5/2022 0909    Acceptance, E,TB, VU,NR by EV at 12/4/2022 0018    Acceptance, E, VU,DU by RF at 12/2/2022 0838    Acceptance, E,TB, VU by AD at 11/29/2022 1311                   Point: Precautions (Done)     Learning Progress Summary           Patient Acceptance, E, VU,DU by RF at 12/7/2022 0856    Acceptance, E, VU,DU by RF at 12/6/2022 0856    Acceptance, E, VU,DU by RF at 12/5/2022 0909    Acceptance, E,TB, VU,NR by EV at 12/4/2022 0018    Acceptance, E, VU,DU by RF at 12/2/2022 0838    Acceptance, E,TB, VU by AD at 11/29/2022 1311                               User Key     Initials Effective Dates Name Provider Type Discipline    EV 06/16/21 -  Shell Landon, RN Registered Nurse Nurse    JR  03/14/22 -  Margarita Lebron, RN Registered Nurse Nurse    RF 01/19/22 -  Kalyan Smalls, RN Registered Nurse Nurse    AD 10/18/22 -  Jcarlos Min, [employer] Student PT Student PT              PT Recommendation and Plan  Anticipated Discharge Disposition (PT): inpatient rehabilitation facility, skilled nursing facility  Progress Summary (PT)  Progress Toward Functional Goals (PT): progress toward functional goals is fair  Daily Progress Summary (PT): Increased shortness of breath with minimal exertion and increased time to complete functional mobility and tasks due to the above and oxygen saturation levels dropping. Patient will benefit from continued skilled PT services to address the following as well as improve endurance, strength, and safety with mobility.  Plan of Care Reviewed With: patient  Progress: improving  Outcome Evaluation: To optimize functional mobility and independence, skilled PT services needed to address mobility needs, strength, endurance, and balance impairments.  Continue plan of care for another 10 days.   Outcome Measures     Row Name 12/12/22 1100             How much help from another person do you currently need...    Turning from your back to your side while in flat bed without using bedrails? 4  -CS      Moving from lying on back to sitting on the side of a flat bed without bedrails? 4  -CS      Moving to and from a bed to a chair (including a wheelchair)? 3  -CS      Standing up from a chair using your arms (e.g., wheelchair, bedside chair)? 3  -CS      Climbing 3-5 steps with a railing? 3  -CS      To walk in hospital room? 3  -CS      AM-PAC 6 Clicks Score (PT) 20  -CS         Functional Assessment    Outcome Measure Options AM-PAC 6 Clicks Basic Mobility (PT)  -CS            User Key  (r) = Recorded By, (t) = Taken By, (c) = Cosigned By    Initials Name Provider Type    CS Christine Colleir, PT Physical Therapist                 Time Calculation:    PT Charges     Row Name 12/12/22 4495              Time Calculation    PT Received On 12/12/22  -CS      PT Goal Re-Cert Due Date 12/21/22  -CS         Timed Charges    83600 - PT Therapeutic Activity Minutes 8  -CS         Untimed Charges    PT Eval/Re-eval Minutes 15  -CS         Total Minutes    Timed Charges Total Minutes 8  -CS      Untimed Charges Total Minutes 15  -CS       Total Minutes 23  -CS            User Key  (r) = Recorded By, (t) = Taken By, (c) = Cosigned By    Initials Name Provider Type    CS Christine Collier, PT Physical Therapist              Therapy Charges for Today     Code Description Service Date Service Provider Modifiers Qty    81239115505  PT THERAPEUTIC ACT EA 15 MIN 12/12/2022 Christine Collier, PT GP 1    [id number] HC PT RE-EVAL ESTABLISHED PLAN 2 12/12/2022 Christine Collier, PT GP 1          PT G-Codes  Outcome Measure Options: AM-PAC 6 Clicks Basic Mobility (PT)  AM-PAC 6 Clicks Score (PT): 20  AM-PAC 6 Clicks Score (OT): 21    Christine Collier PT  12/12/2022

## 2022-12-12 NOTE — PLAN OF CARE
Goal Outcome Evaluation:  Plan of Care Reviewed With: patient        Progress: no change  Outcome Evaluation: Patient is alert and oriented and speaking with RN throughout shift. Patient has call light within reach and is educated and aware of how to use at this time. Patient shows no signs or symptoms of distress at this time and none are reported to RN during shift at this time. Patient has recieved PRN Xanax X2 for anxiety during shift.

## 2022-12-13 ENCOUNTER — READMISSION MANAGEMENT (OUTPATIENT)
Dept: CALL CENTER | Facility: HOSPITAL | Age: 57
End: 2022-12-13

## 2022-12-13 VITALS
DIASTOLIC BLOOD PRESSURE: 59 MMHG | TEMPERATURE: 97.5 F | RESPIRATION RATE: 20 BRPM | HEART RATE: 70 BPM | OXYGEN SATURATION: 95 % | SYSTOLIC BLOOD PRESSURE: 127 MMHG | HEIGHT: 62 IN | WEIGHT: 194.67 LBS | BODY MASS INDEX: 35.82 KG/M2

## 2022-12-13 PROBLEM — J96.22 ACUTE ON CHRONIC RESPIRATORY FAILURE WITH HYPOXIA AND HYPERCAPNIA: Status: RESOLVED | Noted: 2022-11-18 | Resolved: 2022-12-13

## 2022-12-13 PROBLEM — J44.1 COPD WITH ACUTE EXACERBATION: Status: RESOLVED | Noted: 2022-11-19 | Resolved: 2022-12-13

## 2022-12-13 PROBLEM — J96.21 ACUTE ON CHRONIC RESPIRATORY FAILURE WITH HYPOXIA AND HYPERCAPNIA: Status: RESOLVED | Noted: 2022-11-18 | Resolved: 2022-12-13

## 2022-12-13 LAB — GLUCOSE BLDC GLUCOMTR-MCNC: 154 MG/DL (ref 70–99)

## 2022-12-13 PROCEDURE — 97110 THERAPEUTIC EXERCISES: CPT

## 2022-12-13 PROCEDURE — 97116 GAIT TRAINING THERAPY: CPT

## 2022-12-13 PROCEDURE — 99238 HOSP IP/OBS DSCHRG MGMT 30/<: CPT | Performed by: INTERNAL MEDICINE

## 2022-12-13 PROCEDURE — 25010000002 KETOROLAC TROMETHAMINE PER 15 MG: Performed by: INTERNAL MEDICINE

## 2022-12-13 PROCEDURE — 82962 GLUCOSE BLOOD TEST: CPT

## 2022-12-13 PROCEDURE — 94799 UNLISTED PULMONARY SVC/PX: CPT

## 2022-12-13 PROCEDURE — 25010000002 ONDANSETRON PER 1 MG: Performed by: INTERNAL MEDICINE

## 2022-12-13 PROCEDURE — 25010000002 ENOXAPARIN PER 10 MG: Performed by: FAMILY MEDICINE

## 2022-12-13 PROCEDURE — 63710000001 INSULIN LISPRO (HUMAN) PER 5 UNITS: Performed by: INTERNAL MEDICINE

## 2022-12-13 RX ORDER — KETOROLAC TROMETHAMINE 15 MG/ML
15 INJECTION, SOLUTION INTRAMUSCULAR; INTRAVENOUS ONCE AS NEEDED
Status: COMPLETED | OUTPATIENT
Start: 2022-12-13 | End: 2022-12-13

## 2022-12-13 RX ADMIN — INSULIN LISPRO 2 UNITS: 100 INJECTION, SOLUTION INTRAVENOUS; SUBCUTANEOUS at 12:40

## 2022-12-13 RX ADMIN — BUTALBITAL, ACETAMINOPHEN AND CAFFEINE 1 CAPSULE: 300; 40; 50 CAPSULE ORAL at 11:17

## 2022-12-13 RX ADMIN — BUDESONIDE 0.25 MG: 0.25 INHALANT ORAL at 07:42

## 2022-12-13 RX ADMIN — ONDANSETRON 4 MG: 2 INJECTION INTRAMUSCULAR; INTRAVENOUS at 15:12

## 2022-12-13 RX ADMIN — ENOXAPARIN SODIUM 40 MG: 100 INJECTION SUBCUTANEOUS at 10:26

## 2022-12-13 RX ADMIN — ALPRAZOLAM 0.5 MG: 0.25 TABLET ORAL at 12:40

## 2022-12-13 RX ADMIN — IPRATROPIUM BROMIDE AND ALBUTEROL SULFATE 3 ML: 2.5; .5 SOLUTION RESPIRATORY (INHALATION) at 07:42

## 2022-12-13 RX ADMIN — ASPIRIN 81 MG CHEWABLE TABLET 81 MG: 81 TABLET CHEWABLE at 10:26

## 2022-12-13 RX ADMIN — LEVOTHYROXINE SODIUM 125 MCG: 125 TABLET ORAL at 06:12

## 2022-12-13 RX ADMIN — MONTELUKAST 10 MG: 10 TABLET, FILM COATED ORAL at 10:26

## 2022-12-13 RX ADMIN — SERTRALINE HYDROCHLORIDE 200 MG: 100 TABLET ORAL at 10:26

## 2022-12-13 RX ADMIN — IPRATROPIUM BROMIDE AND ALBUTEROL SULFATE 3 ML: 2.5; .5 SOLUTION RESPIRATORY (INHALATION) at 00:43

## 2022-12-13 RX ADMIN — KETOROLAC TROMETHAMINE 15 MG: 15 INJECTION, SOLUTION INTRAMUSCULAR; INTRAVENOUS at 17:21

## 2022-12-13 RX ADMIN — ARFORMOTEROL TARTRATE INHALATION 15 MCG: 15 SOLUTION RESPIRATORY (INHALATION) at 07:42

## 2022-12-13 RX ADMIN — ALPRAZOLAM 0.5 MG: 0.25 TABLET ORAL at 06:10

## 2022-12-13 RX ADMIN — DIVALPROEX SODIUM 125 MG: 125 CAPSULE, COATED PELLETS ORAL at 10:26

## 2022-12-13 RX ADMIN — ATENOLOL 50 MG: 50 TABLET ORAL at 10:26

## 2022-12-13 RX ADMIN — SPIRONOLACTONE 25 MG: 25 TABLET ORAL at 10:26

## 2022-12-13 RX ADMIN — IPRATROPIUM BROMIDE AND ALBUTEROL SULFATE 3 ML: 2.5; .5 SOLUTION RESPIRATORY (INHALATION) at 12:08

## 2022-12-13 NOTE — DISCHARGE SUMMARY
[facility]        HOSPITALIST  DISCHARGE SUMMARY    Patient Name: Nova Manuel  : 1965  MRN: [id number]    Date of Admission: 2022  Date of Discharge:  2022  Primary Care Physician: Dana Banks APRN    Consults     Date and Time Order Name Status Description    2022  9:03 AM Inpatient Pulmonology Consult Completed     2022 11:07 PM Hospitalist (on-call MD unless specified)            Active and Resolved Hospital Problems:  Active Hospital Problems    Diagnosis POA   • Restrictive lung disease [J98.4] Yes      Resolved Hospital Problems    Diagnosis POA   • **Acute on chronic respiratory failure with hypoxia and hypercapnia (HCC) [J96.21, J96.22] Yes   • COPD with acute exacerbation (HCC) [J44.1] Yes     Acute on chronic hypercarbic and hypoxemic respiratory failure requiring NIPPV.  Patient has home oxygen  Acute COPD exacerbation  Human metapneumovirus pneumonia  Medical noncompliance  Obesity with BMI: 37.42  Type 2 diabetes mellitus with hyperglycemia  Anxiety  Contraction alkalosis  Hypertension  Pain right knee due to DJD   Hospital Course     Hospital Course:  Nova Manuel is a 57 y.o. female  with severe COPD (FEV1: 13%), chronic hypoxemic and hypercapnic respiratory failure on 2 L O2 via nasal cannula and nocturnal NIPPV who presented to the ED with complaints of worsening shortness of breath.  Upon evaluation in ED patient was in respiratory distress and started on NIPPV with improvement in symptoms.  CXR did not demonstrate acute infiltrate or effusion.  CT chest negative for PE.  Hospitalist service contacted for further evaluation management.  Pulmonology consulted.  Systemic steroids, and inhaled bronchodilators and empiric antibiotics initiated.  Respiratory panel returned positive for human metapneumovirus.  Patient continued on BiPAP and respiratory function with some improvement.  Given patient's COPD severity palliative care was  consulted and decision made to continue with aggressive care measures.  Patient unable to tolerate much p.o. intake some core track was placed and nutrition initiated.  Patient's respiratory status has been improving and patient able to be weaned from BiPAP to Airvo and subsequently to nasal cannula and home trilogy at night.  Rehab referrals made by social work, pre-CERT pending at [facility].  She was denied by insurance to go to [facility].  [facility] filed appeal.  It may take up to 30 days to get the decision on appeal.  Patient meanwhile agreed to go home.     Interval Followup:   No acute events overnight.    Right knee hurts.  Voltaren gel helping her symptoms   remains on 4 L with good oxygen saturation   patient states that her breathing has been stable.  She continues to use incentive spirometer throughout the day.  She is very pleased with her progress.  She denies any chest pain, abdominal pain, nausea or vomiting.    Complaining of migraine headache and asking for IV Toradol      DISCHARGE Follow Up Recommendations for labs and diagnostics: PCP and pulmonary.  Continue home oxygen and trilogy.  Monitor blood sugar report to PCP.  Have BMP checked by PCP next week.      Day of Discharge     Vital Signs:  Temp:  [97.5 °F (36.4 °C)-98.7 °F (37.1 °C)] 98.6 °F (37 °C)  Heart Rate:  [60-84] 78  Resp:  [18-20] 20  BP: (110-140)/(43-60) 114/58  Flow (L/min):  [3] 3    Physical Exam:     Gen: No acute distress, conversant, pleasant, sitting up in chair bedside  HEENT: MMM, Atraumatic  Neck: Supple, Trachea midline  Resp: CTAB, improved bilateral rhonchi, no increase in work of breathing, equal chest rise bilaterally, no conversational dyspnea noted  Card: RRR, No m/r/g  Abd: Soft, Nontender, Nondistended, + bowel sounds  Ext: No cyanosis, No clubbing.  Warm mildly tender right knee without redness.  Neuro: CN II-XII grossly intact, No focal deficits appreciated  Psych: AAO x 3, Normal mood, Normal  affect     Discharge Details        Discharge Medications      Continue These Medications      Instructions Start Date   albuterol (2.5 MG/3ML) 0.083% nebulizer solution  Commonly known as: PROVENTIL   2.5 mg, Nebulization, Every 4 Hours PRN      ALPRAZolam 0.5 MG tablet  Commonly known as: XANAX   1 tablet, Oral, 4 Times Daily PRN      aspirin 81 MG chewable tablet   81 mg, Oral, Daily      atenolol 50 MG tablet  Commonly known as: TENORMIN   50 mg, Oral, 2 Times Daily      butalbital-acetaminophen-caffeine -40 MG per tablet  Commonly known as: FIORICET, ESGIC   1 tablet, Oral, Every 4 Hours PRN      butorphanol 10 MG/ML nasal spray  Commonly known as: STADOL   1 spray, Nasal, 2 Times Daily PRN      cyproheptadine 4 MG tablet  Commonly known as: PERIACTIN   4 mg, Oral, 2 Times Daily      divalproex 125 MG DR tablet  Commonly known as: DEPAKOTE   375 mg, Oral, 2 Times Daily      fish oil 1000 MG capsule capsule   1,000 mg, Oral, 2 Times Daily      Florajen Digestion capsule   1 capsule, Oral, Daily      furosemide 20 MG tablet  Commonly known as: LASIX   20 mg, Oral, Daily      ipratropium-albuterol 0.5-2.5 mg/3 ml nebulizer  Commonly known as: DUO-NEB   3 mL, Nebulization, Every 4 Hours      Lantus SoloStar 100 UNIT/ML injection pen  Generic drug: Insulin Glargine   13 Units, Subcutaneous, Daily      levothyroxine 125 MCG tablet  Commonly known as: SYNTHROID, LEVOTHROID   125 mcg, Oral, Daily      lisinopril 10 MG tablet  Commonly known as: PRINIVIL,ZESTRIL   10 mg, Oral, 3 Times Daily PRN      montelukast 10 MG tablet  Commonly known as: SINGULAIR   10 mg, Oral, Daily      multivitamin with minerals tablet tablet   1 tablet, Oral, Daily      nitroglycerin 0.4 MG SL tablet  Commonly known as: NITROSTAT   0.4 mg, Sublingual, Every 5 Minutes PRN      sertraline 100 MG tablet  Commonly known as: ZOLOFT   200 mg, Oral, Daily      spironolactone 25 MG tablet  Commonly known as: ALDACTONE   25 mg, Oral, 2 Times  Daily      vitamin B-12 1000 MCG tablet  Commonly known as: CYANOCOBALAMIN   1,000 mcg, Oral, Daily      Vitamin D 50 MCG (2000 UT) capsule   2,000 Units, Oral, Daily             Allergies   Allergen Reactions   • Acetaminophen Unknown - High Severity   • Adhesive Tape Unknown - High Severity   • Baclofen Unknown - High Severity   • Budesonide-Formoterol Fumarate Unknown - High Severity   • Cephalexin Unknown - High Severity   • Cephalosporins Unknown - High Severity   • Codeine Unknown - High Severity   • Fluticasone-Salmeterol Unknown - High Severity   • Hydrocodone-Acetaminophen Unknown - High Severity   • Hydroxyzine Hcl Unknown - High Severity   • Iron Unknown - High Severity   • Oxycodone Unknown - High Severity   • Oxycodone-Acetaminophen Unknown - High Severity   • Penicillins Unknown - High Severity   • Prednisone Unknown - High Severity   • Sulfamethoxazole Unknown - High Severity   • Sulfamethoxazole-Trimethoprim Unknown - High Severity   • Tiotropium Bromide Monohydrate Unknown - Low Severity   • Trimethoprim Unknown - High Severity   • Vancomycin Unknown - High Severity   • Zafirlukast Unknown - High Severity       Discharge Disposition:  Home or Self Care    Diet:  Diet Instructions     Diet: Consistent Carbohydrate, Cardiac      Discharge Diet:  Consistent Carbohydrate  Cardiac       Fluid Restriction per day: 1500 mL Fluid          Discharge Activity:   Activity Instructions     Activity as Tolerated            CODE STATUS:  Code Status and Medical Interventions:   Ordered at: 11/19/22 0613     Code Status (Patient has no pulse and is not breathing):    CPR (Attempt to Resuscitate)     Medical Interventions (Patient has pulse or is breathing):    Full Support     Release to patient:    Routine Release         Future Appointments   Date Time Provider Department Center   5/5/2023  2:15 PM Julianne Philip [place]N [facility]       Additional Instructions for the Follow-ups that You Need to Schedule      Discharge Follow-up with PCP   As directed       Currently Documented PCP:    Dana Banks APRN    PCP Phone Number:    [phone]     Follow Up Details: 2 weeks         Discharge Follow-up with Specified Provider: Dr. Infante; 1 Week   As directed      To: Dr. Infante    Follow Up: 1 Week               Pertinent  and/or Most Recent Results     PROCEDURES:   * Cannot find OR case *     LAB RESULTS:                              Brief Urine Lab Results     None        Microbiology Results (last 10 days)     ** No results found for the last 240 hours. **          CT Chest With Contrast Diagnostic    Result Date: 11/23/2022  Impression:   No pulmonary embolism.  No acute infiltrate.  No cardiac enlargement.  Atherosclerotic changes are noted, including involvement of the coronary arteries.     Please note that portions of this note were completed with a voice recognition program.  BOB NOVAK JR, MD       Electronically Signed and Approved By: BOB NOVAK JR, MD on 11/23/2022 at 21:50             XR Chest 1 View    Result Date: 11/30/2022  Impression:  No acute cardiopulmonary process identified.       ALEKSANDRA ALONSO MD       Electronically Signed and Approved By: ALEKSANDRA ALONSO MD on 11/30/2022 at 6:23             XR Chest 1 View    Result Date: 11/23/2022  Impression:   Probably no acute infiltrate is appreciated.     Please note that portions of this note were completed with a voice recognition program.  BOB NOVAK JR, MD       Electronically Signed and Approved By: BOB NOVAK JR, MD on 11/23/2022 at 6:36              XR Chest 1 View    Result Date: 11/18/2022  Impression:   No acute infiltrate is appreciated.    Please note that portions of this note were completed with a voice recognition program.  BOB NOVAK JR, MD       Electronically Signed and Approved By: BOB NOVAK JR, MD on 11/18/2022 at 22:46                        Results for orders placed during the hospital encounter of  11/18/22    Adult Transthoracic Echo Complete w/ Color, Spectral and Contrast if necessary per protocol    Interpretation Summary  •  Calculated left ventricular EF = 52.4%  •  Left ventricular wall thickness is consistent with mild concentric hypertrophy.  •  Left ventricular diastolic function is consistent with (grade I) impaired relaxation.      Imaging Results (All)     Procedure Component Value Units Date/Time    XR Chest 1 View [[id number]] Collected: 11/30/22 0623     Updated: 11/30/22 0626    Narrative:      PROCEDURE: XR CHEST 1 VW     COMPARISON: [facility], , XR CHEST 1 VW, 11/23/2022, 5:08.     INDICATIONS: hypoxia     FINDINGS:   LUNGS: Normal.  No significant pulmonary parenchymal abnormalities.    VASCULATURE: Normal.  Unremarkable pulmonary vasculature.    CARDIAC: Normal.  No cardiac silhouette abnormality or cardiomegaly.    MEDIASTINUM: Normal.  No visible mass or adenopathy.    PLEURA: Normal.  No effusion or pleural thickening.    BONES: Normal.  No fracture or visible bony lesion.    OTHER: A duo tube has its tip below the diaphragm.       Impression:       No acute cardiopulmonary process identified.                  ALEKSANDRA ALONSO MD         Electronically Signed and Approved By: ALEKSANDRA ALONSO MD on 11/30/2022 at 6:23                     CT Chest With Contrast Diagnostic [[id number]] Collected: 11/23/22 2150     Updated: 11/23/22 2153    Narrative:      PROCEDURE: CT CHEST W CONTRAST DIAGNOSTIC     COMPARISONS: 11/23/2022; 7/8/2019; 8/11/2014.     INDICATIONS: Shortness of breath; hypoxia; possible pulmonary embolism (PE).       TECHNIQUE: After obtaining the patient's consent, 857 CT/CTA images were obtained with non-ionic   intravenous contrast material.       PROTOCOL:   Standard pulmonary arteriogram CTA imaging protocol performed.      RADIATION:   Total DLP: 384 mGy*cm.    Automated exposure control was utilized to minimize radiation dose.   CONTRAST: 100 mL Isovue  370 I.V.  LABS:   eGFR: >60 mL/min/1.73m^2.     FINDINGS:   No pulmonary embolism is identified.  No acute infiltrate is seen.  There may be mild subsegmental   atelectasis and/or fibrosis in the lung bases.  Minimal, if any, emphysematous change of the lungs   is appreciated.  Atherosclerotic changes are seen, including involvement of the coronary arteries.    Probably no cardiac enlargement is seen.  The central tracheobronchial tree is well aerated without   filling defect.  No suspicious pulmonary nodules are identified.  No pleural or pericardial   effusion.  No gross evidence of thoracic aortic dissection or aneurysm.  The thoracic aorta is   diffusely atherosclerotic.  The great artery origins are also atherosclerotic.  The left vertebral   artery is thought to be dominant.  No enlarged mediastinal lymph nodes are appreciated.  Chronic   calcified granulomatous disease involves the chest and the abdomen.  There are postoperative   changes of the thyroid gland, especially on the right.  Please correlate clinically.  No acute   findings are seen in the partially imaged upper abdomen.  There is nonspecific nodularity of the   bilateral adrenal glands, seen on prior studies.  No acute fracture or aggressive osseous lesion.    Degenerative changes are seen throughout the imaged spine.  There is slight motion artifact on the   study.       Impression:         No pulmonary embolism.  No acute infiltrate.  No cardiac enlargement.  Atherosclerotic changes are   noted, including involvement of the coronary arteries.             Please note that portions of this note were completed with a voice recognition program.     BOB NOVAK JR, MD         Electronically Signed and Approved By: BOB NOVAK JR, MD on 11/23/2022 at 21:50                     XR Chest 1 View [[id number]] Collected: 11/23/22 0636     Updated: 11/23/22 0639    Narrative:      PROCEDURE: XR CHEST 1 VW     COMPARISON: 11/18/2022.      INDICATIONS: hypoxia     FINDINGS:  2 AP semi-upright portable chest radiographs were obtained.  No cardiac enlargement is   seen.  No acute infiltrate is appreciated.  No pleural effusion or pneumothorax is identified.    There is emphysematous contour of the lungs.  Chronic calcified granulomatous disease involves the   chest.  Atelectasis and/or fibrosis may involve the left lung base.  External artifacts obscure   detail.  The thoracic aorta is atherosclerotic.  Surgical clips are projected over the base of the   neck and may represent prior right-sided thyroidectomy.  Please correlate with the surgical   history.  No significant interval change is seen since the prior study (or studies).       Impression:        Probably no acute infiltrate is appreciated.              Please note that portions of this note were completed with a voice recognition program.     BOB NOVAK JR, MD         Electronically Signed and Approved By: BOB NOVAK JR, MD on 11/23/2022 at 6:36                        XR Chest 1 View [[id number]] Collected: 11/18/22 2246     Updated: 11/18/22 2249    Narrative:      PROCEDURE: XR CHEST 1 VW     COMPARISONS: 12/31/2019; 8/11/2014.     INDICATIONS: 57-year-old female with shortness of breath.     FINDINGS:  A single AP (or PA) upright portable chest radiograph was performed.  Borderline cardiac   enlargement is seen.  No acute infiltrate is appreciated.  No pleural effusion or pneumothorax is   identified.  Chronic calcified granulomatous disease involves the chest.  External artifacts   obscure detail.  The thoracic aorta is atherosclerotic.  There may be mild pleural-parenchymal   scarring in the lung bases, especially on the left.  No significant interval change is seen since   the prior study (or studies).       Impression:        No acute infiltrate is appreciated.           Please note that portions of this note were completed with a voice recognition program.     BOB NOVAK  JR VILLALPANDO         Electronically Signed and Approved By: BOB NOVAK JR, MD on 11/18/2022 at 22:46                               Labs Pending at Discharge:          Time spent on Discharge including face to face service: Less than 30 minutes  Part of this note may be an electronic transcription/translation of spoken language to printed text using the Dragon Dictation System.     TElectronically signed by Rodrigo Espinosa MD, 12/13/22, 4:40 PM EST.

## 2022-12-13 NOTE — PLAN OF CARE
Goal Outcome Evaluation:           Progress: improving  Outcome Evaluation: Pt AAOx4, pleasant. c/o SOA with exertion still, pt o2 level at baseline. Pt ambulates well. Pt is in no apparent distress at this time, denies any needs, call light in reach. Pt given toradol x1 before d/c for headache per provider order. Pt nephew at bedside awaiting pt d/c, pt will not be driving.

## 2022-12-13 NOTE — CONSULTS
Discharge Planning Assessment   Wendie     Patient Name: oNva Manuel  MRN: [id number]  Today's Date: 12/13/2022    Admit Date: 11/18/2022    Plan: [facility] notified that expedited appeal could take up to 30 days to receive an answer. [facility]U discussed with pt to see if there was alternative facilities she would be agreeable in going to. Pt denied and stated she will return home at this time. States she has [employer] and all needed DME at home. MD notified.     Expected Discharge Date and Time     Expected Discharge Date Expected Discharge Time    Dec 13, 2022           NISHA Anglin

## 2022-12-13 NOTE — THERAPY TREATMENT NOTE
Acute Care - Physical Therapy Treatment Note  [facility]     Patient Name: Nova Manuel  : 1965  MRN: [id number]  Today's Date: 2022      Visit Dx:     ICD-10-CM ICD-9-CM   1. Acute on chronic respiratory failure with hypoxia and hypercapnia (HCC)  J96.21 518.84    J96.22 786.09     799.02   2. COPD with acute exacerbation (HCC)  J44.1 491.21   3. Oropharyngeal dysphagia  R13.12 787.22   4. Decreased activities of daily living (ADL)  Z78.9 V49.89   5. Difficulty walking  R26.2 719.7     Patient Active Problem List   Diagnosis   • Centrilobular emphysema (HCC)   • Chronic respiratory failure with hypoxia and hypercapnia (HCC)   • Acute on chronic respiratory failure with hypoxia and hypercapnia (HCC)   • COPD with acute exacerbation (HCC)   • Restrictive lung disease   • Allergic rhinitis   • Anemia, pernicious   • Anxiety   • Asthma   • Diabetes (HCC)   • Migraine   • Mitral valve prolapse   • Morbid obesity with body mass index of 40.0-44.9 in adult (HCC)     Past Medical History:   Diagnosis Date   • Anxiety    • Asthma    • CHF (congestive heart failure) (HCC)    • COPD (chronic obstructive pulmonary disease) (HCC)    • Diabetes mellitus (HCC)    • Disease of thyroid gland    • Hyperlipidemia    • Hypertension      Past Surgical History:   Procedure Laterality Date   •  SECTION     • CHOLECYSTECTOMY     • HYSTERECTOMY       PT Assessment (last 12 hours)     PT Evaluation and Treatment     Row Name 22 6121          Physical Therapy Time and Intention    Subjective Information no complaints (P)   -GT     Document Type therapy note (daily note) (P)   -GT     Mode of Treatment individual therapy;physical therapy (P)   -GT     Patient Effort good (P)   -GT     Symptoms Noted During/After Treatment shortness of breath;fatigue (P)   -GT     Row Name 22 1530          General Information    Patient Observations alert;cooperative;agree to therapy (P)   -GT     Row Name 22 2479           Transfers    Transfers sit-stand transfer;stand-sit transfer (P)   -GT     Row Name 12/13/22 1208          Sit-Stand Transfer    Sit-Stand Stokes (Transfers) standby assist (P)   -GT     Assistive Device (Sit-Stand Transfers) walker, front-wheeled (P)   -GT     Row Name 12/13/22 1208          Stand-Sit Transfer    Stand-Sit Stokes (Transfers) standby assist (P)   -GT     Assistive Device (Stand-Sit Transfers) walker, front-wheeled (P)   -GT     Row Name 12/13/22 1208          Gait/Stairs (Locomotion)    Gait/Stairs Locomotion gait/ambulation assistive device (P)   -GT     Stokes Level (Gait) standby assist (P)   -GT     Assistive Device (Gait) walker, front-wheeled (P)   -GT     Distance in Feet (Gait) 30 (P)   -GT     Pattern (Gait) step-through (P)   -GT     Deviations/Abnormal Patterns (Gait) lilly decreased;gait speed decreased;stride length decreased (P)   -GT     Bilateral Gait Deviations forward flexed posture (P)   -GT     Comment, (Gait/Stairs) Patient ambulated 10 feet 3x with rest breaks between ambulation. (P)   -GT     Row Name 12/13/22 1208          Motor Skills    Therapeutic Exercise hip;knee;ankle (P)   -GT     Row Name 12/13/22 1208          Hip (Therapeutic Exercise)    Hip (Therapeutic Exercise) strengthening exercise (P)   -GT     Hip Strengthening (Therapeutic Exercise) bilateral;aBduction;aDduction;marching while standing;10 repetitions (P)   -GT     Row Name 12/13/22 1208          Knee (Therapeutic Exercise)    Knee (Therapeutic Exercise) strengthening exercise (P)   -GT     Knee Strengthening (Therapeutic Exercise) bilateral;marching while standing;10 repetitions (P)   -GT     Row Name 12/13/22 1208          Ankle (Therapeutic Exercise)    Ankle (Therapeutic Exercise) strengthening exercise (P)   -GT     Ankle Strengthening (Therapeutic Exercise) bilateral;dorsiflexion;plantarflexion;10 repetitions (P)   -GT     Row Name 12/13/22 1300          Progress Summary (PT)     Progress Toward Functional Goals (PT) progress toward functional goals is good (P)   -GT     Daily Progress Summary (PT) Patient continues to be short of breath and fatigued upon exertion requiring frequent rest between mobility. Her heart rate is also easily increased with functional mobility, but does return to baseline quickly. (P)   -GT           User Key  (r) = Recorded By, (t) = Taken By, (c) = Cosigned By    Initials Name Provider Type    GT Sangeetha Munguia, PT Student PT Student                Physical Therapy Education     Title: PT OT SLP Therapies (Done)     Topic: Physical Therapy (Done)     Point: Mobility training (Done)     Learning Progress Summary           Patient Acceptance, E, VU,DU by RF at 12/7/2022 0856    Acceptance, E, VU,DU by RF at 12/6/2022 0856    Acceptance, E, VU,DU by RF at 12/5/2022 0909    Acceptance, E,TB, VU,NR by EV at 12/4/2022 0018    Acceptance, E, VU,DU by RF at 12/2/2022 0838    Acceptance, E,TB, VU by AD at 11/29/2022 1311                   Point: Home exercise program (Done)     Learning Progress Summary           Patient Acceptance, E, VU,DU by RF at 12/7/2022 0856    Acceptance, E, VU,DU by RF at 12/6/2022 0856    Acceptance, E, VU,DU by RF at 12/5/2022 0909    Acceptance, E,TB, VU,NR by EV at 12/4/2022 0018    Acceptance, E, VU,DU by RF at 12/2/2022 0838    Acceptance, E,TB, VU by JR at 11/30/2022 1433                   Point: Body mechanics (Done)     Learning Progress Summary           Patient Acceptance, E, VU,DU by RF at 12/7/2022 0856    Acceptance, E, VU,DU by RF at 12/6/2022 0856    Acceptance, E, VU,DU by RF at 12/5/2022 0909    Acceptance, E,TB, VU,NR by EV at 12/4/2022 0018    Acceptance, E, VU,DU by RF at 12/2/2022 0838    Acceptance, E,TB, VU by AD at 11/29/2022 1311                   Point: Precautions (Done)     Learning Progress Summary           Patient Acceptance, E, VU,DU by RF at 12/7/2022 0856    Acceptance, KAREN MCCANN DU by RF at 12/6/2022 0856     Acceptance, E, VU,DU by RF at 12/5/2022 0909    Acceptance, E,TB, VU,NR by EV at 12/4/2022 0018    Acceptance, E, VU,DU by RF at 12/2/2022 0838    Acceptance, E,TB, VU by AD at 11/29/2022 1311                               User Key     Initials Effective Dates Name Provider Type Discipline    EV 06/16/21 -  Shell Landon, RN Registered Nurse Nurse     03/14/22 -  Margarita Lebron, RN Registered Nurse Nurse    RF 01/19/22 -  Kalyan Smalls, RN Registered Nurse Nurse    AD 10/18/22 -  Jcarlos Min, PT Student PT Student PT              PT Recommendation and Plan     Progress Summary (PT)  Progress Toward Functional Goals (PT): (P) progress toward functional goals is good  Daily Progress Summary (PT): (P) Patient continues to be short of breath and fatigued upon exertion requiring frequent rest between mobility. Her heart rate is also easily increased with functional mobility, but does return to baseline quickly.   Outcome Measures     Row Name 12/13/22 1300 12/12/22 1100          How much help from another person do you currently need...    Turning from your back to your side while in flat bed without using bedrails? 4 (P)   -GT 4  -CS     Moving from lying on back to sitting on the side of a flat bed without bedrails? 4 (P)   -GT 4  -CS     Moving to and from a bed to a chair (including a wheelchair)? 3 (P)   -GT 3  -CS     Standing up from a chair using your arms (e.g., wheelchair, bedside chair)? 3 (P)   -GT 3  -CS     Climbing 3-5 steps with a railing? 2 (P)   -GT 3  -CS     To walk in hospital room? 3 (P)   -GT 3  -CS     AM-PAC 6 Clicks Score (PT) 19 (P)   -GT 20  -CS        Functional Assessment    Outcome Measure Options AM-PAC 6 Clicks Basic Mobility (PT) (P)   -GT AM-PAC 6 Clicks Basic Mobility (PT)  -CS           User Key  (r) = Recorded By, (t) = Taken By, (c) = Cosigned By    Initials Name Provider Type    CS Christine Collier, PT Physical Therapist    GT Sangeetha Munguia, PT Student PT Student                  Time Calculation:    PT Charges     Row Name 12/13/22 1312             Time Calculation    PT Received On 12/13/22 (P)   -GT         Timed Charges    84350 - PT Therapeutic Exercise Minutes 10 (P)   -GT      74496 - Gait Training Minutes  13 (P)   -GT         Total Minutes    Timed Charges Total Minutes 23 (P)   -GT       Total Minutes 23 (P)   -GT            User Key  (r) = Recorded By, (t) = Taken By, (c) = Cosigned By    Initials Name Provider Type    GT Sangeetha Munguia, PT Student PT Student              Therapy Charges for Today     Code Description Service Date Service Provider Modifiers Qty    42133929534 HC PT THER PROC EA 15 MIN 12/13/2022 Sangeetha Munguia, PT Student GP 1    49942526863 HC GAIT TRAINING EA 15 MIN 12/13/2022 Sangeetha Munguia, PT Student GP 1          PT G-Codes  Outcome Measure Options: (P) AM-PAC 6 Clicks Basic Mobility (PT)  AM-PAC 6 Clicks Score (PT): (P) 19  AM-PAC 6 Clicks Score (OT): 21    Sangeetha Munguia PT Student  12/13/2022

## 2022-12-13 NOTE — PLAN OF CARE
Goal Outcome Evaluation:  Plan of Care Reviewed With: patient        Progress: improving  Outcome Evaluation: AOx4. No changes in patient condition. VS WDL. Denies nausea, pain or discomfort at this shift. No signs of distress verbalized or noted at this time. Will conitnue to monitor.

## 2022-12-14 NOTE — OUTREACH NOTE
Prep Survey    Flowsheet Row Responses   [facility] facility patient discharged from? [facility]   Is LACE score < 7 ? No   Eligibility Readm Mgmt   Discharge diagnosis Acute on chronic respiratory failure with hypoxia and hypercapnia    Does the patient have one of the following disease processes/diagnoses(primary or secondary)? COPD   Does the patient have Home health ordered? No   Is there a DME ordered? No   Prep survey completed? Yes          MICHAEL JAUREGUI - Registered Nurse

## 2022-12-16 ENCOUNTER — READMISSION MANAGEMENT (OUTPATIENT)
Dept: CALL CENTER | Facility: HOSPITAL | Age: 57
End: 2022-12-16

## 2022-12-16 NOTE — OUTREACH NOTE
COPD/PN Week 1 Survey    Flowsheet Row Responses   [facility] facility patient discharged from? [facility]   Does the patient have one of the following disease processes/diagnoses(primary or secondary)? COPD   Week 1 attempt successful? No   Call start time 1011   Unsuccessful attempts Attempt 1  [Attempted pt-no answer.  Brother states he does not have a current update on pt's status.  ]   Discharge diagnosis Acute on chronic respiratory failure with hypoxia and hypercapnia           SINCERE H - Registered Nurse

## 2022-12-21 ENCOUNTER — READMISSION MANAGEMENT (OUTPATIENT)
Dept: CALL CENTER | Facility: HOSPITAL | Age: 57
End: 2022-12-21

## 2022-12-21 NOTE — OUTREACH NOTE
"COPD/PN Week 1 Survey    Flowsheet Row Responses   [facility] [place] patient discharged from? [facility]   Does the patient have one of the following disease processes/diagnoses(primary or secondary)? COPD   Week 1 attempt successful? Yes   Call start time 1553   Call end time 1602   Discharge diagnosis Acute on chronic respiratory failure with hypoxia and hypercapnia    Meds reviewed with patient/caregiver? Yes   Does the patient have all medications ordered at discharge? N/A   Is the patient taking all medications as directed (includes completed medication regime)? Yes   Does the patient have a primary care provider?  Yes   Comments regarding PCP Provider has visited pt since hospital    Has the patient kept scheduled appointments due by today? Yes   Pulse Ox monitoring Intermittent   O2 Sat comments \"running in the 90's\" with oxygen    Psychosocial issues? No   Did the patient receive a copy of their discharge instructions? Yes   Nursing interventions Reviewed instructions with patient   What is the patient's perception of their health status since discharge? New symptoms unrelated to diagnosis  [Feet are swollen]   Are the patient's immunizations up to date?  No  [Pt states, \"I don't get that stuff\"]   If the patient is a current smoker, are they able to teach back resources for cessation? Not a smoker   Is the patient/caregiver able to teach back the hierarchy of who to call/visit for symptoms/problems? PCP, Specialist, Home health nurse, Urgent Care, ED, 911 Yes   Is the patient able to teach back COPD zones? No   Nursing interventions Education provided on various zones   Patient reports what zone on this call? Green Zone   Green Zone Reports doing well, Breathing without shortness of breath, Usual activity and exercise level, Slept well last night, Appetite is good   Green Zone interventions: Take daily medications, Use oxygen as prescribed, At all times avoid cigarette smoking, vaping and inhaled irritants "   Week 1 call completed? Yes   Revoked No further contact(revokes)-requires comment   Is the patient interested in additional calls from an ambulatory ?  NOTE:  applies to high risk patients requiring additional follow-up. No   Graduated/Revoked comments Pt did not want further calls          YONI GILMAN - Registered Nurse

## 2022-12-23 NOTE — PROGRESS NOTES
Primary Care Provider  Dana Banks APRN     Referring Provider  No ref. provider found     Chief Complaint  COPD, Asthma, Cough, Shortness of Breath, Wheezing, and Hospital Follow Up Visit    Subjective          History of Presenting Illness  Patient is a 57-year-old female who was recently hospitalized at [facility] from 11/18/2022 to 12/13/2022 for COPD with acute exacerbation acute on chronic hypoxic and hypercapnic respiratory failure who presents to the COPD clinic today.  Patient sisters present with the patient in the office today.  Per discharge summary report, patient has severe COPD with an FEV1 of 13% predicted, chronic hypoxemic and hypercapnic respiratory failure on 2 L of oxygen per minute via nasal cannula and nocturnal NIPPV who presented the emergency room with complaints of worsening shortness of breath.  Evaluation the emergency room show that patient was in respiratory distress and started on NIPPV with improvement in symptoms.  Patient had a chest x-ray completed did not demonstrate any acute infiltrate or effusion.  Chest CT was negative for PE.  Hospitalist service was contacted for further evaluation and management.  Pulmonology was consulted.  Patient was started on systemic steroids, inhaler bronchodilators, and empiric antibiotics were also initiated.  Respiratory panel returned positive for human metapneumovirus.  Patient was continued on BiPAP and respiratory function did improve.  Given patient's severe COPD palliative care was consulted and decision made to continue with aggressive care measures.  Patient was unable to tolerate much oral intake  some core track was placed and nutrition initiated. Patient's respiratory status has been improving and patient was able to be weaned from BiPAP to Airvo and subsequently to nasal cannula and home trilogy at night per discharge summary report. Per discharge summary report, rehab referrals made by social work, pre-CERT  pending at [facility]. Patient was denied by insurance to go to [facility] per discharge summary report.  Patient was discharged home.  Patient states that since hospital stay he is feeling better.  Patient states that she does get short of breath that is worse with exertion, moderate in severity, and improved with rest.  Patient is on 2 to 3 L of oxygen per minute via nasal cannula continuously.  Patient states that she is using her trilogy machine at night and with naps.  Patient states that she is taking DuoNeb nebulizer treatments as needed.  Patient states that she was receiving Pulmicort nebulizer treatments during hospital stay and she felt they helped her breathing significantly and would would like a prescription for Pulmicort nebulizer treatments.  Patient states that she does have palliative care coming to her home.  Patient is a former cigarette smoker and quit smoking in 2007. Patient denies fever, chills, night sweats, swollen glands in the head and neck, unintentional weight loss, hemoptysis, purulent sputum production, dysphagia, chest pain, palpitations, chest tightness, abdominal pain, nausea, vomiting, and diarrhea.  Patient also denies any changes in sense of taste and/or smell, sore throat, any other coronavirus or flu-like symptoms.  Patient denies any leg swelling, orthopnea, paroxysmal nocturnal dyspnea.  Patient is able to perform activities of daily living.        Review of Systems   Constitutional: Negative for activity change, appetite change, chills, diaphoresis, fatigue, fever, unexpected weight gain and unexpected weight loss.        Negative for Insomnia   HENT: Negative for congestion (Nasal), mouth sores, nosebleeds, postnasal drip, sore throat, swollen glands and trouble swallowing.         Negative for Thrush  Negative for Hoarseness  Negative for Allergies/Hay Fever  Negative for Recent Head injury  Negative for Ear Fullness  Negative for Nasal or Sinus pain  Negative for Dry  lips  Negative for Nasal discharge   Respiratory: Positive for cough, shortness of breath and wheezing (intermittent). Negative for apnea and chest tightness.         Negative for Hemoptysis  Negative for Pleuritic pain   Cardiovascular: Negative for chest pain, palpitations and leg swelling.        Negative for Claudication  Negative for Cyanosis  Negative for Dyspnea on exertion   Gastrointestinal: Negative for abdominal pain, diarrhea, nausea, vomiting and GERD.   Musculoskeletal: Negative for joint swelling and myalgias.        Negative for Joint pain  Negative for Joint stiffness   Skin: Negative for color change, dry skin, pallor and rash.   Neurological: Negative for syncope, weakness and headache.   Hematological: Negative for adenopathy. Does not bruise/bleed easily.        History reviewed. No pertinent family history.     Social History     Socioeconomic History   • Marital status: Single   Tobacco Use   • Smoking status: Former     Packs/day: 0.50     Years: 20.00     Pack years: 10.00     Types: Cigarettes     Quit date:      Years since quittin.0   • Smokeless tobacco: Never   Vaping Use   • Vaping Use: Never used   Substance and Sexual Activity   • Alcohol use: Defer   • Drug use: Defer   • Sexual activity: Defer        Past Medical History:   Diagnosis Date   • Anxiety    • Asthma    • CHF (congestive heart failure) (Formerly [facility])    • COPD (chronic obstructive pulmonary disease) (Formerly [facility])    • Diabetes mellitus (Formerly [facility])    • Disease of thyroid gland    • Hyperlipidemia    • Hypertension           There is no immunization history on file for this patient.    Allergies   Allergen Reactions   • Acetaminophen Unknown - High Severity   • Adhesive Tape Unknown - High Severity   • Baclofen Unknown - High Severity   • Budesonide-Formoterol Fumarate Unknown - High Severity   • Cephalexin Unknown - High Severity   • Cephalosporins Unknown - High Severity   • Codeine Unknown - High Severity   • Fluticasone-Salmeterol  Unknown - High Severity   • Hydrocodone-Acetaminophen Unknown - High Severity   • Hydroxyzine Hcl Unknown - High Severity   • Iron Unknown - High Severity   • Oxycodone Unknown - High Severity   • Oxycodone-Acetaminophen Unknown - High Severity   • Penicillins Unknown - High Severity   • Prednisone Unknown - High Severity   • Sulfamethoxazole Unknown - High Severity   • Sulfamethoxazole-Trimethoprim Unknown - High Severity   • Tiotropium Bromide Monohydrate Unknown - Low Severity   • Trimethoprim Unknown - High Severity   • Vancomycin Unknown - High Severity   • Zafirlukast Unknown - High Severity          Current Outpatient Medications:   •  ALPRAZolam (XANAX) 0.5 MG tablet, Take 1 tablet by mouth 4 (Four) Times a Day As Needed., Disp: , Rfl:   •  aspirin 81 MG chewable tablet, Chew 81 mg Daily., Disp: , Rfl:   •  atenolol (TENORMIN) 50 MG tablet, Take 50 mg by mouth 2 (Two) Times a Day., Disp: , Rfl:   •  butalbital-acetaminophen-caffeine (FIORICET, ESGIC) -40 MG per tablet, Take 1 tablet by mouth Every 4 (Four) Hours As Needed for Headache., Disp: , Rfl:   •  butorphanol (STADOL) 10 MG/ML nasal spray, 1 spray into the nostril(s) as directed by provider 2 (Two) Times a Day As Needed (Alternating Nostrils; Administer in one nostril)., Disp: , Rfl:   •  Cholecalciferol (Vitamin D) 50 MCG (2000 UT) capsule, Take 2,000 Units by mouth Daily., Disp: , Rfl:   •  cyproheptadine (PERIACTIN) 4 MG tablet, Take 4 mg by mouth 2 (Two) Times a Day., Disp: , Rfl:   •  divalproex (DEPAKOTE) 125 MG DR tablet, Take 375 mg by mouth 2 (Two) Times a Day., Disp: , Rfl:   •  furosemide (LASIX) 20 MG tablet, Take 20 mg by mouth Daily., Disp: , Rfl:   •  ipratropium-albuterol (DUO-NEB) 0.5-2.5 mg/3 ml nebulizer, Take 3 mL by nebulization Every 4 (Four) Hours., Disp: , Rfl:   •  Lantus SoloStar 100 UNIT/ML injection pen, Inject 13 Units under the skin into the appropriate area as directed Daily., Disp: , Rfl:   •  levothyroxine  (SYNTHROID, LEVOTHROID) 125 MCG tablet, Take 125 mcg by mouth Daily., Disp: , Rfl:   •  lisinopril (PRINIVIL,ZESTRIL) 10 MG tablet, Take 10 mg by mouth 3 (Three) Times a Day As Needed (High Blood Pressure)., Disp: , Rfl:   •  montelukast (SINGULAIR) 10 MG tablet, Take 10 mg by mouth Daily., Disp: , Rfl:   •  multivitamin with minerals tablet tablet, Take 1 tablet by mouth Daily., Disp: , Rfl:   •  nitroglycerin (NITROSTAT) 0.4 MG SL tablet, Place 0.4 mg under the tongue Every 5 (Five) Minutes As Needed., Disp: , Rfl:   •  Omega-3 Fatty Acids (fish oil) 1000 MG capsule capsule, Take 1,000 mg by mouth 2 (Two) Times a Day., Disp: , Rfl:   •  Probiotic Product (Florajen Digestion) capsule, Take 1 capsule by mouth Daily., Disp: , Rfl:   •  sertraline (ZOLOFT) 100 MG tablet, Take 200 mg by mouth Daily., Disp: , Rfl:   •  spironolactone (ALDACTONE) 25 MG tablet, Take 25 mg by mouth 2 (Two) Times a Day., Disp: , Rfl:   •  budesonide (Pulmicort) 0.25 MG/2ML nebulizer solution, Take 2 mL by nebulization 2 (Two) Times a Day for 30 days. Rinse mouth out after each use, Disp: 120 mL, Rfl: 2  •  vitamin B-12 (CYANOCOBALAMIN) 1000 MCG tablet, Take 1,000 mcg by mouth Daily., Disp: , Rfl:      Objective     Physical Exam  Vital Signs:   Obese, WDWN, Alert, NAD.  Patient sitting in a wheelchair.   HEENT:  PERRL, EOMI.  OP, nares clear, no sinus tenderness  Neck:  Supple, no JVD, no thyromegaly.  Lymph: no axillary, cervical, supraclavicular lymphadenopathy noted bilaterally  Chest:   Diminished breath sounds bilaterally. No wheezes, rales, or rhonchi appreciated.  Normal work of breathing noted.  Patient is able speak full sentences without difficulty.  Patient is on 3 L of oxygen per minute via nasal cannula.  CV: RRR, no MGR, pulses 2+, equal.  Abd:  Soft, NT, ND, + BS, no HSM  EXT:  no clubbing, no cyanosis, no edema, no joint tenderness  Neuro:  A&Ox3, CN grossly intact, no focal deficits.  Skin: No rashes or lesions noted.    BP  127/52 (BP Location: Left arm, Patient Position: Sitting, Cuff Size: Large Adult)   Pulse 88   Temp 98 °F (36.7 °C) (Tympanic)   Resp 20   Ht 157.5 cm (62.01\")   Wt 89.4 kg (197 lb)   SpO2 96% Comment: 3 liters  BMI 36.02 kg/m²         Result Review :   I have reviewed discharge summary and imaging study reports from recent hospital stay.  See scanned reports.    Procedures:         Assessment and Plan      Assessment:  1. Acute exacerbation of COPD with recent hospitalization, FEV1 13%.  2. Human metapneumovirus PNA with recent hospitalization.  3. Non-compliant with medication recommendations.  4. Acute on chronic hypoxic hypercapnic respiratory failure: home trilogy and supplemental oxygen.   5. Obesity: BMI 36.0.  6.  Tobacco abuse of cigarettes in remission.      Plan:  1.  COPD action plan discussed with the patient in the office today.  Additional education also provided by nurse navigator in the office today including COPD management, breathing exercises, inhaler use, and continued smoking cessation, etc.   2. Patient states that she was receiving Pulmicort nebulizer treatments during hospital stay and she felt they helped her breathing significantly and would like a prescription for Pulmicort nebulizer treatments.  Patient does have an allergy to steroids listed on her allergy profile.  Patient states that she tolerated Pulmicort nebulizer treatments during hospital without any reaction.  Will send a prescription over for Pulmicort nebulizer treatments at the same lower dose that she was given during the hospital stay at 0.25 MG/2ML twice daily.  Patient is advised to rinse her mouth out after each use.  3.  Continue DuoNeb nebulizer treatments as needed.  4.  Continue oxygen to keep SPO2 at 89% and above.  5.  Continue NIPPV oxygen bled in at current settings at night and with naps and clean mask and tubing daily.  6.  Continue Singulair.  7.  Will have our nurse navigator place a referral for the   patient to go to pulmonary rehab.    8.  I counseled the patient on diet and exercise. Patient's BMI and weight were discussed.  The patient was counseled on initiating and intensifying attempts to lose weight.  Patient was counseled about the risks of obesity and advised to decrease caloric intake by 500 calories a day, eat three small meals a day and advised to minimize snacking.  I recommended 30-60 minutes of daily exercise.  9.  Vaccination status:  patient declines flu, pneumonia, and COVID-19 vaccinations.  Discussed with patient the benefits of vaccination including decreased risk of severe illness, hospitalization and death related to flu, pneumonia, and COVID-19.  Patient verbalized understanding and will consider getting vaccinated.  Patient is advised to continue to follow CDC recommendations such as social distancing, wearing a mask, and washing hands for least 20 seconds.  10.  Smoking status: Patient is a former cigarette smoker.  11.  Patient to call the office, 911, or go to the ER with new or worsening symptoms.  12.  Follow-up in 4 to 6 weeks, sooner if needed.      I spent 44 minutes caring for Nova on this date of service. This time includes time spent by me in the following activities:preparing for the visit, reviewing tests, obtaining and/or reviewing a separately obtained history, performing a medically appropriate examination and/or evaluation , counseling and educating the patient/family/caregiver, ordering medications, tests, or procedures, referring and communicating with other health care professionals , documenting information in the medical record, independently interpreting results and communicating that information with the patient/family/caregiver and care coordination    Follow Up   Return for 4-6 weeks with Dr. Infante. Pt is requesting to see him.  Patient was given instructions and counseling regarding her condition or for health maintenance advice. Please see specific  information pulled into the AVS if appropriate.

## 2022-12-27 ENCOUNTER — TELEPHONE (OUTPATIENT)
Dept: NEUROLOGY | Facility: CLINIC | Age: 57
End: 2022-12-27

## 2022-12-27 NOTE — TELEPHONE ENCOUNTER
11:30 is not an appointment time as that was a work in due to weather. Spoke with Leighann and she will call patient. 11:00 New patient spot soonest available in 01/23 or patient could come in much earlier @ 3:45 spot.

## 2022-12-27 NOTE — TELEPHONE ENCOUNTER
Caller: Nova Manuel    Relationship: Self    Best call back number: [phone]    What is the best time to reach you: ANY    Who are you requesting to speak with (clinical staff, provider,  specific staff member): DARREL    Do you know the name of the person who called: DARREL    What was the call regarding:PATIENT TELEPHONED TO ADVISE SHE CAN TAKE THE APPT ON WEDS. 1/11/23 @ 11:30AM    PLEASE CALL HER BACK TO CONFIRM     THANK YOU     Do you require a callback: YES

## 2023-01-05 ENCOUNTER — OFFICE VISIT (OUTPATIENT)
Dept: PULMONOLOGY | Facility: CLINIC | Age: 58
End: 2023-01-05
Payer: COMMERCIAL

## 2023-01-05 ENCOUNTER — TELEPHONE (OUTPATIENT)
Dept: PULMONOLOGY | Facility: CLINIC | Age: 58
End: 2023-01-05

## 2023-01-05 VITALS
SYSTOLIC BLOOD PRESSURE: 127 MMHG | HEIGHT: 62 IN | HEART RATE: 88 BPM | WEIGHT: 197 LBS | BODY MASS INDEX: 36.25 KG/M2 | DIASTOLIC BLOOD PRESSURE: 52 MMHG | OXYGEN SATURATION: 96 % | TEMPERATURE: 98 F | RESPIRATION RATE: 20 BRPM

## 2023-01-05 DIAGNOSIS — J44.9 CHRONIC OBSTRUCTIVE PULMONARY DISEASE, UNSPECIFIED COPD TYPE: Primary | ICD-10-CM

## 2023-01-05 DIAGNOSIS — E66.9 OBESITY (BMI 30-39.9): ICD-10-CM

## 2023-01-05 DIAGNOSIS — J96.21 ACUTE ON CHRONIC RESPIRATORY FAILURE WITH HYPOXIA AND HYPERCAPNIA: ICD-10-CM

## 2023-01-05 DIAGNOSIS — F17.201 TOBACCO ABUSE, IN REMISSION: ICD-10-CM

## 2023-01-05 DIAGNOSIS — J96.22 ACUTE ON CHRONIC RESPIRATORY FAILURE WITH HYPOXIA AND HYPERCAPNIA: ICD-10-CM

## 2023-01-05 DIAGNOSIS — J12.3 HUMAN METAPNEUMOVIRUS PNEUMONIA: ICD-10-CM

## 2023-01-05 PROCEDURE — 1111F DSCHRG MED/CURRENT MED MERGE: CPT | Performed by: NURSE PRACTITIONER

## 2023-01-05 PROCEDURE — 99215 OFFICE O/P EST HI 40 MIN: CPT | Performed by: NURSE PRACTITIONER

## 2023-01-05 RX ORDER — BUDESONIDE 0.25 MG/2ML
0.25 INHALANT ORAL 2 TIMES DAILY
Qty: 120 ML | Refills: 2 | Status: SHIPPED | OUTPATIENT
Start: 2023-01-05 | End: 2023-02-04

## 2023-01-06 DIAGNOSIS — J44.9 CHRONIC OBSTRUCTIVE PULMONARY DISEASE, UNSPECIFIED COPD TYPE: Primary | ICD-10-CM

## 2023-01-11 ENCOUNTER — OFFICE VISIT (OUTPATIENT)
Dept: NEUROLOGY | Facility: CLINIC | Age: 58
End: 2023-01-11
Payer: COMMERCIAL

## 2023-01-11 VITALS
BODY MASS INDEX: 36.25 KG/M2 | DIASTOLIC BLOOD PRESSURE: 53 MMHG | HEIGHT: 62 IN | SYSTOLIC BLOOD PRESSURE: 107 MMHG | WEIGHT: 197 LBS | HEART RATE: 84 BPM

## 2023-01-11 DIAGNOSIS — G43.009 MIGRAINE WITHOUT AURA AND WITHOUT STATUS MIGRAINOSUS, NOT INTRACTABLE: Primary | ICD-10-CM

## 2023-01-11 PROCEDURE — 99205 OFFICE O/P NEW HI 60 MIN: CPT | Performed by: PSYCHIATRY & NEUROLOGY

## 2023-01-11 RX ORDER — ALPRAZOLAM 1 MG/1
0.5 TABLET ORAL 3 TIMES DAILY PRN
COMMUNITY
Start: 2022-12-15 | End: 2023-02-27 | Stop reason: SDUPTHER

## 2023-01-11 RX ORDER — DIVALPROEX SODIUM 125 MG/1
125 CAPSULE, COATED PELLETS ORAL 2 TIMES DAILY
COMMUNITY
Start: 2022-12-15

## 2023-01-11 NOTE — PROGRESS NOTES
Chief Complaint  Migraine    Subjective          Nova Manuel is a 57 y.o. female who presents to [facility]  History of Present Illness  57-year-old woman evaluated for chronic migraine headaches.  She states that she was seeing another neurologist in the past which she gives me vague answers on how long she has been taking Stadol, Fioricet.  She states that she has been seeing that practitioner for a long time.  She states that the only thing that helps her.  She took topiramate in the past, amitriptyline and she is presently on Depakote sprinkles.  She is taking 125 mg twice a day.  She states that she has at least 10-15 severe headaches a day.  It can last for 1 or more days.  She states that the pain medications did help her and that is the only thing that helped her at that time.  She is never taking any of the CR GP injectables or Botox injections.    She states that she started having migraine headaches when she was 17 to 18 years old.  He proceeded by aura at times of round spots, colors the last for 30 minutes to an hour and then goes away.  She states that these headaches are usually all over her head or can be left-sided or right-sided throbbing associated light and noise sensitivity, nausea.  She states it hurts for her to touch her hair.  The headaches can last for hours to days however she usually gets relieved by taking Fioricet which she takes up to 3 times a day when she has a severe headache.  She was prescribed Fioricet to take every few hours however she takes Fioricet 3 times a week.  She also took Stadol nasal spray and she took it twice a week before she was admitted in November.  She has not taken it since that time since she has not been prescribed.  She states that she does not take Tylenol regularly except when she has a fever.    She lives by herself.  Her aunt is with her today.      Objective   Vital Signs:   /53 (BP Location: Left  "arm, Patient Position: Sitting, Cuff Size: Adult)   Pulse 84   Ht 157.5 cm (62.01\")   Wt 89.4 kg (197 lb)   BMI 36.02 kg/m²     Physical Exam   She is alert, fluent, phasic, follows commands well.  Optic disc are normal bilaterally him several directions gaze, facial strength is full.  There is no weakness of the upper or lower extremities.  She is in a wheelchair.  Does not have her walker.  She uses a walker all the time at home.  Reflexes are absent in the biceps, triceps, patellar's and ankles.  Heart is regular rhythm normal in rate.        Assessment and Plan  Diagnoses and all orders for this visit:    1. Migraine without aura and without status migrainosus, not intractable (Primary)  Assessment & Plan:  I discussed with her that I would recommend for her to take one of the injectables and I discussed with her regarding Ajovy, Aimovig and Emgality.  I gave her literature on Ajovy and Emgality.  I discussed with her that this medications are given once a month and we have samples in our office.  I explained to her the adverse effects including injection site reaction and I reassured her that I have given these medication for over 5 years and have not encountered an anaphylactic reaction.  She states that she will think about it since she is allergic to so many medications.  I discussed with her that there are significant amount of adverse effects associated with the other medications that she is taking such as Depakote and it is not helpful.  I explained to her that she cannot take pain medications/abortive such as Fioricet, Stadol  several times a week since this causes medication overuse headache and renders preventive medication ineffective.  This has never been explained to her in the past.  I explained to her the goals of treating her with one of the injectables for 3 months to see if it lessens her headache frequency especially avoiding abortive treatment and if this is not effective for she can be " referred in the future for Botox injections.  She will think about it.  She is to call our office if she is willing to try one of the injectables and will give her sample and then will work on a prescription with her insurance.  She is to keep a headache calendar.  She can follow-up with her primary care provider in the future and discuss other options in regards her treatment.            Total time spent with the patient and coordinating patient care was 60 minutes.    Follow Up  No follow-ups on file.  Patient was given instructions and counseling regarding her condition or for health maintenance advice. Please see specific information pulled into the AVS if appropriate.

## 2023-01-11 NOTE — ASSESSMENT & PLAN NOTE
I discussed with her that I would recommend for her to take one of the injectables and I discussed with her regarding Ajovy, Aimovig and Emgality.  I gave her literature on Ajovy and Emgality.  I discussed with her that this medications are given once a month and we have samples in our office.  I explained to her the adverse effects including injection site reaction and I reassured her that I have given these medication for over 5 years and have not encountered an anaphylactic reaction.  She states that she will think about it since she is allergic to so many medications.  I discussed with her that there are significant amount of adverse effects associated with the other medications that she is taking such as Depakote and it is not helpful.  I explained to her that she cannot take pain medications/abortive such as Fioricet, Stadol  several times a week since this causes medication overuse headache and renders preventive medication ineffective.  This has never been explained to her in the past.  I explained to her the goals of treating her with one of the injectables for 3 months to see if it lessens her headache frequency especially avoiding abortive treatment and if this is not effective for she can be referred in the future for Botox injections.  She will think about it.  She is to call our office if she is willing to try one of the injectables and will give her sample and then will work on a prescription with her insurance.  She is to keep a headache calendar.  She can follow-up with her primary care provider in the future and discuss other options in regards her treatment.

## 2023-02-06 ENCOUNTER — TELEPHONE (OUTPATIENT)
Dept: PULMONOLOGY | Facility: CLINIC | Age: 58
End: 2023-02-06
Payer: COMMERCIAL

## 2023-02-06 NOTE — TELEPHONE ENCOUNTER
Patient called back this afternoon and stated that they picked up a nebulizer machine today from our office.  They stated that they were not sure how insurance would pay on this and might have to obtain this from [facility].  Stated that they might have to return the one obtained from our office to us as they have not opened it.  Can this please be discussed with patient?  There was a previous not yesterday that Germaine did help by having the machine ready for the patient's aunt Jo to  from our office.

## 2023-02-06 NOTE — TELEPHONE ENCOUNTER
Patient called in to say that the nebulizer they had was no longer working.  They had spoken to [facility] and they were asked to call our office in order to obtain a new prescription for this.  Can this be faxed to [facility] at [phone]? Thank you

## 2023-02-07 NOTE — TELEPHONE ENCOUNTER
I have called pt back 2 times.  The voice mail I get says José Miguel so I have not left a message.

## 2023-02-07 NOTE — TELEPHONE ENCOUNTER
Patient called again this morning about the machine and is wanting to return it because she states she cannot get supplies for it. Please call patient and discuss her concerns. Thank you.

## 2023-02-07 NOTE — TELEPHONE ENCOUNTER
If pt calls back please let her know that we can not take the machine back after it leaves our office.  Her insurance will cover it the same way it would cover the one from [employer] [facility].  Any time she needs supplies the office can order them for her

## 2023-02-20 DIAGNOSIS — J44.9 CHRONIC OBSTRUCTIVE PULMONARY DISEASE, UNSPECIFIED COPD TYPE: Primary | ICD-10-CM

## 2023-02-20 DIAGNOSIS — J43.2 CENTRILOBULAR EMPHYSEMA: ICD-10-CM

## 2023-02-27 ENCOUNTER — OFFICE VISIT (OUTPATIENT)
Dept: PULMONOLOGY | Facility: CLINIC | Age: 58
End: 2023-02-27
Payer: COMMERCIAL

## 2023-02-27 VITALS
SYSTOLIC BLOOD PRESSURE: 111 MMHG | HEIGHT: 63 IN | RESPIRATION RATE: 16 BRPM | OXYGEN SATURATION: 97 % | BODY MASS INDEX: 34.82 KG/M2 | HEART RATE: 69 BPM | TEMPERATURE: 98.2 F | WEIGHT: 196.5 LBS | DIASTOLIC BLOOD PRESSURE: 56 MMHG

## 2023-02-27 DIAGNOSIS — J96.11 CHRONIC RESPIRATORY FAILURE WITH HYPOXIA AND HYPERCAPNIA: ICD-10-CM

## 2023-02-27 DIAGNOSIS — J30.9 ALLERGIC RHINITIS, UNSPECIFIED SEASONALITY, UNSPECIFIED TRIGGER: Primary | ICD-10-CM

## 2023-02-27 DIAGNOSIS — E66.9 OBESITY (BMI 30-39.9): ICD-10-CM

## 2023-02-27 DIAGNOSIS — J43.2 CENTRILOBULAR EMPHYSEMA: ICD-10-CM

## 2023-02-27 DIAGNOSIS — J96.21 ACUTE ON CHRONIC RESPIRATORY FAILURE WITH HYPOXIA AND HYPERCAPNIA: ICD-10-CM

## 2023-02-27 DIAGNOSIS — J98.4 RESTRICTIVE LUNG DISEASE: ICD-10-CM

## 2023-02-27 DIAGNOSIS — J96.22 ACUTE ON CHRONIC RESPIRATORY FAILURE WITH HYPOXIA AND HYPERCAPNIA: ICD-10-CM

## 2023-02-27 DIAGNOSIS — J96.12 CHRONIC RESPIRATORY FAILURE WITH HYPOXIA AND HYPERCAPNIA: ICD-10-CM

## 2023-02-27 DIAGNOSIS — E66.01 MORBID OBESITY WITH BODY MASS INDEX OF 40.0-44.9 IN ADULT: ICD-10-CM

## 2023-02-27 PROCEDURE — 99214 OFFICE O/P EST MOD 30 MIN: CPT | Performed by: INTERNAL MEDICINE

## 2023-02-27 RX ORDER — PEN NEEDLE, DIABETIC 31 GX5/16"
NEEDLE, DISPOSABLE MISCELLANEOUS
COMMUNITY
Start: 2023-01-10

## 2023-02-27 RX ORDER — GABAPENTIN 300 MG/1
CAPSULE ORAL
COMMUNITY
Start: 2023-02-24

## 2023-02-27 RX ORDER — AZITHROMYCIN 250 MG/1
TABLET, FILM COATED ORAL
COMMUNITY
Start: 2023-02-24 | End: 2023-02-27

## 2023-02-27 RX ORDER — BLOOD-GLUCOSE METER
KIT MISCELLANEOUS
COMMUNITY
Start: 2023-01-08

## 2023-02-27 RX ORDER — ALBUTEROL SULFATE 90 UG/1
AEROSOL, METERED RESPIRATORY (INHALATION)
COMMUNITY
Start: 2023-01-16

## 2023-02-27 NOTE — PROGRESS NOTES
Primary Care Provider  Dana Banks APRN     Referring Provider  No ref. provider found     Chief Complaint  COPD, Emphysema, Follow-up (4-6 Week follow up), Shortness of Breath, Cough, and Wheezing    Subjective          Nova Manuel presents to [facility]  History of Present Illness  Nova Manuel is a 57 y.o. female patient with history of COPD, chronic respiratory failure on home oxygen, diabetes, anxiety, morbid obesity and thyroidectomy.  She has history of smoking in past, quit smoking in 2007.  Prior to that she used to smoke a pack a day for more than 20 years.  She continues to use trilogy machine with sleep.  She is on Pulmicort nebulizers twice daily and uses DuoNeb nebulizer 4 times a day.  She could not use Brovana.  She does have cough and wheezing at times.  She has shortness of breath with heavy activities.  She uses oxygen at 2 L/min with rest and activities.  She uses trilogy machine with oxygen bleed in at night.  She does have palliative care come to her home.  She was referred for pulmonary function test but has not done it yet.  She has [facility] primary care come evaluate her at home as well.  She is unwilling to go for lung transplant evaluation.    Review of Systems     General:  Fatigue, No Fever No weight loss or loss of appetite  HEENT: No dysphagia, No Visual Changes, no rhinorrhea  Respiratory:  + cough,+Dyspnea, intermittent mucoid phlegm, No Pleuritic Pain, no wheezing, no hemoptysis.  Cardiovascular: Denies chest pain, denies palpitations,+SCHROEDER, +Chest Pressure  Gastrointestinal:  No Abdominal Pain, No Nausea, No Vomiting, No Diarrhea  Genitourinary:  No Dysuria, No Frequency, No Hesitancy  Musculoskeletal: No muscle pain or swelling  Endocrine:  No Heat Intolerance, No Cold Intolerance  Hematologic:  No Bleeding, No Bruising  Psychiatric:  No Anxiety, No Depression  Neurologic:  No Confusion, no Dysarthria, No  Headaches  Skin:  No Rash, No Open Wounds    History reviewed. No pertinent family history.     Social History     Socioeconomic History   • Marital status: Single   Tobacco Use   • Smoking status: Former     Packs/day: 0.50     Years: 20.00     Pack years: 10.00     Types: Cigarettes     Quit date:      Years since quittin.1   • Smokeless tobacco: Never   Vaping Use   • Vaping Use: Never used   Substance and Sexual Activity   • Alcohol use: Defer   • Drug use: Defer   • Sexual activity: Defer        Past Medical History:   Diagnosis Date   • Anxiety    • Asthma    • CHF (congestive heart failure) ([facility])    • COPD (chronic obstructive pulmonary disease) ([facility])    • Diabetes mellitus ([facility])    • Disease of thyroid gland    • Hyperlipidemia    • Hypertension           There is no immunization history on file for this patient.      Allergies   Allergen Reactions   • Acetaminophen Unknown - High Severity   • Adhesive Tape Unknown - High Severity   • Baclofen Unknown - High Severity   • Budesonide-Formoterol Fumarate Unknown - High Severity   • Cephalexin Unknown - High Severity   • Cephalosporins Unknown - High Severity   • Codeine Unknown - High Severity   • Fluticasone-Salmeterol Unknown - High Severity   • Hydrocodone-Acetaminophen Unknown - High Severity   • Hydroxyzine Hcl Unknown - High Severity   • Iron Unknown - High Severity   • Oxycodone Unknown - High Severity   • Oxycodone-Acetaminophen Unknown - High Severity   • Penicillins Unknown - High Severity   • Prednisone Unknown - High Severity   • Sulfamethoxazole Unknown - High Severity   • Sulfamethoxazole-Trimethoprim Unknown - High Severity   • Tiotropium Bromide Monohydrate Unknown - Low Severity   • Trimethoprim Unknown - High Severity   • Vancomycin Unknown - High Severity   • Zafirlukast Unknown - High Severity          Current Outpatient Medications:   •  ALPRAZolam (XANAX) 0.5 MG tablet, Take 1 tablet by mouth 4 (Four) Times a Day As Needed., Disp:  , Rfl:   •  ALPRAZolam (XANAX) 1 MG tablet, Take 0.5 mg by mouth 3 (Three) Times a Day As Needed. for anxiety, Disp: , Rfl:   •  aspirin 81 MG chewable tablet, Chew 81 mg Daily., Disp: , Rfl:   •  atenolol (TENORMIN) 50 MG tablet, Take 50 mg by mouth 2 (Two) Times a Day., Disp: , Rfl:   •  B-D ULTRAFINE III SHORT PEN 31G X 8 MM misc, USE TO INJECT BASAGLAR EVERY DAY, Disp: , Rfl:   •  butalbital-acetaminophen-caffeine (FIORICET, ESGIC) -40 MG per tablet, Take 1 tablet by mouth Every 4 (Four) Hours As Needed for Headache., Disp: , Rfl:   •  butorphanol (STADOL) 10 MG/ML nasal spray, 1 spray into the nostril(s) as directed by provider 2 (Two) Times a Day As Needed (Alternating Nostrils; Administer in one nostril)., Disp: , Rfl:   •  Cholecalciferol (Vitamin D) 50 MCG (2000 UT) capsule, Take 2,000 Units by mouth Daily., Disp: , Rfl:   •  cyproheptadine (PERIACTIN) 4 MG tablet, Take 4 mg by mouth 2 (Two) Times a Day., Disp: , Rfl:   •  Divalproex Sodium (DEPAKOTE SPRINKLE) 125 MG capsule, 125 mg 2 (Two) Times a Day., Disp: , Rfl:   •  FREESTYLE LITE test strip, USE AS DIRECTED TO TEST BLOOD SUGAR TWICE DAILY, Disp: , Rfl:   •  furosemide (LASIX) 20 MG tablet, Take 20 mg by mouth Daily., Disp: , Rfl:   •  ipratropium-albuterol (DUO-NEB) 0.5-2.5 mg/3 ml nebulizer, Take 3 mL by nebulization Every 4 (Four) Hours., Disp: , Rfl:   •  Lantus SoloStar 100 UNIT/ML injection pen, Inject 13 Units under the skin into the appropriate area as directed Daily., Disp: , Rfl:   •  levothyroxine (SYNTHROID, LEVOTHROID) 125 MCG tablet, Take 125 mcg by mouth Daily., Disp: , Rfl:   •  lisinopril (PRINIVIL,ZESTRIL) 10 MG tablet, Take 10 mg by mouth 3 (Three) Times a Day As Needed (High Blood Pressure)., Disp: , Rfl:   •  montelukast (SINGULAIR) 10 MG tablet, Take 10 mg by mouth Daily., Disp: , Rfl:   •  multivitamin with minerals tablet tablet, Take 1 tablet by mouth Daily., Disp: , Rfl:   •  nitroglycerin (NITROSTAT) 0.4 MG SL tablet,  "Place 0.4 mg under the tongue Every 5 (Five) Minutes As Needed., Disp: , Rfl:   •  Omega-3 Fatty Acids (fish oil) 1000 MG capsule capsule, Take 1,000 mg by mouth 2 (Two) Times a Day., Disp: , Rfl:   •  Probiotic Product (Florajen Digestion) capsule, Take 1 capsule by mouth Daily., Disp: , Rfl:   •  sertraline (ZOLOFT) 100 MG tablet, Take 200 mg by mouth Daily., Disp: , Rfl:   •  spironolactone (ALDACTONE) 25 MG tablet, Take 25 mg by mouth 2 (Two) Times a Day., Disp: , Rfl:   •  Ventolin  (90 Base) MCG/ACT inhaler, INHALE 2 PUFFS BY MOUTH EVERY 2 HOURS AS NEEDED, Disp: , Rfl:   •  vitamin B-12 (CYANOCOBALAMIN) 1000 MCG tablet, Take 1,000 mcg by mouth Daily., Disp: , Rfl:   •  budesonide (Pulmicort) 0.25 MG/2ML nebulizer solution, Take 2 mL by nebulization 2 (Two) Times a Day for 30 days. Rinse mouth out after each use, Disp: 120 mL, Rfl: 2  •  divalproex (DEPAKOTE) 125 MG DR tablet, Take 375 mg by mouth 2 (Two) Times a Day., Disp: , Rfl:   •  gabapentin (NEURONTIN) 300 MG capsule, , Disp: , Rfl:      Objective   Vital Signs:   /56 (BP Location: Left arm, Patient Position: Sitting, Cuff Size: Adult)   Pulse 69   Temp 98.2 °F (36.8 °C) (Tympanic)   Resp 16   Ht 158.8 cm (62.5\")   Wt 89.1 kg (196 lb 8 oz)   SpO2 97% Comment: room air/ nasal cannula/ 2 liters continous  BMI 35.37 kg/m²     Mallampatti classification : 1  Physical Exam  Vital Signs Reviewed  WDWN, Alert, in mild distress, has conversational dyspnea, on nasal oxygen  HEENT:  PERRL, EOMI.  OP, nares clear, no sinus tenderness  Neck:  Supple, no JVD, no thyromegaly  Lymph: no axillary, cervical, supraclavicular lymphadenopathy noted bilaterally  Chest: Poor aeration, bilateral diminished breath sounds, no wheezing, crackles or rhonchi, resonant to percussion b/l  CV: RRR, no MGR, pulses 2+, equal  Abd:  Soft, NT, ND, + BS, no HSM  EXT:  no clubbing, no cyanosis, No BLE edema  Neuro:  A&Ox3, CN grossly intact, no focal deficits  Skin: No " rashes or lesions noted     Result Review :   The following data was reviewed by: Levi Infante MD on 02/27/2023:  Common labs    Common Labs 12/1/22 12/1/22 12/2/22 12/2/22 12/3/22 12/3/22    0440 0440 0518 0518 0454 0454   Glucose  100 (A)  126 (A)  125 (A)   BUN  26 (A)  24 (A)  23 (A)   Creatinine  0.62  0.71  0.72   Sodium  135 (A)  138  137   Potassium  4.6  4.5  4.4   Chloride  88 (A)  91 (A)  92 (A)   Calcium  10.2  11.0 (A)  10.2   WBC 9.60  8.36  7.63    Hemoglobin 12.4  12.7  12.6    Hematocrit 38.8  38.9  38.3    Platelets 179  167  188    (A) Abnormal value       Comments are available for some flowsheets but are not being displayed.           CMP    CMP 12/1/22 12/2/22 12/3/22   Glucose 100 (A) 126 (A) 125 (A)   BUN 26 (A) 24 (A) 23 (A)   Creatinine 0.62 0.71 0.72   eGFR 104.0 99.3 97.7   Sodium 135 (A) 138 137   Potassium 4.6 4.5 4.4   Chloride 88 (A) 91 (A) 92 (A)   Calcium 10.2 11.0 (A) 10.2   BUN/Creatinine Ratio 41.9 (A) 33.8 (A) 31.9 (A)   Anion Gap 4.0 (A) 1.7 (A) 3.8 (A)   (A) Abnormal value       Comments are available for some flowsheets but are not being displayed.           CBC    CBC 12/1/22 12/2/22 12/3/22   WBC 9.60 8.36 7.63   RBC 3.92 4.04 4.02   Hemoglobin 12.4 12.7 12.6   Hematocrit 38.8 38.9 38.3   MCV 99.0 (A) 96.3 95.3   MCH 31.6 31.4 31.3   MCHC 32.0 32.6 32.9   RDW 12.3 12.3 12.0 (A)   Platelets 179 167 188   (A) Abnormal value            CBC w/diff    CBC w/Diff 12/1/22 12/2/22 12/3/22   WBC 9.60 8.36 7.63   RBC 3.92 4.04 4.02   Hemoglobin 12.4 12.7 12.6   Hematocrit 38.8 38.9 38.3   MCV 99.0 (A) 96.3 95.3   MCH 31.6 31.4 31.3   MCHC 32.0 32.6 32.9   RDW 12.3 12.3 12.0 (A)   Platelets 179 167 188   (A) Abnormal value            Data reviewed: Radiologic studies CT scan of the chest from 11/23/2022 was reviewed.  Showed no pulm embolism, no acute infiltrate, no cardiac enlargement.  Atherosclerotic changes are noted.       Narrative & Impression   PROCEDURE:  CT CHEST W  CONTRAST DIAGNOSTIC     COMPARISONS:           11/23/2022; 7/8/2019; 8/11/2014.     INDICATIONS:  Shortness of breath; hypoxia; possible pulmonary embolism (PE).       TECHNIQUE:    After obtaining the patient's consent, 857 CT/CTA images were obtained with non-ionic   intravenous contrast material.       PROTOCOL:     Standard pulmonary arteriogram CTA imaging protocol performed.                 RADIATION:      Total DLP: 384 mGy*cm.               Automated exposure control was utilized to minimize radiation dose.   CONTRAST:      100 mL Isovue 370 I.V.  LABS:   eGFR: >60 mL/min/1.73m^2.     FINDINGS:          No pulmonary embolism is identified.  No acute infiltrate is seen.  There may be mild subsegmental   atelectasis and/or fibrosis in the lung bases.  Minimal, if any, emphysematous change of the lungs   is appreciated.  Atherosclerotic changes are seen, including involvement of the coronary arteries.    Probably no cardiac enlargement is seen.  The central tracheobronchial tree is well aerated without   filling defect.  No suspicious pulmonary nodules are identified.  No pleural or pericardial   effusion.  No gross evidence of thoracic aortic dissection or aneurysm.  The thoracic aorta is   diffusely atherosclerotic.  The great artery origins are also atherosclerotic.  The left vertebral   artery is thought to be dominant.  No enlarged mediastinal lymph nodes are appreciated.  Chronic   calcified granulomatous disease involves the chest and the abdomen.  There are postoperative   changes of the thyroid gland, especially on the right.  Please correlate clinically.  No acute   findings are seen in the partially imaged upper abdomen.  There is nonspecific nodularity of the   bilateral adrenal glands, seen on prior studies.  No acute fracture or aggressive osseous lesion.    Degenerative changes are seen throughout the imaged spine.  There is slight motion artifact on the   study.     IMPRESSION:                  No pulmonary embolism.  No acute infiltrate.  No cardiac enlargement.  Atherosclerotic changes are   noted, including involvement of the coronary arteries.             Please note that portions of this note were completed with a voice recognition program.     BOB NOVAK JR, MD         Electronically Signed and Approved By: BOB NOVAK JR, MD on 11/23/2022 at 21:50             Assessment and Plan    Diagnoses and all orders for this visit:    1. Allergic rhinitis, unspecified seasonality, unspecified trigger (Primary)    2. Morbid obesity with body mass index of 40.0-44.9 in adult (HCC)    3. Obesity (BMI 30-39.9)    4. Centrilobular emphysema (HCC)    5. Chronic respiratory failure with hypoxia and hypercapnia (HCC)    6. Acute on chronic respiratory failure with hypoxia and hypercapnia ([facility])    7. Restrictive lung disease      COPD: Very severe COPD, end-stage COPD: Continue with Pulmicort nebulizer twice daily.  Continue with DuoNeb 4 times a day.    Chronic hypoxic and hypercapnic respiratory failure: Continue with trilogy mechanical ventilator with sleep.  Continue with oxygen bleed in with trilogy machine.    Chronic hypoxic respiratory failure: Continue oxygen with rest and activities  Currently under care of palliative care for active home rehab.  Pulmonary function test is ordered, advised her to schedule visit as soon as possible  She will benefit from pulmonary rehab  I also discussed with her regarding lung transplant evaluation.  Patient is reluctant at this time.    Refuses vaccinations.     Follow Up   Return in about 3 months (around 5/27/2023).  Patient was given instructions and counseling regarding her condition or for health maintenance advice. Please see specific information pulled into the AVS if appropriate.       Electronically signed by Levi Infante MD, 2/27/2023, 17:16 EST.

## 2023-03-01 ENCOUNTER — TELEPHONE (OUTPATIENT)
Dept: NEUROLOGY | Facility: CLINIC | Age: 58
End: 2023-03-01

## 2023-03-01 NOTE — TELEPHONE ENCOUNTER
Caller: FABI     Best call back number: [phone]    What was the call regarding: PT WANTS TO CHANGE PROVIDER'S  FROM DR QUINN TO DR HOLT, PER HER REQUEST FROM REF . SHE SWITCHED FROM ANA TO DR CASTILLO.,  NOW  SAID SHE WANTED TO TALK TO LOUIS UGARTE TO CHANGE TO DR HOLT?      Do you require a callback: YES TO LET HER KNOW IF SHE CAN SWITCH PROVIDERS?      SHE SAID SHE CALLED IN DEC TALKED TO MIROSLAVA? AND WAS TOLD TO TALK  TO  OLIVERIO UGARTE.  MANAGER. SHOULD EXPECT A CALL    CALL ASAP     PLEASE ADVISE

## 2023-04-19 ENCOUNTER — OFFICE VISIT (OUTPATIENT)
Dept: NEUROLOGY | Facility: CLINIC | Age: 58
End: 2023-04-19
Payer: COMMERCIAL

## 2023-04-19 VITALS
OXYGEN SATURATION: 93 % | BODY MASS INDEX: 35.34 KG/M2 | DIASTOLIC BLOOD PRESSURE: 52 MMHG | SYSTOLIC BLOOD PRESSURE: 124 MMHG | HEART RATE: 65 BPM | HEIGHT: 63 IN

## 2023-04-19 DIAGNOSIS — G43.009 MIGRAINE WITHOUT AURA AND WITHOUT STATUS MIGRAINOSUS, NOT INTRACTABLE: Primary | ICD-10-CM

## 2023-04-19 DIAGNOSIS — M50.30 DEGENERATIVE DISC DISEASE, CERVICAL: ICD-10-CM

## 2023-04-19 RX ORDER — ALPRAZOLAM 1 MG/1
1 TABLET ORAL 3 TIMES DAILY PRN
COMMUNITY
Start: 2023-04-07

## 2023-04-19 NOTE — PROGRESS NOTES
This is a very pleasant 58-year-old female who I am seeing today in initial consultation for headaches.  She states that she is struggled with headaches for the last 10 to 15 years although the last year they have been more severe.  Unfortunately she had serious medical issues as well over the last year.        She states her typical headache starts with throbbing pain located around her left temple this will spread to where she will have throbbing pain throughout the head she often has nausea with these events light sensitivity and noise sensitivity.  Occasionally she will get visual auras before the headache but she states the auras are fairly rare when she does have a visual aura that she will often see bright sparkling lights or waving lines in her visual field this is followed by headache.        She is currently on Depakote 125 mg 2 tabs daily for migraine prophylaxis in the past she is tried multiple different antidepressants and topiramate she is also on Stadol and Fioricet for abortive therapy.  She states that she is having about 1-2 bad headaches a week.  Sometimes the headache will last for several days.        Past medical history    Migraines    COPD    Diabetes    Congestive heart failure        Family history    She has 2 sisters with migraines        Social history    No alcohol tobacco or illicit drug use        On examination today she is alert and oriented x3.  Cranial nerves II through XII are intact.  On direct ophthalmologically examination I do not get a good look at the optic disks.  She has 5 out of 5 strength in bilateral upper and lower extremities with normal tone and bulk.  There are no upper motor neuron signs no ataxia finger-nose or heel-to-shin sensations intact throughout there is no temporal tenderness no occipital tenderness.        In summary this very pleasant 58-year-old female with migraine headaches has been having worsening headaches over the last year.  Unfortunately she  is also had fairly significant medical decline as well as neck pain from her cervical stenosis.        We had a long discussion today regarding treatment options and I am going to start her on the Aimovig for prophylactic therapy she will continue the Depakote for now I would like her to stop all the narcotics and we will plan on using Nurtec for abortive therapy.  Given the worsening symptoms over the last year I am going to obtain an MRI of the brain and cervical spinal cord as well as some routine labs today.  She will follow-up with me in 6 to 8 weeks to make sure she is on the medication and tolerating it.  Of course if she has any problems in the interim I be happy to see her sooner.        I spent 1 hour in patient care.

## 2023-05-22 ENCOUNTER — TELEPHONE (OUTPATIENT)
Dept: NEUROLOGY | Facility: CLINIC | Age: 58
End: 2023-05-22
Payer: COMMERCIAL

## 2023-05-22 NOTE — TELEPHONE ENCOUNTER
Called pt to remind her she had labs pending. She is scheduled for MRI on 5/24- I have also made a note on appt note- pt has pending lab orders. Called to suggest she have labs drawn while she's there to have MRI. Pt went on to explain she may not be able to have MRI done this week and may need to reschedule. While on the phone pt mentioned that her PCP Dana usually has someone come to her house and draw her labs. Pt would prefer to have labs drawn this way.     Told pt I would speak with Dana and see how I would go about this.    Spoke with Dana, she stated they use [facility] or [facility]- they just send the order to them and they go out and draw the labs.     Will speak with Practice manager to see if this is something we can do.  -clr

## 2023-05-23 ENCOUNTER — SPECIALTY PHARMACY (OUTPATIENT)
Dept: ONCOLOGY | Facility: HOSPITAL | Age: 58
End: 2023-05-23
Payer: COMMERCIAL

## 2023-05-23 ENCOUNTER — DOCUMENTATION (OUTPATIENT)
Dept: ONCOLOGY | Facility: HOSPITAL | Age: 58
End: 2023-05-23
Payer: COMMERCIAL

## 2023-05-23 RX ORDER — RIMEGEPANT SULFATE 75 MG/75MG
75 TABLET, ORALLY DISINTEGRATING ORAL DAILY PRN
Qty: 16 TABLET | Refills: 11 | Status: SHIPPED | OUTPATIENT
Start: 2023-05-23 | End: 2023-05-26 | Stop reason: SDUPTHER

## 2023-05-23 RX ORDER — ERENUMAB-AOOE 140 MG/ML
140 INJECTION, SOLUTION SUBCUTANEOUS
Qty: 1 ML | Refills: 11 | Status: SHIPPED | OUTPATIENT
Start: 2023-05-23 | End: 2023-05-26 | Stop reason: SDUPTHER

## 2023-05-23 NOTE — PROGRESS NOTES
Specialty Pharmacy Refill Coordination Note     Nova is a 58 y.o. female contacted today regarding refills of  Aimovig and Nurtec specialty medication(s).    Reviewed and verified with patient:  Allergies  Meds  Problems       Specialty medication(s) and dose(s) confirmed: yes        Delivery Questions    Flowsheet Row Most Recent Value   Delivery method FedEx   Delivery address correct? Yes   Preferred delivery time? Anytime   Number of medications in delivery 2   Medication being filled and delivered Aimovig and Nurtec   Doses left of specialty medications New Starts   Is there any medication that is due not being filled? No   Supplies needed? No supplies needed   Cooler needed? Yes   Do any medications need mixed or dated? No   Copay form of payment Payment plan already set up   Questions or concerns for the pharmacist? No   Are any medications first time fills? Yes                 Follow-up: 28 day(s)     Heather Herring, Pharmacy Technician  Specialty Pharmacy Technician

## 2023-05-23 NOTE — PROGRESS NOTES
Specialty Pharmacy Patient Management Program  Neurology Initial Assessment     Nova Manuel is a 58 y.o. female with migraines seen by a Neurology provider and enrolled in the Neurology Patient Management program offered by [facility].  An initial outreach was conducted, including assessment of therapy appropriateness and specialty medication education for Aimovig and Nurtec. The patient was introduced to services offered by [facility], including: regular assessments, refill coordination, curbside pick-up or mail order delivery options, prior authorization maintenance, and financial assistance programs as applicable. The patient was also provided with contact information for the pharmacy team.     Insurance Coverage & Financial Support  [place] Medicaid    Relevant Past Medical History and Comorbidities  Relevant medical history and concomitant health conditions were discussed with the patient. The patient's chart has been reviewed for relevant past medical history and comorbid health conditions and updated as necessary.   Past Medical History:   Diagnosis Date   • Anxiety    • Asthma    • CHF (congestive heart failure)    • COPD (chronic obstructive pulmonary disease)    • Diabetes mellitus    • Disease of thyroid gland    • Hyperlipidemia    • Hypertension      Social History     Socioeconomic History   • Marital status: Single   Tobacco Use   • Smoking status: Former     Packs/day: 0.50     Years: 20.00     Pack years: 10.00     Types: Cigarettes     Quit date:      Years since quittin.4   • Smokeless tobacco: Never   Vaping Use   • Vaping Use: Never used   Substance and Sexual Activity   • Alcohol use: Not Currently   • Drug use: Never   • Sexual activity: Defer       Problem list reviewed by Jigna Gillespie, PharmD on 2023 at  2:05 PM    Allergies  Known allergies and reactions were discussed with the patient. The patient's chart has been  reviewed for  allergy information and updated as necessary.   Allergies   Allergen Reactions   • Acetaminophen Unknown - High Severity   • Adhesive Tape Unknown - High Severity   • Baclofen Unknown - High Severity   • Budesonide-Formoterol Fumarate Unknown - High Severity   • Cephalexin Unknown - High Severity   • Cephalosporins Unknown - High Severity   • Codeine Unknown - High Severity   • Fluticasone-Salmeterol Unknown - High Severity   • Hydrocodone-Acetaminophen Unknown - High Severity   • Hydroxyzine Hcl Unknown - High Severity   • Iron Unknown - High Severity   • Oxycodone Unknown - High Severity   • Oxycodone-Acetaminophen Unknown - High Severity   • Penicillins Unknown - High Severity   • Prednisone Unknown - High Severity   • Sulfamethoxazole Unknown - High Severity   • Sulfamethoxazole-Trimethoprim Unknown - High Severity   • Tiotropium Bromide Monohydrate Unknown - Low Severity   • Trimethoprim Unknown - High Severity   • Vancomycin Unknown - High Severity   • Zafirlukast Unknown - High Severity         Allergies reviewed by Jigna Gillespie, PharmD on 5/23/2023 at  2:03 PM    Current Medication List  This medication list has been reviewed with the patient and evaluated for any interactions or necessary modifications/recommendations, and updated to include all prescription medications, OTC medications, and supplements the patient is currently taking.  This list reflects what is contained in the patient's profile, which has also been marked as reviewed to communicate to other providers it is the most up to date version of the patient's current medication therapy.     Current Outpatient Medications:   •  ALPRAZolam (XANAX) 1 MG tablet, Take 1 tablet by mouth 3 (Three) Times a Day As Needed. for anxiety, Disp: , Rfl:   •  aspirin 81 MG chewable tablet, Chew 1 tablet Daily., Disp: , Rfl:   •  atenolol (TENORMIN) 50 MG tablet, Take 1 tablet by mouth 2 (Two) Times a Day., Disp: , Rfl:   •  B-D ULTRAFINE III  SHORT PEN 31G X 8 MM misc, USE TO INJECT BASAGLAR EVERY DAY, Disp: , Rfl:   •  budesonide (Pulmicort) 0.25 MG/2ML nebulizer solution, Take 2 mL by nebulization 2 (Two) Times a Day for 30 days. Rinse mouth out after each use, Disp: 120 mL, Rfl: 2  •  Cholecalciferol (Vitamin D) 50 MCG (2000 UT) capsule, Take 1 capsule by mouth Daily., Disp: , Rfl:   •  cyproheptadine (PERIACTIN) 4 MG tablet, Take 1 tablet by mouth 2 (Two) Times a Day., Disp: , Rfl:   •  Divalproex Sodium (DEPAKOTE SPRINKLE) 125 MG capsule, 1 capsule 2 (Two) Times a Day., Disp: , Rfl:   •  Erenumab-aooe (Aimovig) 140 MG/ML auto-injector, Inject 1 mL under the skin into the appropriate area as directed Every 30 (Thirty) Days., Disp: 1 mL, Rfl: 11  •  FREESTYLE LITE test strip, USE AS DIRECTED TO TEST BLOOD SUGAR TWICE DAILY, Disp: , Rfl:   •  furosemide (LASIX) 20 MG tablet, Take 1 tablet by mouth Daily., Disp: , Rfl:   •  gabapentin (NEURONTIN) 300 MG capsule, Take 1 capsule by mouth every night at bedtime., Disp: , Rfl:   •  ipratropium-albuterol (DUO-NEB) 0.5-2.5 mg/3 ml nebulizer, Take 3 mL by nebulization Every 4 (Four) Hours., Disp: , Rfl:   •  Lantus SoloStar 100 UNIT/ML injection pen, Inject 13 Units under the skin into the appropriate area as directed Daily., Disp: , Rfl:   •  levothyroxine (SYNTHROID, LEVOTHROID) 125 MCG tablet, Take 1 tablet by mouth Daily., Disp: , Rfl:   •  lisinopril (PRINIVIL,ZESTRIL) 10 MG tablet, Take 1 tablet by mouth 3 (Three) Times a Day As Needed (High Blood Pressure)., Disp: , Rfl:   •  montelukast (SINGULAIR) 10 MG tablet, Take 1 tablet by mouth Daily., Disp: , Rfl:   •  multivitamin with minerals tablet tablet, Take 1 tablet by mouth Daily., Disp: , Rfl:   •  nitroglycerin (NITROSTAT) 0.4 MG SL tablet, Place 1 tablet under the tongue Every 5 (Five) Minutes As Needed., Disp: , Rfl:   •  O2 (OXYGEN), Inhale 3 L/min Daily., Disp: , Rfl:   •  Omega-3 Fatty Acids (fish oil) 1000 MG capsule capsule, Take 1 capsule by  mouth 2 (Two) Times a Day., Disp: , Rfl:   •  Probiotic Product (Florajen Digestion) capsule, Take 1 capsule by mouth Daily., Disp: , Rfl:   •  Rimegepant Sulfate (Nurtec) 75 MG tablet dispersible tablet, Take 1 tablet by mouth Daily As Needed (migraines). Take at onset of headache (Max of 75 mg/24 hours), Disp: 16 tablet, Rfl: 11  •  sertraline (ZOLOFT) 100 MG tablet, Take 2 tablets by mouth Daily., Disp: , Rfl:   •  spironolactone (ALDACTONE) 25 MG tablet, Take 1 tablet by mouth 2 (Two) Times a Day., Disp: , Rfl:   •  Ventolin  (90 Base) MCG/ACT inhaler, INHALE 2 PUFFS BY MOUTH EVERY 2 HOURS AS NEEDED, Disp: , Rfl:   •  vitamin B-12 (CYANOCOBALAMIN) 1000 MCG tablet, Take 1 tablet by mouth Daily., Disp: , Rfl:     Medicines reviewed by Jigna Gillespie, PharmD on 5/23/2023 at  2:03 PM  Medicines reviewed by Jigna Gillespie, PharmD on 5/23/2023 at  2:10 PM    Drug Interactions  none     Recommended Medications Assessment    None      Relevant Laboratory Values    Common labs        12/1/2022    04:40 12/2/2022    05:18 12/3/2022    04:54   Common Labs   Glucose 100   126   125     BUN 26   24   23     Creatinine 0.62   0.71   0.72     Sodium 135   138   137     Potassium 4.6   4.5   4.4     Chloride 88   91   92     Calcium 10.2   11.0   10.2     WBC 9.60   8.36   7.63     Hemoglobin 12.4   12.7   12.6     Hematocrit 38.8   38.9   38.3     Platelets 179   167   188         Initial Education Provided for Specialty Medication  The patient has been provided with the following education and any applicable administration techniques (i.e. self-injection) have been demonstrated for the therapies indicated. All questions and concerns have been addressed prior to the patient receiving the medication, and the patient has verbalized understanding of the education and any materials provided.  Additional patient education shall be provided and documented upon request by the patient, provider or payer.                 Aimovig (Erenumab-aoee)           Medication Expectations   Why am I taking this medication? You are taking this medication for migraine prophylaxis.   What should I expect while on this medication? You should expect to a decrease in the frequency and severity of your migraines.   How does the medication work? Aimovig is a monoclonal antibody that binds to calcitonin gene-related peptide (CGRP) and blocks its binding to the receptor decreasing the severity of migraines.   How long will I be on this medication for? The amount of time you will be on this medication will be determined by your doctor and your response to the medication.    How do I take this medication? Take as directed on your prescription label. This medication is a self-injection given every 28 days.    What are some possible side effects? Injection site reactions and hypersensitivity reactions, constipation, new or worsening hypertension.   What happens if I miss a dose? If you miss a dose, take it as soon as you remember, and time next dose 28 days from last dose.                  Medication Safety   What are things I should warn my doctor immediately about? Hypersensitivity reactions, development or worsening of hypertension that may occur anytime during treatment. Tell you doctor if you develop new or worsening hypertension    What are things that I should be cautious of? Injection site reaction, constipation, new or worsening hypertension.   What are some medications that can interact with this one? No drug interactions identified.            Medication Storage/Handling   How should I handle this medication? Keep this medication our of reach of pets/children in original container.  On the day your Aimovig is due let it set at room temperature for 30 minutes prior to injection. (do NOT warm using a heat source such as hot water or a microwave).  Administer in the abdomen, thigh, back of the upper arm, or buttocks.  Do not inject where the skin  is tender, bruised, red or hard.  Rotate injection sites.   How does this medication need to be stored? Store in refrigerator and keep dry.   How should I dispose of this medication? You can dispose of the empty syringe in a sharps container, and if this is not available you may use an empty hard plastic container such as a milk jug or tide container.            Resources/Support   How can I remind myself to take this medication? You can download a reminder shellie on your phone or use a calandar  to help with your monthly injection.   Is financial support available?  Yes, [employer] and [employer] can provide co-pay cards if you have commercial insurance or patient assistance if you have Medicare or no insurance.    Which vaccines are recommended for me? Talk to your doctor about these vaccines: Flu, Coronavirus (COVID-19), Pneumococcal (pneumonia), Tdap, Hepatitis B, Zoster (shingles)             Nurtec (rimegepant)  Medication Expectations   Why am I taking this medication? You are taking this medication for migraine prophylaxis or to treat an acute migraine.   What should I expect while on this medication? You should expect to see a decrease in the frequency and severity of your migraines.   How does the medication work? Nurtec is a monoclonal antibody that binds to calcitonin gene-related peptide (CGRP) and blocks its binding to the receptor decreasing the severity of migraines.   How long will I be on this medication for? The amount of time you will be on this medication will be determined by your doctor and your response to the medication.    How do I take this medication? Take as directed on your prescription label.   What are some possible side effects? Potential side effects including, but not limited to nausea. Pt verbalized understanding.   What happens if I miss a dose? Take the missed dose as soon as possible, and resume the every other day timed from the last dose..     Medication Safety   What are things I  should warn my doctor immediately about? Hypersensitivity reactions - trouble breathing or swallowing.   What are things that I should be cautious of? Hypersensitivity reactions (eg, dyspnea, rash), including delayed serious reactions, have occurred; discontinue use if suspected    What are some medications that can interact with this one? Avoid concomitant administration of Nurtec ODT with strong inhibitors of CY, strong or moderate inducers of CYP3A or inhibitors of P-gp or BCRP. Avoid another dose of Nurtec ODT within 48 hours when it is administered with moderate inhibitors of CY.  Ask your pharmacist or health care provider before starting new medications     Medication Storage/Handling   How should I handle this medication? Keep this medication out of reach of pets/children in original container. Ensure hands are dry before opening blister pack.   How does this medication need to be stored? Store at room temperature away from heat/cold, sunlight or moisture   How should I dispose of this medication? There should not be a need to dispose of this medication unless your provider decides to change the dose or therapy. If that is the case, take to your local police station for proper disposal. Some pharmacies also have take-back bins for medication drop-off.      Resources/Support   How can I remind myself to take this medication? You can download reminder apps to help you manage your refills. You may also set an alarm on your phone to remind you. The pharmacy carries pill boxes that you can place next to an area you pass everyday (such as where you place your car keys or where you charge your phone)   Is financial support available?  Yes, [employer] can provide co-pay cards if you have commercial insurance or patient assistance if you have Medicare or no insurance.    Which vaccines are recommended for me? Talk to your doctor about these vaccines: Flu, Coronavirus (COVID-19), Pneumococcal  (pneumonia), Tdap, Hepatitis B, Zoster (shingles)           Adherence and Self-Administration  • Barriers to Patient Adherence and/or Self-Administration: none    • Methods for Supporting Patient Adherence and/or Self-Administration: Aimovig self-injection education during this visit     Goals of Therapy   Goals     • Specialty Pharmacy General Goal      Decrease frequency, severity and duration of migraine attacks by 25%               Reassessment Plan & Follow-Up  1. Medication Therapy Changes: Start Aimovig 140mg SubQ every month and Nurtec 75 mg po once daily as needed for migraines - Take at onset of headache (Max of 75 mg/24 hours)  2. Additional Plans, Therapy Recommendations, or Therapy Problems to Be Addressed: none  3. Pharmacist to perform regular reassessments no more than (6) months from the previous assessment.  4. Welcome information and patient satisfaction survey to be sent by retail team with patient's initial fill.  5. Care Coordinator to set up future refill outreaches, coordinate prescription delivery, and escalate clinical questions to pharmacist.     Attestation  I attest that the initiated specialty medication(s) are appropriate for the patient based on my assessment.  If the prescribed therapy is at any point deemed not appropriate based on the current or future assessments, a consultation will be initiated with the patient's specialty care provider to determine the best course of action. The revised plan of therapy will be documented along with any additional patient education provided.     Jigna Gillespie, PharmD  5/23/2023  14:21 EDT

## 2023-05-26 ENCOUNTER — SPECIALTY PHARMACY (OUTPATIENT)
Dept: ONCOLOGY | Facility: HOSPITAL | Age: 58
End: 2023-05-26
Payer: COMMERCIAL

## 2023-05-26 RX ORDER — RIMEGEPANT SULFATE 75 MG/75MG
75 TABLET, ORALLY DISINTEGRATING ORAL DAILY PRN
Qty: 16 TABLET | Refills: 11 | Status: SHIPPED | OUTPATIENT
Start: 2023-05-26

## 2023-05-26 RX ORDER — ERENUMAB-AOOE 140 MG/ML
140 INJECTION, SOLUTION SUBCUTANEOUS
Qty: 1 ML | Refills: 11 | Status: SHIPPED | OUTPATIENT
Start: 2023-05-26

## 2023-06-22 ENCOUNTER — TELEPHONE (OUTPATIENT)
Dept: PULMONOLOGY | Facility: CLINIC | Age: 58
End: 2023-06-22

## 2023-06-22 NOTE — TELEPHONE ENCOUNTER
Caller: FABI     Relationship to patient: Self -     Best call back number: [phone]    Patient is needing: DENIAL SENT ON 6- IN REGARDS TO HOME VENTILATOR. PATIENT IS UPSET AND [employer] INSURANCE IS CALLING IN REGARDS TO AN APPEAL. PLEASE CALL BACK AT:    [phone]     OR     [phone]

## 2023-06-23 NOTE — TELEPHONE ENCOUNTER
I called and spoke with with insurance company.  They are saying pt is 70% compliant with using her vent and that is not compliant enough for them to cont. To pay.  I have reached out to PT aids to get her compliance report.  I will type up an appeal stating that pt is using and still needing her machine.

## 2023-07-10 ENCOUNTER — TELEPHONE (OUTPATIENT)
Dept: NEUROLOGY | Facility: CLINIC | Age: 58
End: 2023-07-10

## 2023-07-10 NOTE — TELEPHONE ENCOUNTER
Provider: DR HOLT  Caller: PATIENT  Relationship to Patient: SELF  Phone Number: [phone]  Reason for Call: PATIENT'S MRI AUTH IS ONLY GOOD UNTIL 8/1/23. PATIENT'S MRI'S ARE SCHEDULED FOR 8/11/23. PLEASE ADVISE IF WE CAN GET AN EXTENSION FOR PATIENT'S AUTH. THANK YOU.

## 2023-07-11 NOTE — TELEPHONE ENCOUNTER
LEFT VM FOR PATIENT. [employer] WILL OBTAIN EXTENSION ON AUTH AND IF THEY ARE UNABLE TO EXTEND AUTH THEY WILL LET THE PATIENT KNOW.

## 2023-07-30 NOTE — PATIENT INSTRUCTIONS
Chronic Obstructive Pulmonary Disease Exacerbation    Chronic obstructive pulmonary disease (COPD) is a long-term (chronic) condition that affects the lungs. COPD is a general term that can be used to describe many different lung problems that cause lung inflammation and limit airflow, including chronic bronchitis and emphysema. COPD exacerbations are episodes when breathing symptoms flare up, become much worse, and require extra treatment.  COPD exacerbations are usually caused by infections. Without treatment, COPD exacerbations can be severe and even life threatening. Frequent COPD exacerbations can cause further damage to the lungs.  What are the causes?  This condition may be caused by:  Respiratory infections, including viral and bacterial infections.  Exposure to smoke.  Exposure to air pollution, chemical fumes, or dust.  Things that can cause an allergic reaction (allergens).  Not taking your usual COPD medicines as directed.  Underlying medical problems, such as congestive heart failure or infections not involving the lungs.  In many cases, the cause of this condition is not known.  What increases the risk?  The following factors may make you more likely to develop this condition:  Smoking cigarettes.  Being an older adult.  Having frequent prior COPD exacerbations.  What are the signs or symptoms?  Symptoms of this condition include:  Increased coughing.  Increased production of mucus from your lungs.  Increased wheezing and shortness of breath.  Rapid or labored breathing.  Chest tightness.  Less energy than usual.  Sleep disruption from symptoms.  Confusion  Increased sleepiness.  Often, these symptoms happen or get worse even with the use of medicines.  How is this diagnosed?  This condition is diagnosed based on:  Your medical history.  A physical exam.  You may also have tests, including:  A chest X-ray.  Blood tests.  Lung (pulmonary) function tests.  How is this treated?  Treatment for this  condition depends on the severity and cause of the symptoms. You may need to be admitted to a hospital for treatment. Some of the treatments commonly used to treat COPD exacerbations are:  Antibiotic medicines. These may be used for severe exacerbations caused by a lung infection, such as pneumonia.  Bronchodilators. These are inhaled medicines that expand the air passages and allow increased airflow. They may make your breathing more comfortable.  Steroid medicines. These act to reduce inflammation in the airways. They may be given with an inhaler, taken by mouth, or given through an IV tube inserted into one of your veins.  Supplemental oxygen therapy.  Airway clearing techniques, such as noninvasive ventilation (NIV) and positive expiratory pressure (PEP). These provide respiratory support through a mask or other noninvasive device. An example of this would be using a continuous positive airway pressure (CPAP) machine to improve delivery of oxygen into your lungs.  Follow these instructions at home:  Medicines  Take over-the-counter and prescription medicines only as told by your health care provider.  It is important to use correct technique with inhaled medicines.  If you were prescribed an antibiotic medicine or oral steroid, take it as told by your health care provider. Do not stop taking the medicine even if you start to feel better.  Lifestyle  Do not use any products that contain nicotine or tobacco. These products include cigarettes, chewing tobacco, and vaping devices, such as e-cigarettes. If you need help quitting, ask your health care provider.  Eat a healthy diet.  Exercise regularly.  Get enough sleep. Most adults need 7 or more hours per night.  Avoid exposure to all substances that irritate the airway, especially tobacco smoke.  Regularly wash your hands with soap and water for at least 20 seconds. If soap and water are not available, use hand . This may help prevent you from getting  infections.  During flu season, avoid enclosed spaces that are crowded with people.  General instructions  Drink enough fluid to keep your urine pale yellow, unless you have a medical condition that requires fluid restriction.  Use a cool mist vaporizer. This humidifies the air and makes it easier for you to clear your chest when you cough.  If you have a home nebulizer and oxygen, continue to use them as told by your health care provider.  Keep all follow-up visits. This is important.  How is this prevented?  Stay up-to-date on pneumococcal and flu (influenza) vaccines. A flu shot is recommended every year to help prevent exacerbations.  Quitting smoking is very important in preventing COPD from getting worse and in preventing exacerbations from happening as often.  Follow all instructions for pulmonary rehabilitation after a recent exacerbation. This can help prevent future exacerbations.  Work with your health care provider to develop and follow an action plan. This tells you what steps to take when you experience certain symptoms.  Contact a health care provider if:  You have a worsening of your regular COPD symptoms.  Get help right away if:  You have worsening shortness of breath, even when resting.  You have trouble talking.  You have severe chest pain.  You cough up blood.  You have a fever.  You have weakness, vomit repeatedly, or faint.  You feel confused.  You are not able to sleep because of your symptoms.  You have trouble doing daily activities.  These symptoms may represent a serious problem that is an emergency. Do not wait to see if the symptoms will go away. Get medical help right away. Call your local emergency services (911 in the [place] Do not drive yourself to the hospital.  Summary  COPD exacerbations are episodes when breathing symptoms become much worse and require extra treatment above your normal treatment.  Exacerbations can be severe and even life threatening. Frequent COPD exacerbations  can cause further damage to your lungs.  COPD exacerbations are usually triggered by infections such as the flu, colds, and even pneumonia.  Treatment for this condition depends on the severity and cause of the symptoms. You may need to be admitted to a hospital for treatment.  Quitting smoking is very important to prevent COPD from getting worse and to prevent exacerbations from happening as often.  This information is not intended to replace advice given to you by your health care provider. Make sure you discuss any questions you have with your health care provider.  Document Revised: 10/26/2021 Document Reviewed: 10/26/2021  Elsevier Patient Education c 2023 [employer]

## 2023-07-30 NOTE — PROGRESS NOTES
Primary Care Provider  Lizbeth Sims APRN     Referring Provider  No ref. provider found     Chief Complaint  Follow-up (3 month follow up), COPD, Asthma, and Sleep Apnea ([facility]- pt aids)    Subjective          History of Presenting Illness  Patient is a 58-year-old female, patient Dr. Infante to presents for management of very severe COPD and chronic hypoxic and hypercapnic respiratory failure who presents for a follow-up visit today.  Patient states that since last visit her breathing is at baseline.  Patient states that she does get short of breath that is worse with exertion, moderate severity, and improved with rest.  Patient states that she is taking Pulmicort every day prescribed and uses albuterol inhaler and DuoNeb nebulizer treatments as needed.  Patient is not able to use Brovana due to an allergy.  Patient is on oxygen at 2.5 L/min via nasal cannula continuously and receives her oxygen through patient aids.  Patient states that she is currently using NIPPV machine with oxygen bled in and receives her supplies through patient aids as well.  Patient states that she is benefiting from NIPPV machine which helps her to sleep.  Patient does have palliative care coming to her home.  Patient had been referred for pulmonary function testing, however patient continues to decline to have testing completed at this time.  Patient states that she has several doctors appointments and tests scheduled at this time and would prefer to wait.  Patient continues to decline referral for lung transplant evaluation. Patient denies fever, chills, night sweats, swollen glands in the head and neck, unintentional weight loss, hemoptysis, purulent sputum production, dysphagia, chest pain, palpitations, chest tightness, abdominal pain, nausea, vomiting, and diarrhea.  Patient also denies any myalgias, changes in sense of taste and/or smell, sore throat, any other coronavirus or flu-like symptoms.  Patient denies any leg swelling,  orthopnea, paroxysmal nocturnal dyspnea.  Patient is able to perform activities of daily living.        Review of Systems   Constitutional:  Negative for activity change, appetite change, chills, diaphoresis, fatigue, fever, unexpected weight gain and unexpected weight loss.        Negative for Insomnia   HENT:  Negative for congestion (Nasal), mouth sores, nosebleeds, postnasal drip, sore throat, swollen glands and trouble swallowing.         Negative for Thrush  Negative for Hoarseness  Negative for Allergies/Hay Fever  Negative for Recent Head injury  Negative for Ear Fullness  Negative for Nasal or Sinus pain  Negative for Dry lips  Negative for Nasal discharge   Respiratory:  Positive for cough (intermittent) and shortness of breath. Negative for apnea, chest tightness and wheezing.         Negative for Hemoptysis  Negative for Pleuritic pain   Cardiovascular:  Negative for chest pain, palpitations and leg swelling.        Negative for Claudication  Negative for Cyanosis  Negative for Dyspnea on exertion   Gastrointestinal:  Negative for abdominal pain, diarrhea, nausea, vomiting and GERD.   Musculoskeletal:  Negative for joint swelling and myalgias.        Negative for Joint pain  Negative for Joint stiffness   Skin:  Negative for color change, dry skin, pallor and rash.   Neurological:  Negative for syncope, weakness and headache.   Hematological:  Negative for adenopathy. Does not bruise/bleed easily.      Family History   Problem Relation Age of Onset    Dementia Mother     Migraines Sister     Stroke Maternal Grandmother         Social History     Socioeconomic History    Marital status: Single   Tobacco Use    Smoking status: Former     Packs/day: 0.50     Years: 20.00     Pack years: 10.00     Types: Cigarettes     Start date:      Quit date:      Years since quittin.6     Passive exposure: Past    Smokeless tobacco: Never    Tobacco comments:     Patient does not know the start date.     Vaping Use    Vaping Use: Never used   Substance and Sexual Activity    Alcohol use: Not Currently    Drug use: Never    Sexual activity: Defer        Past Medical History:   Diagnosis Date    Anxiety     Asthma     CHF (congestive heart failure)     COPD (chronic obstructive pulmonary disease)     Diabetes mellitus     Disease of thyroid gland     Hyperlipidemia     Hypertension           There is no immunization history on file for this patient.    Allergies   Allergen Reactions    Acetaminophen Unknown - High Severity    Adhesive Tape Unknown - High Severity    Baclofen Unknown - High Severity    Budesonide-Formoterol Fumarate Unknown - High Severity    Cephalexin Unknown - High Severity    Cephalosporins Unknown - High Severity    Codeine Unknown - High Severity    Fluticasone-Salmeterol Unknown - High Severity    Hydrocodone-Acetaminophen Unknown - High Severity    Hydroxyzine Hcl Unknown - High Severity    Iron Unknown - High Severity    Oxycodone Unknown - High Severity    Oxycodone-Acetaminophen Unknown - High Severity    Penicillins Unknown - High Severity    Prednisone Unknown - High Severity    Sulfamethoxazole Unknown - High Severity    Sulfamethoxazole-Trimethoprim Unknown - High Severity    Tiotropium Bromide Monohydrate Unknown - Low Severity    Trimethoprim Unknown - High Severity    Vancomycin Unknown - High Severity    Zafirlukast Unknown - High Severity    Nurtec [Rimegepant Sulfate] Palpitations     Chest pain    Ubrelvy [Ubrogepant] Palpitations     Patient also had chest pain.           Current Outpatient Medications:     ALPRAZolam (XANAX) 1 MG tablet, Take 1 tablet by mouth 3 (Three) Times a Day As Needed. for anxiety, Disp: , Rfl:     aspirin 81 MG chewable tablet, Chew 1 tablet Daily., Disp: , Rfl:     atenolol (TENORMIN) 50 MG tablet, Take 1 tablet by mouth 2 (Two) Times a Day., Disp: , Rfl:     budesonide (Pulmicort) 0.25 MG/2ML nebulizer solution, Take 2 mL by nebulization 2 (Two)  Times a Day for 30 days. Rinse mouth out after each use, Disp: 120 mL, Rfl: 11    Cholecalciferol (Vitamin D) 50 MCG (2000 UT) capsule, Take 1 capsule by mouth Daily., Disp: , Rfl:     cyproheptadine (PERIACTIN) 4 MG tablet, Take 1 tablet by mouth 2 (Two) Times a Day., Disp: , Rfl:     Divalproex Sodium (DEPAKOTE SPRINKLE) 125 MG capsule, 1 capsule 2 (Two) Times a Day., Disp: , Rfl:     ipratropium-albuterol (DUO-NEB) 0.5-2.5 mg/3 ml nebulizer, Take 3 mL by nebulization Every 6 (Six) Hours As Needed for Wheezing or Shortness of Air for up to 30 days., Disp: 120 mL, Rfl: 11    levoFLOXacin (LEVAQUIN) 750 MG tablet, Take 1 tablet by mouth Daily., Disp: , Rfl:     levothyroxine (SYNTHROID, LEVOTHROID) 125 MCG tablet, Take 1 tablet by mouth Daily., Disp: , Rfl:     lisinopril (PRINIVIL,ZESTRIL) 10 MG tablet, Take 1 tablet by mouth 3 (Three) Times a Day As Needed (High Blood Pressure)., Disp: , Rfl:     montelukast (SINGULAIR) 10 MG tablet, Take 1 tablet by mouth Daily. As needed., Disp: , Rfl:     multivitamin with minerals tablet tablet, Take 1 tablet by mouth Daily., Disp: , Rfl:     nitroglycerin (NITROSTAT) 0.4 MG SL tablet, Place 1 tablet under the tongue Every 5 (Five) Minutes As Needed., Disp: , Rfl:     O2 (OXYGEN), Inhale 3 L/min Daily., Disp: , Rfl:     Omega-3 Fatty Acids (fish oil) 1000 MG capsule capsule, Take 1 capsule by mouth 2 (Two) Times a Day., Disp: , Rfl:     ondansetron (ZOFRAN) 8 MG tablet, Take 1 tablet by mouth Every 12 (Twelve) Hours., Disp: , Rfl:     Probiotic Product (Florajen Digestion) capsule, Take 1 capsule by mouth Daily., Disp: , Rfl:     sertraline (ZOLOFT) 100 MG tablet, Take 2 tablets by mouth Daily., Disp: , Rfl:     spironolactone (ALDACTONE) 25 MG tablet, Take 1 tablet by mouth 2 (Two) Times a Day., Disp: , Rfl:     Ventolin  (90 Base) MCG/ACT inhaler, INHALE 2 PUFFS BY MOUTH EVERY 2 HOURS AS NEEDED, Disp: , Rfl:     B-D ULTRAFINE III SHORT PEN 31G X 8 MM misc, USE TO  "INJECT BASAGLAR EVERY DAY, Disp: , Rfl:     Erenumab-aooe (Aimovig) 140 MG/ML auto-injector, Inject 1 mL under the skin into the appropriate area as directed Every 30 (Thirty) Days. (Patient not taking: Reported on 8/7/2023), Disp: 1 mL, Rfl: 11    FREESTYLE LITE test strip, USE AS DIRECTED TO TEST BLOOD SUGAR TWICE DAILY, Disp: , Rfl:     furosemide (LASIX) 20 MG tablet, Take 1 tablet by mouth Daily. (Patient not taking: Reported on 8/7/2023), Disp: , Rfl:     gabapentin (NEURONTIN) 300 MG capsule, Take 1 capsule by mouth every night at bedtime. (Patient not taking: Reported on 8/7/2023), Disp: , Rfl:     Lantus SoloStar 100 UNIT/ML injection pen, Inject 13 Units under the skin into the appropriate area as directed Daily., Disp: , Rfl:     Rimegepant Sulfate (Nurtec) 75 MG tablet dispersible tablet, Take 1 tablet by mouth Daily As Needed (migraines). Take at onset of headache (Max of 75 mg/24 hours) (Patient not taking: Reported on 8/7/2023), Disp: 16 tablet, Rfl: 11    vitamin B-12 (CYANOCOBALAMIN) 1000 MCG tablet, Take 1 tablet by mouth Daily. (Patient not taking: Reported on 8/7/2023), Disp: , Rfl:      Objective     Physical Exam  Vital Signs:   WDWN, Alert, NAD.    HEENT:  PERRL, EOMI.  OP, nares clear, no sinus tenderness  Neck:  Supple, no JVD, no thyromegaly.  Lymph: no axillary, cervical, supraclavicular lymphadenopathy noted bilaterally  Chest:   Diminished breath sounds bilaterally. No wheezes, rales, or rhonchi appreciated.  Normal work of breathing noted.  Patient is able speak full sentences without difficulty.    CV: RRR, no MGR, pulses 2+, equal.  Abd:  Soft, NT, ND, + BS, no HSM  EXT:  no clubbing, no cyanosis, no edema, no joint tenderness  Neuro:  A&Ox3, CN grossly intact, no focal deficits.  Skin: No rashes or lesions noted.    /50 (BP Location: Left arm, Patient Position: Sitting, Cuff Size: Large Adult)   Pulse 79   Resp 16   Ht 158.8 cm (62.52\")   Wt 91.4 kg (201 lb 9.6 oz)   SpO2 " 95% Comment: 2.5 liters of oxygen  BMI 36.26 kg/mý         Result Review :   I have reviewed Dr. Infante's last office visit note.    Procedures:           Assessment and Plan      Assessment:  1. Very severe COPD, FEV1 of 13%.  2. Restrictive lung disease.  3. Centrilobular emphysema.  4. Chronic hypoxic and hypercapnic respiratory failure: home trilogy and supplemental oxygen.   5.  Tobacco abuse of cigarettes in remission.        Plan:  1. Patient had been referred for pulmonary function testing, however patient continues to decline to have testing completed at this time.  Patient states that she has several doctors appointments and tests scheduled at this time and would prefer to wait.  2.  Continue Pulmicort prescribed and rinse mouth out after each use.  3.  Continue albuterol inhaler and DuoNeb nebulizer treatments as needed.  4.  Patient is benefiting from NIPPV machine and needs to continue NIPPV machine continue NIPPV machine with oxygen bled in at current settings at night and with naps and clean mask and tubing daily.  5.  Continue oxygen to keep SPO2 at 89% and above.  6.  Patient continues to decline referral for lung transplant evaluation.  7.  Vaccination status: patient reports they are up-to-date with flu, pneumonia, and Covid vaccines.  Patient is advised to continue to follow CDC recommendations such as social distancing wearing a mask and washing hands for at least 20 seconds.  8.  Smoking status: patient is a former cigarette smoker.  9.  Patient to call the office, 911, or go to the ER with new or worsening symptoms.  10.  Follow-up in November 2023, sooner if needed.              Follow Up   Return for in November 2023 with Dr. Infante.  Patient was given instructions and counseling regarding her condition or for health maintenance advice. Please see specific information pulled into the AVS if appropriate.

## 2023-08-02 RX ORDER — ONDANSETRON HYDROCHLORIDE 8 MG/1
1 TABLET, FILM COATED ORAL EVERY 12 HOURS SCHEDULED
COMMUNITY
Start: 2023-05-30

## 2023-08-07 ENCOUNTER — TELEPHONE (OUTPATIENT)
Dept: PULMONOLOGY | Facility: CLINIC | Age: 58
End: 2023-08-07

## 2023-08-07 ENCOUNTER — OFFICE VISIT (OUTPATIENT)
Dept: PULMONOLOGY | Facility: CLINIC | Age: 58
End: 2023-08-07
Payer: COMMERCIAL

## 2023-08-07 VITALS
DIASTOLIC BLOOD PRESSURE: 50 MMHG | BODY MASS INDEX: 35.72 KG/M2 | WEIGHT: 201.6 LBS | HEART RATE: 79 BPM | RESPIRATION RATE: 16 BRPM | HEIGHT: 63 IN | OXYGEN SATURATION: 95 % | SYSTOLIC BLOOD PRESSURE: 110 MMHG

## 2023-08-07 DIAGNOSIS — J96.11 CHRONIC RESPIRATORY FAILURE WITH HYPOXIA AND HYPERCAPNIA: ICD-10-CM

## 2023-08-07 DIAGNOSIS — J96.12 CHRONIC RESPIRATORY FAILURE WITH HYPOXIA AND HYPERCAPNIA: ICD-10-CM

## 2023-08-07 DIAGNOSIS — J44.9 CHRONIC OBSTRUCTIVE PULMONARY DISEASE, UNSPECIFIED COPD TYPE: ICD-10-CM

## 2023-08-07 DIAGNOSIS — J43.2 CENTRILOBULAR EMPHYSEMA: ICD-10-CM

## 2023-08-07 DIAGNOSIS — J98.4 RESTRICTIVE LUNG DISEASE: ICD-10-CM

## 2023-08-07 DIAGNOSIS — F17.201 TOBACCO ABUSE, IN REMISSION: Primary | ICD-10-CM

## 2023-08-07 RX ORDER — IPRATROPIUM BROMIDE AND ALBUTEROL SULFATE 2.5; .5 MG/3ML; MG/3ML
3 SOLUTION RESPIRATORY (INHALATION) EVERY 6 HOURS PRN
Qty: 120 ML | Refills: 11 | Status: SHIPPED | OUTPATIENT
Start: 2023-08-07 | End: 2023-09-06

## 2023-08-07 RX ORDER — BUDESONIDE 0.25 MG/2ML
0.25 INHALANT ORAL 2 TIMES DAILY
Qty: 120 ML | Refills: 11 | Status: SHIPPED | OUTPATIENT
Start: 2023-08-07 | End: 2023-09-06

## 2023-08-07 RX ORDER — LEVOFLOXACIN 750 MG/1
1 TABLET ORAL DAILY
COMMUNITY
Start: 2023-08-04

## 2023-08-11 ENCOUNTER — HOSPITAL ENCOUNTER (OUTPATIENT)
Dept: MRI IMAGING | Facility: HOSPITAL | Age: 58
Discharge: HOME | End: 2023-08-11
Payer: COMMERCIAL

## 2023-08-11 DIAGNOSIS — M50.30 DEGENERATIVE DISC DISEASE, CERVICAL: ICD-10-CM

## 2023-08-11 DIAGNOSIS — G43.009 MIGRAINE WITHOUT AURA AND WITHOUT STATUS MIGRAINOSUS, NOT INTRACTABLE: ICD-10-CM

## 2023-08-11 PROCEDURE — 70551 MRI BRAIN STEM W/O DYE: CPT

## 2023-08-11 PROCEDURE — 72141 MRI NECK SPINE W/O DYE: CPT

## 2023-09-07 NOTE — PLAN OF CARE
Goal Outcome Evaluation:              Outcome Evaluation: AOx4, pleasant patient. Medicated for c/o pain and anxiety per mar. Up to chair and bsc with standy assistance. Continues on 4L NC, desats to the 80s but recovers within 1-2 minutes back to 90s. Blood glucose monitored. Awaiting rehab placement.   PAST SURGICAL HISTORY:  No significant past surgical history

## 2023-09-15 ENCOUNTER — TELEPHONE (OUTPATIENT)
Dept: PULMONOLOGY | Facility: CLINIC | Age: 58
End: 2023-09-15
Payer: COMMERCIAL

## 2023-09-15 NOTE — TELEPHONE ENCOUNTER
Caller: Nova Manuel    Relationship: Self    Best call back number: [phone]    What form or medical record are you requesting: LETTER OF MEDICAL NECESSITY     Who is requesting this form or medical record from you: [employer]     How would you like to receive the form or medical records (pick-up, mail, fax): MAIL   If mail, what is the address: [facility] [place] [address]      Timeframe paperwork needed: ASAP    Additional notes: PT NEEDS A LETTER STATING THAT SHE CANNOT MAKE IT TO MAILBOX AND WILL NEED MAIL DELIVERED TO DOOR. PLEASE CALL PT AND ADVISE IF THIS WOULD BE POSSIBLE AND MAIL TO [facility] IF SO.

## 2023-09-18 NOTE — TELEPHONE ENCOUNTER
Spoke to patient she is aware that Urmila is out of the office till wednesday 9-. Will call patient back with a update on Wednesday.

## 2023-09-19 ENCOUNTER — TELEPHONE (OUTPATIENT)
Dept: NEUROLOGY | Facility: CLINIC | Age: 58
End: 2023-09-19

## 2023-09-19 ENCOUNTER — LAB (OUTPATIENT)
Dept: LAB | Facility: HOSPITAL | Age: 58
End: 2023-09-19
Payer: COMMERCIAL

## 2023-09-19 ENCOUNTER — OFFICE VISIT (OUTPATIENT)
Dept: NEUROLOGY | Facility: CLINIC | Age: 58
End: 2023-09-19
Payer: COMMERCIAL

## 2023-09-19 VITALS
BODY MASS INDEX: 36.26 KG/M2 | DIASTOLIC BLOOD PRESSURE: 63 MMHG | HEART RATE: 67 BPM | OXYGEN SATURATION: 93 % | HEIGHT: 63 IN | SYSTOLIC BLOOD PRESSURE: 111 MMHG

## 2023-09-19 DIAGNOSIS — G37.9 DEMYELINATING DISEASE: ICD-10-CM

## 2023-09-19 DIAGNOSIS — G43.011 INTRACTABLE MIGRAINE WITHOUT AURA AND WITH STATUS MIGRAINOSUS: ICD-10-CM

## 2023-09-19 DIAGNOSIS — G43.009 MIGRAINE WITHOUT AURA AND WITHOUT STATUS MIGRAINOSUS, NOT INTRACTABLE: ICD-10-CM

## 2023-09-19 DIAGNOSIS — G37.9 DEMYELINATING DISEASE: Primary | ICD-10-CM

## 2023-09-19 LAB
CRP SERPL-MCNC: 0.33 MG/DL (ref 0–0.5)
ERYTHROCYTE [SEDIMENTATION RATE] IN BLOOD: 17 MM/HR (ref 0–30)

## 2023-09-19 PROCEDURE — 86053 AQAPRN-4 ANTB FLO CYTMTRY EA: CPT

## 2023-09-19 PROCEDURE — 86235 NUCLEAR ANTIGEN ANTIBODY: CPT

## 2023-09-19 PROCEDURE — 85652 RBC SED RATE AUTOMATED: CPT

## 2023-09-19 PROCEDURE — 86140 C-REACTIVE PROTEIN: CPT

## 2023-09-19 PROCEDURE — 36415 COLL VENOUS BLD VENIPUNCTURE: CPT

## 2023-09-19 PROCEDURE — 86038 ANTINUCLEAR ANTIBODIES: CPT

## 2023-09-19 PROCEDURE — 86362 MOG-IGG1 ANTB CBA EACH: CPT

## 2023-09-19 PROCEDURE — 86225 DNA ANTIBODY NATIVE: CPT

## 2023-09-19 PROCEDURE — 99214 OFFICE O/P EST MOD 30 MIN: CPT | Performed by: PSYCHIATRY & NEUROLOGY

## 2023-09-19 RX ORDER — AZELASTINE 1 MG/ML
1 SPRAY, METERED NASAL DAILY PRN
COMMUNITY
Start: 2023-08-30

## 2023-09-19 NOTE — PROGRESS NOTES
After Visit Summary   2017    Felice Blood    MRN: [id number]           Patient Information     Date Of Birth          1940        Visit Information        Provider Department      2017 8:15 AM Camron Hansen MD [facility]        Today's Diagnoses     Pseudophakia of both eyes - Both Eyes    -  1    ERM OD (epiretinal membrane, right eye)        Pseudophakia           Follow-ups after your visit        Follow-up notes from your care team     Return in about 4 weeks (around 6/15/2017) for As schedule for postop.      Your next 10 appointments already scheduled     May 25, 2017  7:45 AM CDT   Post-Op with Camron Hansen MD   Eye Clinic ([facility])    [facility]  [address]  [address]  [facility] [id number] [phone]              Who to contact     Please call your clinic at [phone] to:    Ask questions about your health    Make or cancel appointments    Discuss your medicines    Learn about your test results    Speak to your doctor   If you have compliments or concerns about an experience at your clinic, or if you wish to file a complaint, please contact [facility] Patient Relations at [phone] or email us at [email or web address]         Additional Information About Your Visit        MyChart Information     [employer] is an electronic gateway that provides easy, online access to your medical records. With [employer], you can request a clinic appointment, read your test results, renew a prescription or communicate with your care team.     To sign up for [employer] visit the website at [email or web address]   You will be asked to enter the access code listed below, as well as some personal information. Please follow the directions to create your username and password.     Your access code is: [email or web address]  Expires: 2017  8:30 AM     Your access code will  in 90 days. If you  Pleasant 58-year-old female who had seen initial consultation for headaches.  I had started her on Aimovig and Ubrelvy.  She cannot take triptans given her underlying congestive heart failure.  She never started the Aimovig she was concerned about starting a new medication and she states she did take the Ubrelvy which did help with the headache but states it made her feel funny and she did not want to take any more of the medication I would switch this to Nurtec and she states Nurtec also made her feel funny although was somewhat effective for the headache.  She is on Depakote for prophylactic therapy now.        She is here today to review testing.        She reports no new neurologic issues since I saw her last.  She denies ever having focal neurologic deficits as a young woman or symptoms concerning for possible demyelinating disease    I reviewed the MRI of the brain which shows nephric and white matter disease for 58-year-old.  There is no sagittal FLAIR but a couple of these lesions do look to be periventricular and a couple of them look concerning for demyelinating disease.  There is also some atrophy of the brain.  Although some of these lesions could be ischemic gliotic change couple of them do look more concerning for demyelinating disease.  There may be also a lesion in it within the fourth ventricle although this may be artifact and not completely sure of this the radiologist does not feel like there is major abnormalities within the ventricular system.  The MRI of the cervical cord showed multilevel degenerative disc disease without cord lesions.        Her MRI of the brain is somewhat concerning for demyelinating disease although a lot of these lesions can be explained from long-term hypertension diabetes COPD and heart failure some of them do look more concerning for demyelinating process.  I am going to obtain an MRI of the brain with sagittal FLAIR and some additional antibody testing.  We again  discussed starting different medications but she would prefer just to stay on the Depakote.  She will follow-up with ophthalmology for dilated funduscopic exam.  She will follow-up with me at the conclusion of testing or sooner if there is any problems in the interim.        I spent 40 minutes in patient care.   need help or a new code, please contact your [facility] or call [phone] for assistance.        Care EveryWhere ID     This is your Care EveryWhere ID. This could be used by other organizations to access your [facility] medical records  [id number]         Blood Pressure from Last 3 Encounters:   05/01/17 137/82   04/14/17 133/88   04/12/17 121/74    Weight from Last 3 Encounters:   05/01/17 72.6 kg (160 lb)   04/14/17 72 kg (158 lb 12.8 oz)   04/12/17 74.7 kg (164 lb 11.2 oz)              Today, you had the following     No orders found for display       Primary Care Provider Office Phone # Fax #    Anthony Maddox [phone]       [facility] [address] [place] [id number]        Thank you!     Thank you for choosing [facility]  for your care. Our goal is always to provide you with excellent care. Hearing back from our patients is one way we can continue to improve our services. Please take a few minutes to complete the written survey that you may receive in the mail after your visit with us. Thank you!             Your Updated Medication List - Protect others around you: Learn how to safely use, store and throw away your medicines at www.disposemymeds.org.          This list is accurate as of: 5/18/17  8:49 AM.  Always use your most recent med list.                   Brand Name Dispense Instructions for use    aspirin 81 MG tablet      Take 1 tablet by mouth daily.       atorvastatin 10 MG tablet    LIPITOR    90 tablet    Take 1 tablet (10 mg) by mouth daily       cholecalciferol 1000 UNITS capsule    vitamin  -D     Take 1 capsule by mouth daily.       lisinopril 10 MG tablet    PRINIVIL/ZESTRIL    90 tablet    Take 1 tablet (10 mg) by mouth daily       ofloxacin 0.3 % ophthalmic solution    OCUFLOX    1 Bottle    Apply 1 drop to eye 3 times daily       prednisoLONE acetate 1 % ophthalmic susp    PRED FORTE    1 Bottle    Start OD 1  drop four times a day for 1 week, then three times a day for 1 week, then twice a day for 1 week, then once a day for 1 week       sildenafil 100 MG cap/tab    VIAGRA    10 tablet    Take  by mouth daily as needed for erectile dysfunction. One half to one tab po qd prn       sildenafil 20 MG tablet    REVATIO/VIAGRA    50 tablet    Take 5 tablets (100 mg) by mouth daily

## 2023-09-19 NOTE — TELEPHONE ENCOUNTER
Caller: Nova Manuel    Relationship: Self    Best call back number: [phone]    What was the call regarding: PATIENT IS CALLING BECAUSE SHE GOT A CALL FROM [facility] AND DOESN'T KNOW WHY THEY WOULD BE CALLING HER. SHE DOESN'T KNOW WHY DR. HOLT IS REFERRING HER TO THEM AND WOULD LIKE MORE INFO PLEASE.    Is it okay if the provider responds through MyChart: NO

## 2023-09-21 ENCOUNTER — TELEPHONE (OUTPATIENT)
Dept: NEUROLOGY | Facility: CLINIC | Age: 58
End: 2023-09-21
Payer: COMMERCIAL

## 2023-09-21 NOTE — TELEPHONE ENCOUNTER
Patient calls the office this morning concerned about getting her scheduling performed that Dr. Barbosa ordered for her and seeing Dr. Barbosa for a follow up afterward - before Dr. Barbosa leaves practice on 10/13/2023.  She stated she was not aware of Dr. Barbosa leaving until calling the office.  She is unsure who will follow her after he leaves    Per patient Dr. Barbosa said at her last visit to follow up in TWO WEEKS to go over results, however, she cannot get her testing performed - MRI brain and carotid duplex - before Dr. Barbosa leaves UNLESS it is marked as STAT per patient.  She states the first opening is 10/23/2023 per scheduling department at [facility].      Patient hasn't called to get ophthalmologist appointment scheduled at this time either.      Patient is calling asking what she should do regarding scheduling this testing and her follow up?  .      Patient is asking for return call regarding this issue.      Please advise what next steps need to be for patient.   wnt

## 2023-09-22 LAB
ANA HOMOGEN TITR SER: ABNORMAL {TITER}
ANA SER QL IF: POSITIVE
CENTROMERE B AB SER-ACNC: <0.2 AI (ref 0–0.9)
CHROMATIN AB SERPL-ACNC: 0.2 AI (ref 0–0.9)
DSDNA AB SER-ACNC: <1 IU/ML (ref 0–9)
ENA JO1 AB SER-ACNC: <0.2 AI (ref 0–0.9)
ENA RNP AB SER-ACNC: <0.2 AI (ref 0–0.9)
ENA SCL70 AB SER-ACNC: <0.2 AI (ref 0–0.9)
ENA SM AB SER-ACNC: <0.2 AI (ref 0–0.9)
ENA SS-A AB SER-ACNC: <0.2 AI (ref 0–0.9)
ENA SS-B AB SER-ACNC: <0.2 AI (ref 0–0.9)
LABORATORY COMMENT REPORT: ABNORMAL
Lab: ABNORMAL
Lab: ABNORMAL
MOG AB SER QL CBA IFA: NEGATIVE

## 2023-09-22 NOTE — TELEPHONE ENCOUNTER
Spoke with patient and scheduled follow up; she is going to try and have testing performed before her follow up is scheduled.  wnt

## 2023-09-26 ENCOUNTER — TELEPHONE (OUTPATIENT)
Dept: PULMONOLOGY | Facility: CLINIC | Age: 58
End: 2023-09-26

## 2023-09-26 NOTE — TELEPHONE ENCOUNTER
I spoke Luzma and she advised if it has to do with her DME equipment, Jeanette can fill this paperwork out for patient. I called pt and pt stated she will bring paper work to office for Jeanette to fill out. Patient stated Jeanette can fax it in to the office. Pt stated fax number is on paper work.

## 2023-09-26 NOTE — TELEPHONE ENCOUNTER
Caller: Nova Manuel    Relationship: Self    Best call back number: [phone]    What form or medical record are you requesting: [facility] [employer] PAPERWORK    Who is requesting this form or medical record from you: PATIENT    How would you like to receive the form or medical records (pick-up, mail, fax):      If pick-up, provide patient with address and location details    Timeframe paperwork needed: ASAP    Additional notes: PATIENT WOULD LIKE TO KNOW IF SHE COULD DROP THE PAPERWORK OFF AT THE OFFICE TO BE FILLED OUT FOR HER SECTION 8 RECERT ABOUT HER EQUIPMENT SHE HAS. PLEASE CALL PATIENT AND LET HER KNOW A GOOD TIME TO DROP THEM OFF.

## 2023-09-29 ENCOUNTER — HOSPITAL ENCOUNTER (OUTPATIENT)
Dept: CARDIOLOGY | Facility: HOSPITAL | Age: 58
Discharge: HOME | End: 2023-09-29
Payer: COMMERCIAL

## 2023-09-29 DIAGNOSIS — G37.9 DEMYELINATING DISEASE: ICD-10-CM

## 2023-09-29 LAB
BH CV XLRA MEAS LEFT CAROTID BULB EDV: 16 CM/SEC
BH CV XLRA MEAS LEFT CAROTID BULB PSV: 77 CM/SEC
BH CV XLRA MEAS LEFT DIST CCA EDV: 20.3 CM/SEC
BH CV XLRA MEAS LEFT DIST CCA PSV: 119.1 CM/SEC
BH CV XLRA MEAS LEFT DIST ICA EDV: -13.3 CM/SEC
BH CV XLRA MEAS LEFT DIST ICA PSV: -79.2 CM/SEC
BH CV XLRA MEAS LEFT ICA/CCA RATIO: 0.75
BH CV XLRA MEAS LEFT MID ICA EDV: -24.3 CM/SEC
BH CV XLRA MEAS LEFT MID ICA PSV: -80.7 CM/SEC
BH CV XLRA MEAS LEFT PROX CCA EDV: 24.2 CM/SEC
BH CV XLRA MEAS LEFT PROX CCA PSV: 140.4 CM/SEC
BH CV XLRA MEAS LEFT PROX ECA EDV: -14.1 CM/SEC
BH CV XLRA MEAS LEFT PROX ECA PSV: -112.1 CM/SEC
BH CV XLRA MEAS LEFT PROX ICA EDV: -22.7 CM/SEC
BH CV XLRA MEAS LEFT PROX ICA PSV: -89.4 CM/SEC
BH CV XLRA MEAS LEFT VERTEBRAL A EDV: 10.2 CM/SEC
BH CV XLRA MEAS LEFT VERTEBRAL A PSV: 47 CM/SEC
BH CV XLRA MEAS RIGHT CAROTID BULB EDV: 23 CM/SEC
BH CV XLRA MEAS RIGHT CAROTID BULB PSV: 88 CM/SEC
BH CV XLRA MEAS RIGHT DIST CCA EDV: 18.6 CM/SEC
BH CV XLRA MEAS RIGHT DIST CCA PSV: 87.6 CM/SEC
BH CV XLRA MEAS RIGHT DIST ICA EDV: -21.3 CM/SEC
BH CV XLRA MEAS RIGHT DIST ICA PSV: -81.7 CM/SEC
BH CV XLRA MEAS RIGHT ICA/CCA RATIO: 0.94
BH CV XLRA MEAS RIGHT MID ICA EDV: -27.3 CM/SEC
BH CV XLRA MEAS RIGHT MID ICA PSV: -95.1 CM/SEC
BH CV XLRA MEAS RIGHT PROX CCA EDV: 16.8 CM/SEC
BH CV XLRA MEAS RIGHT PROX CCA PSV: 96.3 CM/SEC
BH CV XLRA MEAS RIGHT PROX ECA EDV: -9.1 CM/SEC
BH CV XLRA MEAS RIGHT PROX ECA PSV: -95.3 CM/SEC
BH CV XLRA MEAS RIGHT PROX ICA EDV: -23 CM/SEC
BH CV XLRA MEAS RIGHT PROX ICA PSV: -83.2 CM/SEC
BH CV XLRA MEAS RIGHT VERTEBRAL A EDV: 10.5 CM/SEC
BH CV XLRA MEAS RIGHT VERTEBRAL A PSV: 32.9 CM/SEC
LEFT ARM BP: NORMAL MMHG
RIGHT ARM BP: NORMAL MMHG

## 2023-09-29 PROCEDURE — 93880 EXTRACRANIAL BILAT STUDY: CPT

## 2023-10-04 LAB — AQP4 H2O CHANNEL IGG SERPL QL: NEGATIVE

## 2023-10-12 ENCOUNTER — HOSPITAL ENCOUNTER (OUTPATIENT)
Dept: MRI IMAGING | Facility: HOSPITAL | Age: 58
Discharge: HOME | End: 2023-10-12
Admitting: PSYCHIATRY & NEUROLOGY
Payer: COMMERCIAL

## 2023-10-12 DIAGNOSIS — G37.9 DEMYELINATING DISEASE: ICD-10-CM

## 2023-10-12 PROCEDURE — 70551 MRI BRAIN STEM W/O DYE: CPT

## 2023-10-13 ENCOUNTER — OFFICE VISIT (OUTPATIENT)
Dept: NEUROLOGY | Facility: CLINIC | Age: 58
End: 2023-10-13
Payer: COMMERCIAL

## 2023-10-13 VITALS
SYSTOLIC BLOOD PRESSURE: 93 MMHG | HEART RATE: 66 BPM | HEIGHT: 63 IN | BODY MASS INDEX: 36.26 KG/M2 | DIASTOLIC BLOOD PRESSURE: 36 MMHG | OXYGEN SATURATION: 95 %

## 2023-10-13 DIAGNOSIS — G43.011 INTRACTABLE MIGRAINE WITHOUT AURA AND WITH STATUS MIGRAINOSUS: Primary | ICD-10-CM

## 2023-10-13 PROCEDURE — 99214 OFFICE O/P EST MOD 30 MIN: CPT | Performed by: PSYCHIATRY & NEUROLOGY

## 2023-10-13 RX ORDER — DIVALPROEX SODIUM 500 MG/1
500 TABLET, DELAYED RELEASE ORAL 2 TIMES DAILY
Qty: 60 TABLET | Refills: 2 | Status: SHIPPED | OUTPATIENT
Start: 2023-10-13

## 2023-10-13 NOTE — PROGRESS NOTES
This is a very pleasant 58-year-old female who had seen in initial consultation for migraine headaches and for an abnormal MRI of the brain.  She is here today to review testing.  She reports no new neurologic issues since I saw her last.        She does continue to struggle with migraine headaches.  Her typical migraine starts with throbbing pain located around her eyes with light sensitivity noise sensitivity and occasional nausea she is having 1-2 these headaches a week.  She is currently on Depakote 300 mg p.o. twice daily for migraine prophylaxis I had given her Nurtec for abortive therapy but she had trouble tolerating this she also had trouble tolerating Ubrelvy.  I had set her up to start her Mobic but she never started this medication.  She was concerned about possible side effects.      I reviewed her repeat MRI of the brain with sagittal FLAIR.  These lesions on the sagittal FLAIR look more like ischemic gliotic change.  I do not see any obvious periventricular lesions there are no juxtacortical lesions.  The overall appearance is more consistent with long-term ischemic gliotic change she does have some global atrophy I reviewed her carotid duplex which shows no major vascular lesion.        In summary this a very pleasant 58-year-old female.  I do not think she has multiple sclerosis or another underlying neuro immune condition.  I think the changes in her brain are likely from longstanding hypertension hyperlipidemia diabetes and COPD.        We had a long discussion today regarding treatment options for migraine she is hesitant to try additional abortive therapy she could not do triptans due to her cardiovascular disease.  I did offer her referral to headache specialist but she did not want to consider that at this time I am going to increase the Depakote to 500 mg p.o. twice daily.  Hopefully this will decrease the headache frequency.  I also discussed some of the newer medications but she just wants  to stick with the Depakote right now.        I explained to them that we will be leaving [facility] later this year.  I am going to refer her to one of the other [facility] neurologist.  Of course if she has any problems in the interim I be happy to see her again.

## 2023-12-08 RX ORDER — IPRATROPIUM BROMIDE AND ALBUTEROL SULFATE 2.5; .5 MG/3ML; MG/3ML
SOLUTION RESPIRATORY (INHALATION)
Refills: 11 | OUTPATIENT
Start: 2023-12-08

## 2023-12-08 RX ORDER — BUDESONIDE 0.5 MG/2ML
INHALANT ORAL
Qty: 60 EACH | Refills: 11 | OUTPATIENT
Start: 2023-12-08

## 2023-12-14 ENCOUNTER — TELEPHONE (OUTPATIENT)
Dept: PULMONOLOGY | Facility: CLINIC | Age: 58
End: 2023-12-14

## 2023-12-14 NOTE — TELEPHONE ENCOUNTER
Caller: Nova Manuel    Relationship to patient: Self    Best call back number: [phone] (home)       Patient is needing: PT IS REQUESTING A NOTE BE SENT TO [facility] ABOUT THE SPRAY THEY ARE USING TO SPRAY FOR PEST IN HER COMPLEX. IT BOTHERS HER BREATHING AND SHE NEEDS DOCUMENTATION SO THEY WILL NOT SPRAY HER UNIT.   FAX:[phone] FELIPE EUCEDA

## 2023-12-14 NOTE — TELEPHONE ENCOUNTER
I have printed and faxed the letter to number provided. Left patient a detailed message in regard to this.

## 2024-01-31 ENCOUNTER — OFFICE VISIT (OUTPATIENT)
Dept: PULMONOLOGY | Facility: CLINIC | Age: 59
End: 2024-01-31
Payer: COMMERCIAL

## 2024-01-31 VITALS
HEART RATE: 81 BPM | TEMPERATURE: 97.1 F | OXYGEN SATURATION: 90 % | SYSTOLIC BLOOD PRESSURE: 118 MMHG | RESPIRATION RATE: 18 BRPM | HEIGHT: 63 IN | BODY MASS INDEX: 38.62 KG/M2 | WEIGHT: 218 LBS | DIASTOLIC BLOOD PRESSURE: 31 MMHG

## 2024-01-31 DIAGNOSIS — J96.12 CHRONIC RESPIRATORY FAILURE WITH HYPOXIA AND HYPERCAPNIA: ICD-10-CM

## 2024-01-31 DIAGNOSIS — J44.9 CHRONIC OBSTRUCTIVE PULMONARY DISEASE, UNSPECIFIED COPD TYPE: Primary | ICD-10-CM

## 2024-01-31 DIAGNOSIS — J43.2 CENTRILOBULAR EMPHYSEMA: ICD-10-CM

## 2024-01-31 DIAGNOSIS — J96.11 CHRONIC RESPIRATORY FAILURE WITH HYPOXIA AND HYPERCAPNIA: ICD-10-CM

## 2024-01-31 DIAGNOSIS — F17.201 TOBACCO ABUSE, IN REMISSION: ICD-10-CM

## 2024-01-31 RX ORDER — IPRATROPIUM BROMIDE AND ALBUTEROL SULFATE 2.5; .5 MG/3ML; MG/3ML
3 SOLUTION RESPIRATORY (INHALATION) EVERY 6 HOURS PRN
Qty: 120 ML | Refills: 11 | Status: SHIPPED | OUTPATIENT
Start: 2024-01-31 | End: 2024-03-01

## 2024-01-31 RX ORDER — BUDESONIDE 0.25 MG/2ML
0.25 INHALANT ORAL 2 TIMES DAILY
Qty: 120 ML | Refills: 11 | Status: SHIPPED | OUTPATIENT
Start: 2024-01-31 | End: 2024-03-01

## 2024-01-31 RX ORDER — ALBUTEROL SULFATE 90 UG/1
2 AEROSOL, METERED RESPIRATORY (INHALATION) EVERY 4 HOURS PRN
Qty: 18 G | Refills: 11 | Status: SHIPPED | OUTPATIENT
Start: 2024-01-31 | End: 2024-03-01

## 2024-01-31 NOTE — PROGRESS NOTES
Primary Care Provider  Provider, No Known     Referring Provider  No ref. provider found     Chief Complaint  COPD, Cough, Shortness of Breath, Wheezing, Follow-up, and Asthma    Subjective          History of Presenting Illness  Patient is a 58-year-old female, patient Dr. Infante to presents for management of very severe COPD and chronic hypoxic and hypercapnic respiratory failure who presents for a follow-up visit today.  Patient's aunt Jo is present with the patient in the office today.  Patient states that since last visit her breathing is at baseline.  Patient states that she does get short of breath that is worse with exertion, moderate in severity, and improved with rest.  Patient states that she is taking Pulmicort every day prescribed and uses albuterol inhaler and DuoNeb nebulizer treatments as needed.  Patient is not able to take Brovana or lumbar therapy due to allergies.  Patient is on oxygen at 2-2.5 L/min via nasal cannula continuously and receives her oxygen through patient aids.  Patient states that she is currently using NIPPV machine with oxygen bled in and receives her supplies through patient aids as well.  Patient states that she is benefiting from NIPPV machine which helps her to sleep.  Patient denies any morning headaches or excessive daytime sleepiness.  Patient continues to decline referral for lung transplant evaluation.  Patient denies fever, chills, night sweats, swollen glands in the head and neck, unintentional weight loss, hemoptysis, purulent sputum production, dysphagia, chest pain, palpitations, chest tightness, abdominal pain, nausea, vomiting, and diarrhea.  Patient also denies any myalgias, changes in sense of taste and/or smell, sore throat, any other coronavirus or flu-like symptoms.  Patient denies any leg swelling, orthopnea, paroxysmal nocturnal dyspnea.  Patient is able to perform activities of daily living.        Review of Systems     Family History   Problem Relation  Age of Onset    Dementia Mother     Migraines Sister     Stroke Maternal Grandmother         Social History     Socioeconomic History    Marital status: Single   Tobacco Use    Smoking status: Former     Packs/day: 0.50     Years: 20.00     Additional pack years: 0.00     Total pack years: 10.00     Types: Cigarettes     Start date:      Quit date:      Years since quittin.0     Passive exposure: Past    Smokeless tobacco: Never    Tobacco comments:     Patient does not know the start date.    Vaping Use    Vaping Use: Never used   Substance and Sexual Activity    Alcohol use: Not Currently    Drug use: Never    Sexual activity: Defer        Past Medical History:   Diagnosis Date    Anxiety     Asthma     CHF (congestive heart failure)     COPD (chronic obstructive pulmonary disease)     Diabetes mellitus     Disease of thyroid gland     Hyperlipidemia     Hypertension           There is no immunization history on file for this patient.    Allergies   Allergen Reactions    Acetaminophen Unknown - High Severity    Adhesive Tape Unknown - High Severity    Baclofen Unknown - High Severity    Budesonide-Formoterol Fumarate Unknown - High Severity    Cephalexin Unknown - High Severity    Cephalosporins Unknown - High Severity    Codeine Unknown - High Severity    Fluticasone-Salmeterol Unknown - High Severity    Hydrocodone-Acetaminophen Unknown - High Severity    Hydroxyzine Hcl Unknown - High Severity    Iron Unknown - High Severity    Oxycodone Unknown - High Severity    Oxycodone-Acetaminophen Unknown - High Severity    Penicillins Unknown - High Severity    Prednisone Unknown - High Severity    Sulfamethoxazole Unknown - High Severity    Sulfamethoxazole-Trimethoprim Unknown - High Severity    Tiotropium Bromide Monohydrate Unknown - Low Severity    Trimethoprim Unknown - High Severity    Vancomycin Unknown - High Severity    Zafirlukast Unknown - High Severity    Nurtec [Rimegepant Sulfate]  Palpitations     Chest pain    Ubrelvy [Ubrogepant] Palpitations     Patient also had chest pain.           Current Outpatient Medications:     ALPRAZolam (XANAX) 1 MG tablet, Take 1 tablet by mouth 3 (Three) Times a Day As Needed. for anxiety, Disp: , Rfl:     aspirin 81 MG chewable tablet, Chew 1 tablet Daily., Disp: , Rfl:     atenolol (TENORMIN) 50 MG tablet, Take 1 tablet by mouth 2 (Two) Times a Day., Disp: , Rfl:     azelastine (ASTELIN) 0.1 % nasal spray, 1 spray into the nostril(s) as directed by provider Daily As Needed., Disp: , Rfl:     budesonide (Pulmicort) 0.25 MG/2ML nebulizer solution, Take 2 mL by nebulization 2 (Two) Times a Day for 30 days. Rinse mouth out after each use, Disp: 120 mL, Rfl: 11    Chest Congestion Relief 400 MG tablet, Take 1 tablet by mouth Every 12 (Twelve) Hours As Needed., Disp: , Rfl:     Cholecalciferol (Vitamin D) 50 MCG (2000 UT) capsule, Take 1 capsule by mouth Daily., Disp: , Rfl:     cyproheptadine (PERIACTIN) 4 MG tablet, Take 1 tablet by mouth every night at bedtime., Disp: , Rfl:     ipratropium-albuterol (DUO-NEB) 0.5-2.5 mg/3 ml nebulizer, Take 3 mL by nebulization Every 6 (Six) Hours As Needed for Wheezing or Shortness of Air for up to 30 days., Disp: 120 mL, Rfl: 11    Lantus SoloStar 100 UNIT/ML injection pen, Inject 13 Units under the skin into the appropriate area as directed Daily., Disp: , Rfl:     levothyroxine (SYNTHROID, LEVOTHROID) 125 MCG tablet, Take 1 tablet by mouth Daily., Disp: , Rfl:     lisinopril (PRINIVIL,ZESTRIL) 10 MG tablet, Take 1 tablet by mouth 3 (Three) Times a Day As Needed (High Blood Pressure)., Disp: , Rfl:     montelukast (SINGULAIR) 10 MG tablet, Take 1 tablet by mouth Daily. As needed., Disp: , Rfl:     multivitamin with minerals tablet tablet, Take 1 tablet by mouth Daily., Disp: , Rfl:     nitroglycerin (NITROSTAT) 0.4 MG SL tablet, Place 1 tablet under the tongue Every 5 (Five) Minutes As Needed., Disp: , Rfl:     O2 (OXYGEN),  Inhale 3 L/min Daily., Disp: , Rfl:     ondansetron (ZOFRAN) 8 MG tablet, Take 1 tablet by mouth Every 12 (Twelve) Hours., Disp: , Rfl:     sertraline (ZOLOFT) 100 MG tablet, Take 2 tablets by mouth Daily., Disp: , Rfl:     spironolactone (ALDACTONE) 25 MG tablet, Take 1 tablet by mouth 2 (Two) Times a Day., Disp: , Rfl:     Ventolin  (90 Base) MCG/ACT inhaler, Inhale 2 puffs Every 4 (Four) Hours As Needed for Wheezing or Shortness of Air for up to 30 days., Disp: 18 g, Rfl: 11    B-D ULTRAFINE III SHORT PEN 31G X 8 MM misc, USE TO INJECT BASAGLAR EVERY DAY, Disp: , Rfl:     divalproex (DEPAKOTE) 500 MG DR tablet, Take 1 tablet by mouth 2 (Two) Times a Day. (Patient not taking: Reported on 1/31/2024), Disp: 60 tablet, Rfl: 2    Erenumab-aooe (Aimovig) 140 MG/ML auto-injector, Inject 1 mL under the skin into the appropriate area as directed Every 30 (Thirty) Days. (Patient not taking: Reported on 9/19/2023), Disp: 1 mL, Rfl: 11    FREESTYLE LITE test strip, USE AS DIRECTED TO TEST BLOOD SUGAR TWICE DAILY, Disp: , Rfl:     furosemide (LASIX) 20 MG tablet, Take 1 tablet by mouth Daily. (Patient not taking: Reported on 8/7/2023), Disp: , Rfl:     Omega-3 Fatty Acids (fish oil) 1000 MG capsule capsule, Take 1 capsule by mouth 2 (Two) Times a Day. (Patient not taking: Reported on 1/31/2024), Disp: , Rfl:     Probiotic Product (Florajen Digestion) capsule, Take 1 capsule by mouth Daily. (Patient not taking: Reported on 1/31/2024), Disp: , Rfl:     Rimegepant Sulfate (Nurtec) 75 MG tablet dispersible tablet, Take 1 tablet by mouth Daily As Needed (migraines). Take at onset of headache (Max of 75 mg/24 hours) (Patient not taking: Reported on 8/7/2023), Disp: 16 tablet, Rfl: 11     Objective     Physical Exam  Vital Signs:   WDWN, Alert, NAD.    HEENT:  PERRL, EOMI.  OP, nares clear, no sinus tenderness  Neck:  Supple, no JVD, no thyromegaly.  Lymph: no axillary, cervical, supraclavicular lymphadenopathy noted  "bilaterally  Chest:  Diminished breath sounds bilaterally. No wheezes, rales, or rhonchi appreciated.  Normal work of breathing noted.  Patient is able speak full sentences without difficulty.   Patient is on 2 L of oxygen per minute via nasal cannula.  CV: RRR, no MGR, pulses 2+, equal.  Abd:  Soft, NT, ND, + BS, no HSM  EXT:  no clubbing, no cyanosis, no edema, no joint tenderness  Neuro:  A&Ox3, CN grossly intact, no focal deficits.  Skin: No rashes or lesions noted.    BP (!) 118/31 (BP Location: Left arm, Patient Position: Sitting, Cuff Size: Large Adult)   Pulse 81   Temp 97.1 °F (36.2 °C) (Temporal)   Resp 18   Ht 158.8 cm (62.52\")   Wt 98.9 kg (218 lb)   SpO2 90% Comment: 2 liters CF  BMI 39.21 kg/m²         Result Review :   I have reviewed my last office visit note.    Procedures:                  Assessment and Plan      Assessment:  1. Very severe COPD, FEV1 of 13%.  2. Restrictive lung disease.  3. Centrilobular emphysema.  4. Chronic hypoxic and hypercapnic respiratory failure: Patient is on home NIPPV machine and supplemental oxygen.   5.  Tobacco abuse of cigarettes in remission.          Plan:  1. Patient had been referred for pulmonary function testing, however patient continues to decline to have testing completed at this time.   2.  Continue Pulmicort as prescribed and rinse mouth out after each use.  Patient is not able to take Brovana/LABA/LAMA therapy due to multiple allergies.  3.  Continue albuterol inhaler and DuoNeb nebulizer treatments as needed.  4.  Continue NIPPV machine with oxygen bled in at current settings at night and with naps and clean mask and tubing daily.  5.  Continue oxygen to keep SPO2 at 89% and above.  6.  Patient continues to decline referral for lung transplant evaluation.  7.  Vaccination status:  patient declines flu, pneumonia, and COVID-19 vaccinations.  Discussed with patient the benefits of vaccination including decreased risk of severe illness, " hospitalization and death related to flu, pneumonia, and COVID-19.  Patient verbalized understanding and will consider getting vaccinated.  Patient is advised to continue to follow CDC recommendations such as social distancing, wearing a mask, and washing hands for least 20 seconds.  8.  Smoking status: patient is a former cigarette smoker.  9.  Patient to call the office, 911, or go to the ER with new or worsening symptoms.  10.  Follow-up in 4 months, sooner if needed.          Follow Up   Return for 4 months with Dr. Infante.  Patient was given instructions and counseling regarding her condition or for health maintenance advice. Please see specific information pulled into the AVS if appropriate.

## 2024-02-01 RX ORDER — BUDESONIDE 0.5 MG/2ML
INHALANT ORAL
Qty: 60 EACH | Refills: 11 | OUTPATIENT
Start: 2024-02-01

## 2024-02-01 RX ORDER — IPRATROPIUM BROMIDE AND ALBUTEROL SULFATE 2.5; .5 MG/3ML; MG/3ML
SOLUTION RESPIRATORY (INHALATION)
Refills: 11 | OUTPATIENT
Start: 2024-02-01

## 2024-02-16 RX ORDER — IPRATROPIUM BROMIDE AND ALBUTEROL SULFATE 2.5; .5 MG/3ML; MG/3ML
3 SOLUTION RESPIRATORY (INHALATION) EVERY 6 HOURS PRN
Qty: 120 ML | Refills: 11 | OUTPATIENT
Start: 2024-02-16 | End: 2024-03-17

## 2024-02-16 RX ORDER — BUDESONIDE 0.25 MG/2ML
0.25 INHALANT ORAL 2 TIMES DAILY
Qty: 120 ML | Refills: 11 | OUTPATIENT
Start: 2024-02-16 | End: 2024-03-17

## 2024-04-25 ENCOUNTER — TELEPHONE (OUTPATIENT)
Dept: PULMONOLOGY | Facility: CLINIC | Age: 59
End: 2024-04-25

## 2024-04-25 NOTE — TELEPHONE ENCOUNTER
Caller: Nova Manuel    Relationship: Self    Best call back number: [phone]     Requested Prescriptions: budesonide (Pulmicort) 0.25 MG/2ML nebulizer solution [55755] (Order [id number])  Order    ipratropium-albuterol (DUO-NEB) 0.5-2.5 mg/3 ml nebulizer [[id number]] (Order [id number])  Order            Requested Prescriptions      No prescriptions requested or ordered in this encounter        Pharmacy where request should be sent:    [facility] [place], [place] - [address] - [phone]  - [phone]   [address], [place] [address]  Phone: [phone]  Fax: [phone]       Last office visit with prescribing clinician: 2024   Last telemedicine visit with prescribing clinician: Visit date not found   Next office visit with prescribing clinician: Visit date not found     Additional details provided by patient:     Does the patient have less than a 3 day supply:  [] Yes  [] No    Would you like a call back once the refill request has been completed: [] Yes [] No    If the office needs to give you a call back, can they leave a voicemail: [] Yes [] No    Magy Arzola Rep   24 11:42 EDT

## 2024-04-25 NOTE — TELEPHONE ENCOUNTER
Called and spoke with Gwendolyn at [facility] to confirm they had on file that this was sent in 1/2024 with 11 refills. Gwendolyn did verbalized confirmation and is getting her prescription sent out. Called and informed patient. Patient verbalized understanding

## 2024-05-07 NOTE — PROGRESS NOTES
Primary Care Provider  Lizbeth Christensen APRN     Referring Provider  No ref. provider found     Chief Complaint  Cough, Shortness of Breath, Wheezing, Centrilobular emphysema, and Follow-up    Subjective          History of Presenting Illness  Patient is a 59-year-old female, patient Dr. Infante to presents for management of very severe COPD and chronic hypoxic and hypercapnic respiratory failure who presents for a follow-up visit today.  Patient's brother, Memo is present with the patient in the office today. Patient states that since last visit her breathing is at baseline. Patient states that she does get short of breath that is worse with exertion, moderate in severity, and improved with rest. Patient states that she is taking Pulmicort every day prescribed and uses albuterol inhaler and DuoNeb nebulizer treatments as needed. Patient is not able to take Brovana or lumbar therapy due to allergies.  Patient is taking Singulair.  Patient is on oxygen at 2-2.5 L/min via nasal cannula continuously and receives her oxygen through patient aids.  Patient states that she is currently using NIPPV machine with oxygen bled in and receives her supplies through patient aids as well.  Patient denies any morning headaches or excessive daytime sleepiness. Patient denies fever, chills, night sweats, swollen glands in the head and neck, unintentional weight loss, hemoptysis, purulent sputum production, dysphagia, chest pain, palpitations, chest tightness, abdominal pain, nausea, vomiting, and diarrhea.  Patient also denies any myalgias, changes in sense of taste and/or smell, sore throat, any other coronavirus or flu-like symptoms.  Patient denies any leg swelling, orthopnea, paroxysmal nocturnal dyspnea.  Patient is able to perform activities of daily living.        Review of Systems     Family History   Problem Relation Age of Onset    Dementia Mother     Migraines Sister     Stroke Maternal Grandmother         Social History      Socioeconomic History    Marital status: Single   Tobacco Use    Smoking status: Former     Current packs/day: 0.00     Average packs/day: 0.5 packs/day for 27.0 years (13.5 ttl pk-yrs)     Types: Cigarettes     Start date:      Quit date:      Years since quittin.3     Passive exposure: Past    Smokeless tobacco: Never    Tobacco comments:     Patient does not know the start date.    Vaping Use    Vaping status: Never Used   Substance and Sexual Activity    Alcohol use: Not Currently    Drug use: Never    Sexual activity: Defer        Past Medical History:   Diagnosis Date    Anxiety     Asthma     CHF (congestive heart failure)     COPD (chronic obstructive pulmonary disease)     Diabetes mellitus     Disease of thyroid gland     Hyperlipidemia     Hypertension           There is no immunization history on file for this patient.    Allergies   Allergen Reactions    Levaquin [Levofloxacin] Shortness Of Breath and Swelling     Joint pain, very tired , swelling in legs and feet, low grade fevers.     Acetaminophen Unknown - High Severity    Adhesive Tape Unknown - High Severity    Baclofen Unknown - High Severity    Budesonide-Formoterol Fumarate Unknown - High Severity    Cephalexin Unknown - High Severity    Cephalosporins Unknown - High Severity    Codeine Unknown - High Severity    Fluticasone-Salmeterol Unknown - High Severity    Hydrocodone-Acetaminophen Unknown - High Severity    Hydroxyzine Hcl Unknown - High Severity    Iron Unknown - High Severity    Oxycodone Unknown - High Severity    Oxycodone-Acetaminophen Unknown - High Severity    Penicillins Unknown - High Severity    Prednisone Unknown - High Severity    Sulfamethoxazole Unknown - High Severity    Sulfamethoxazole-Trimethoprim Unknown - High Severity    Tiotropium Bromide Monohydrate Unknown - Low Severity    Trimethoprim Unknown - High Severity    Vancomycin Unknown - High Severity    Zafirlukast Unknown - High Severity    Nurtec  [Rimegepant Sulfate] Palpitations     Chest pain    Ubrelvy [Ubrogepant] Palpitations     Patient also had chest pain.           Current Outpatient Medications:     ALPRAZolam (XANAX) 1 MG tablet, Take 1 tablet by mouth 3 (Three) Times a Day As Needed. for anxiety, Disp: , Rfl:     aspirin 81 MG chewable tablet, Chew 1 tablet Daily., Disp: , Rfl:     atenolol (TENORMIN) 50 MG tablet, Take 1 tablet by mouth 2 (Two) Times a Day., Disp: , Rfl:     azelastine (ASTELIN) 0.1 % nasal spray, 1 spray into the nostril(s) as directed by provider Daily As Needed., Disp: , Rfl:     budesonide (Pulmicort) 0.25 MG/2ML nebulizer solution, Take 2 mL by nebulization 2 (Two) Times a Day for 30 days. Rinse mouth out after each use, Disp: 120 mL, Rfl: 11    Chest Congestion Relief 400 MG tablet, Take 1 tablet by mouth Every 12 (Twelve) Hours As Needed., Disp: , Rfl:     Cholecalciferol (Vitamin D) 50 MCG (2000 UT) capsule, Take 1 capsule by mouth Daily., Disp: , Rfl:     furosemide (LASIX) 20 MG tablet, Take 1 tablet by mouth Daily. As needed, Disp: , Rfl:     ipratropium-albuterol (DUO-NEB) 0.5-2.5 mg/3 ml nebulizer, Take 3 mL by nebulization Every 6 (Six) Hours As Needed for Wheezing or Shortness of Air for up to 30 days., Disp: 120 mL, Rfl: 11    Lantus SoloStar 100 UNIT/ML injection pen, Inject 13 Units under the skin into the appropriate area as directed Daily., Disp: , Rfl:     levothyroxine (SYNTHROID, LEVOTHROID) 125 MCG tablet, Take 1 tablet by mouth Daily., Disp: , Rfl:     lisinopril (PRINIVIL,ZESTRIL) 10 MG tablet, Take 1 tablet by mouth 3 (Three) Times a Day As Needed (High Blood Pressure)., Disp: , Rfl:     montelukast (SINGULAIR) 10 MG tablet, Take 1 tablet by mouth Every Night for 90 days. As needed., Disp: 90 tablet, Rfl: 3    multivitamin with minerals tablet tablet, Take 1 tablet by mouth Daily., Disp: , Rfl:     nitroglycerin (NITROSTAT) 0.4 MG SL tablet, Place 1 tablet under the tongue Every 5 (Five) Minutes As  Needed., Disp: , Rfl:     O2 (OXYGEN), Inhale 3 L/min Daily., Disp: , Rfl:     ondansetron (ZOFRAN) 8 MG tablet, Take 1 tablet by mouth Every 12 (Twelve) Hours., Disp: , Rfl:     Probiotic Product (Florajen Digestion) capsule, Take 1 capsule by mouth Daily., Disp: , Rfl:     sertraline (ZOLOFT) 100 MG tablet, Take 2 tablets by mouth Daily., Disp: , Rfl:     spironolactone (ALDACTONE) 25 MG tablet, Take 1 tablet by mouth 2 (Two) Times a Day., Disp: , Rfl:     Ventolin  (90 Base) MCG/ACT inhaler, Inhale 2 puffs Every 4 (Four) Hours As Needed for Wheezing or Shortness of Air for up to 90 days., Disp: 54 g, Rfl: 3    B-D ULTRAFINE III SHORT PEN 31G X 8 MM misc, USE TO INJECT BASAGLAR EVERY DAY, Disp: , Rfl:     cyproheptadine (PERIACTIN) 4 MG tablet, Take 1 tablet by mouth every night at bedtime., Disp: , Rfl:     divalproex (DEPAKOTE) 500 MG DR tablet, Take 1 tablet by mouth 2 (Two) Times a Day. (Patient not taking: Reported on 1/31/2024), Disp: 60 tablet, Rfl: 2    Erenumab-aooe (Aimovig) 140 MG/ML auto-injector, Inject 1 mL under the skin into the appropriate area as directed Every 30 (Thirty) Days. (Patient not taking: Reported on 9/19/2023), Disp: 1 mL, Rfl: 11    FREESTYLE LITE test strip, USE AS DIRECTED TO TEST BLOOD SUGAR TWICE DAILY, Disp: , Rfl:     Omega-3 Fatty Acids (fish oil) 1000 MG capsule capsule, Take 1 capsule by mouth 2 (Two) Times a Day. (Patient not taking: Reported on 1/31/2024), Disp: , Rfl:     Rimegepant Sulfate (Nurtec) 75 MG tablet dispersible tablet, Take 1 tablet by mouth Daily As Needed (migraines). Take at onset of headache (Max of 75 mg/24 hours) (Patient not taking: Reported on 8/7/2023), Disp: 16 tablet, Rfl: 11     Objective     Physical Exam  Vital Signs:   WDWN, Alert, NAD.    HEENT:  PERRL, EOMI.  OP, nares clear, no sinus tenderness  Neck:  Supple, no JVD, no thyromegaly.  Lymph: no axillary, cervical, supraclavicular lymphadenopathy noted bilaterally  Chest:  Diminished  breath sounds bilaterally. No wheezes, rales, or rhonchi appreciated.  Normal work of breathing noted.  Patient is able speak full sentences without difficulty.   Patient is on 2 L of oxygen per minute via nasal cannula. good aeration, clear to auscultation bilaterally, tympanic to percussion bilaterally, no work of breathing noted  CV: RRR, no MGR, pulses 2+, equal.  Abd:  Soft, NT, ND, + BS, no HSM  EXT:  no clubbing, no cyanosis, no edema, no joint tenderness  Neuro:  A&Ox3, CN grossly intact, no focal deficits.  Skin: No rashes or lesions noted.    There were no vitals taken for this visit.        Result Review :   I have reviewed my last office visit note.    Procedures:           Assessment and Plan      Assessment:  1. Very severe COPD, FEV1 of 13% predicted.  2. Restrictive lung disease.  3. Centrilobular emphysema.  4. Chronic hypoxic and hypercapnic respiratory failure: Patient is on home NIPPV machine and supplemental oxygen.   5.  Tobacco abuse of cigarettes in remission.        Plan:  1. Patient had been referred for pulmonary function testing, however patient continues to decline to have testing completed at this time.   2.  Continue Pulmicort as prescribed and rinse mouth out after each use.  Patient is not able to take Brovana/LABA/LAMA therapy due to multiple allergies.  3.  Continue albuterol inhaler and DuoNeb nebulizer treatments as needed.  4.  Continue NIPPV machine with oxygen bled in at current settings at night and with naps and clean mask and tubing daily.  5.  Continue oxygen to keep SPO2 at 89% and above.  6.  Continue Singulair.  7.  Patient continues to decline referral for lung transplant evaluation.  8.  Vaccination status:  patient declines flu, pneumonia, and COVID-19 vaccinations.  Discussed with patient the benefits of vaccination including decreased risk of severe illness, hospitalization and death related to flu, pneumonia, and COVID-19.  Patient verbalized understanding and  will consider getting vaccinated.  Patient is advised to continue to follow CDC recommendations such as social distancing, wearing a mask, and washing hands for least 20 seconds.  9.  Smoking status: patient is a former cigarette smoker.  10.  Patient to call the office, 911, or go to the ER with new or worsening symptoms.  11.  Follow-up in 3 to 4 months, sooner if needed.          Follow Up   Return for 3-4 months.  Patient was given instructions and counseling regarding her condition or for health maintenance advice. Please see specific information pulled into the AVS if appropriate.

## 2024-05-09 ENCOUNTER — TELEPHONE (OUTPATIENT)
Dept: PULMONOLOGY | Facility: CLINIC | Age: 59
End: 2024-05-09
Payer: COMMERCIAL

## 2024-05-13 ENCOUNTER — OFFICE VISIT (OUTPATIENT)
Dept: PULMONOLOGY | Facility: CLINIC | Age: 59
End: 2024-05-13
Payer: COMMERCIAL

## 2024-05-13 DIAGNOSIS — J44.9 CHRONIC OBSTRUCTIVE PULMONARY DISEASE, UNSPECIFIED COPD TYPE: ICD-10-CM

## 2024-05-13 DIAGNOSIS — J96.11 CHRONIC RESPIRATORY FAILURE WITH HYPOXIA AND HYPERCAPNIA: ICD-10-CM

## 2024-05-13 DIAGNOSIS — J43.2 CENTRILOBULAR EMPHYSEMA: ICD-10-CM

## 2024-05-13 DIAGNOSIS — F17.201 TOBACCO ABUSE, IN REMISSION: Primary | ICD-10-CM

## 2024-05-13 DIAGNOSIS — J96.12 CHRONIC RESPIRATORY FAILURE WITH HYPOXIA AND HYPERCAPNIA: ICD-10-CM

## 2024-05-13 DIAGNOSIS — J98.4 RESTRICTIVE LUNG DISEASE: ICD-10-CM

## 2024-05-13 PROCEDURE — 1160F RVW MEDS BY RX/DR IN RCRD: CPT | Performed by: NURSE PRACTITIONER

## 2024-05-13 PROCEDURE — 99214 OFFICE O/P EST MOD 30 MIN: CPT | Performed by: NURSE PRACTITIONER

## 2024-05-13 PROCEDURE — 1159F MED LIST DOCD IN RCRD: CPT | Performed by: NURSE PRACTITIONER

## 2024-05-13 RX ORDER — MONTELUKAST SODIUM 10 MG/1
10 TABLET ORAL NIGHTLY
Qty: 90 TABLET | Refills: 3 | Status: SHIPPED | OUTPATIENT
Start: 2024-05-13 | End: 2024-08-11

## 2024-05-13 RX ORDER — ALBUTEROL SULFATE 90 UG/1
2 AEROSOL, METERED RESPIRATORY (INHALATION) EVERY 4 HOURS PRN
Qty: 54 G | Refills: 3 | Status: SHIPPED | OUTPATIENT
Start: 2024-05-13 | End: 2024-08-11

## 2024-05-13 RX ORDER — ALBUTEROL SULFATE 90 UG/1
2 AEROSOL, METERED RESPIRATORY (INHALATION) EVERY 4 HOURS PRN
COMMUNITY
Start: 2024-05-10 | End: 2024-05-13 | Stop reason: SDUPTHER

## 2024-08-07 NOTE — PROGRESS NOTES
Primary Care Provider  Lizbeth Sims APRN     Referring Provider  No ref. provider found     Chief Complaint  Cough, Shortness of Breath, Wheezing, and Follow-up (3-4 month follow up. )    Subjective          History of Presenting Illness  Patient is a 59-year-old female, patient Dr. Infante to presents for management of very severe COPD and chronic hypoxic and hypercapnic respiratory failure who presents for a follow-up visit today.  Patient states that since last visit her breathing is at baseline. Patient states that she does get short of breath that is worse with exertion, moderate in severity, and improved with rest. Patient states that she is taking Pulmicort every day prescribed and uses albuterol inhaler and DuoNeb nebulizer treatments as needed. Patient is not able to take Brovana/LABA/LAMA therapy due to multiple allergies per patient report.  Patient is also taking Singulair, Zyrtec, and Astelin nasal spray for seasonal allergies.  Patient is on oxygen at 2-2.5 L/min via nasal cannula continuously and receives her oxygen through patient aids.  Patient states that she is currently using NIPPV machine with oxygen bled in and receives her supplies through patient aids as well.  Patient denies any morning headaches or excessive daytime sleepiness.  Patient denies fever, chills, night sweats, swollen glands in the head and neck, unintentional weight loss, hemoptysis, purulent sputum production, dysphagia, chest pain, palpitations, chest tightness, abdominal pain, nausea, vomiting, and diarrhea.  Patient also denies any myalgias, changes in sense of taste and/or smell, sore throat, any other coronavirus or flu-like symptoms.  Patient denies any leg swelling, orthopnea, paroxysmal nocturnal dyspnea.  Patient is able to perform activities of daily living.        Review of Systems     Family History   Problem Relation Age of Onset    Dementia Mother     Migraines Sister     Stroke Maternal Grandmother          Social History     Socioeconomic History    Marital status: Single   Tobacco Use    Smoking status: Former     Current packs/day: 0.00     Average packs/day: 0.5 packs/day for 27.0 years (13.5 ttl pk-yrs)     Types: Cigarettes     Start date:      Quit date:      Years since quittin.6     Passive exposure: Past    Smokeless tobacco: Never    Tobacco comments:     Patient does not know the start date.    Vaping Use    Vaping status: Never Used   Substance and Sexual Activity    Alcohol use: Not Currently    Drug use: Never    Sexual activity: Defer        Past Medical History:   Diagnosis Date    Anxiety     Asthma     CHF (congestive heart failure)     COPD (chronic obstructive pulmonary disease)     Diabetes mellitus     Disease of thyroid gland     Hyperlipidemia     Hypertension           There is no immunization history on file for this patient.    Allergies   Allergen Reactions    Levaquin [Levofloxacin] Shortness Of Breath and Swelling     Joint pain, very tired , swelling in legs and feet, low grade fevers.     Acetaminophen Unknown - High Severity    Adhesive Tape Unknown - High Severity    Baclofen Unknown - High Severity    Budesonide-Formoterol Fumarate Unknown - High Severity    Cephalexin Unknown - High Severity    Cephalosporins Unknown - High Severity    Codeine Unknown - High Severity    Fluticasone-Salmeterol Unknown - High Severity    Hydrocodone-Acetaminophen Unknown - High Severity    Hydroxyzine Hcl Unknown - High Severity    Iron Unknown - High Severity    Oxycodone Unknown - High Severity    Oxycodone-Acetaminophen Unknown - High Severity    Penicillins Unknown - High Severity    Prednisone Unknown - High Severity    Sulfamethoxazole Unknown - High Severity    Sulfamethoxazole-Trimethoprim Unknown - High Severity    Tiotropium Bromide Monohydrate Unknown - Low Severity    Trimethoprim Unknown - High Severity    Vancomycin Unknown - High Severity    Zafirlukast Unknown - High  Severity    Nurtec [Rimegepant Sulfate] Palpitations     Chest pain    Ubrelvy [Ubrogepant] Palpitations     Patient also had chest pain.           Current Outpatient Medications:     ALPRAZolam (XANAX) 1 MG tablet, Take 1 tablet by mouth 3 (Three) Times a Day As Needed. for anxiety, Disp: , Rfl:     aspirin 81 MG chewable tablet, Chew 1 tablet Daily., Disp: , Rfl:     atenolol (TENORMIN) 50 MG tablet, Take 1 tablet by mouth 2 (Two) Times a Day., Disp: , Rfl:     azelastine (ASTELIN) 0.1 % nasal spray, 1 spray into the nostril(s) as directed by provider Daily As Needed., Disp: , Rfl:     budesonide (Pulmicort) 0.25 MG/2ML nebulizer solution, Take 2 mL by nebulization 2 (Two) Times a Day for 30 days. Rinse mouth out after each use, Disp: 120 mL, Rfl: 11    cetirizine (zyrTEC) 10 MG tablet, Take 1 tablet by mouth Daily. As needed., Disp: , Rfl:     Chest Congestion Relief 400 MG tablet, Take 1 tablet by mouth Every 12 (Twelve) Hours As Needed., Disp: , Rfl:     Cholecalciferol (Vitamin D) 50 MCG (2000 UT) capsule, Take 1 capsule by mouth Daily., Disp: , Rfl:     furosemide (LASIX) 20 MG tablet, Take 1 tablet by mouth Daily. As needed, Disp: , Rfl:     ipratropium-albuterol (DUO-NEB) 0.5-2.5 mg/3 ml nebulizer, Take 3 mL by nebulization Every 6 (Six) Hours As Needed for Wheezing or Shortness of Air for up to 30 days., Disp: 120 mL, Rfl: 11    Lantus SoloStar 100 UNIT/ML injection pen, Inject 13 Units under the skin into the appropriate area as directed Daily., Disp: , Rfl:     levothyroxine (SYNTHROID, LEVOTHROID) 125 MCG tablet, Take 1 tablet by mouth Daily., Disp: , Rfl:     lisinopril (PRINIVIL,ZESTRIL) 10 MG tablet, Take 1 tablet by mouth 3 (Three) Times a Day As Needed (High Blood Pressure)., Disp: , Rfl:     montelukast (SINGULAIR) 10 MG tablet, Take 1 tablet by mouth Every Night for 90 days. As needed., Disp: 90 tablet, Rfl: 3    multivitamin with minerals tablet tablet, Take 1 tablet by mouth Daily., Disp: ,  Rfl:     nitroglycerin (NITROSTAT) 0.4 MG SL tablet, Place 1 tablet under the tongue Every 5 (Five) Minutes As Needed., Disp: , Rfl:     O2 (OXYGEN), Inhale 3 L/min Daily., Disp: , Rfl:     Omega-3 Fatty Acids (fish oil) 1000 MG capsule capsule, Take 1 capsule by mouth 2 (Two) Times a Day., Disp: , Rfl:     ondansetron (ZOFRAN) 8 MG tablet, Take 1 tablet by mouth Every 12 (Twelve) Hours., Disp: , Rfl:     Probiotic Product (Florajen Digestion) capsule, Take 1 capsule by mouth Daily., Disp: , Rfl:     sertraline (ZOLOFT) 100 MG tablet, Take 2 tablets by mouth Daily., Disp: , Rfl:     spironolactone (ALDACTONE) 25 MG tablet, Take 1 tablet by mouth 2 (Two) Times a Day., Disp: , Rfl:     B-D ULTRAFINE III SHORT PEN 31G X 8 MM misc, USE TO INJECT BASAGLAR EVERY DAY, Disp: , Rfl:     cyproheptadine (PERIACTIN) 4 MG tablet, Take 1 tablet by mouth every night at bedtime. (Patient not taking: Reported on 8/19/2024), Disp: , Rfl:     divalproex (DEPAKOTE) 500 MG DR tablet, Take 1 tablet by mouth 2 (Two) Times a Day. (Patient not taking: Reported on 1/31/2024), Disp: 60 tablet, Rfl: 2    Erenumab-aooe (Aimovig) 140 MG/ML auto-injector, Inject 1 mL under the skin into the appropriate area as directed Every 30 (Thirty) Days. (Patient not taking: Reported on 9/19/2023), Disp: 1 mL, Rfl: 11    FREESTYLE LITE test strip, USE AS DIRECTED TO TEST BLOOD SUGAR TWICE DAILY, Disp: , Rfl:     Rimegepant Sulfate (Nurtec) 75 MG tablet dispersible tablet, Take 1 tablet by mouth Daily As Needed (migraines). Take at onset of headache (Max of 75 mg/24 hours) (Patient not taking: Reported on 8/7/2023), Disp: 16 tablet, Rfl: 11     Objective     Physical Exam  Vital Signs:   WDWN, Alert, NAD.    HEENT:  PERRL, EOMI.  OP, nares clear, no sinus tenderness  Neck:  Supple, no JVD, no thyromegaly.  Lymph: no axillary, cervical, supraclavicular lymphadenopathy noted bilaterally  Chest:  Diminished breath sounds bilaterally. No wheezes, rales, or  "rhonchi appreciated.  Normal work of breathing noted.  Patient is able speak full sentences without difficulty.   Patient is on 2 L of oxygen per minute via nasal cannula. good aeration, clear to auscultation bilaterally, tympanic to percussion bilaterally, no work of breathing noted   CV: RRR, no MGR, pulses 2+, equal.  Abd:  Soft, NT, ND, + BS, no HSM  EXT:  no clubbing, no cyanosis, no edema, no joint tenderness  Neuro:  A&Ox3, CN grossly intact, no focal deficits.  Skin: No rashes or lesions noted.    BP (!) 85/60 (BP Location: Left arm, Patient Position: Sitting, Cuff Size: Large Adult)   Pulse 74   Temp 97.6 °F (36.4 °C) (Oral)   Resp 16   Ht 158.8 cm (62.52\")   Wt 94 kg (207 lb 3.2 oz)   SpO2 92% Comment: 2 liters of oxygen CF  BMI 37.27 kg/m²         Result Review :   I have reviewed my last office visit note.    Procedures:           Assessment and Plan      Assessment:  1. Very severe COPD, FEV1 of 13% predicted.  2. Restrictive lung disease.  3. Centrilobular emphysema.  4. Chronic hypoxic and hypercapnic respiratory failure: Patient is on home NIPPV machine and supplemental oxygen.   5. Tobacco abuse of cigarettes in remission.          Plan:  1.  Patient continues to decline to have testing completed at this time.   2.  Continue Pulmicort as prescribed and rinse mouth out after each use.  Patient is not able to take Brovana/LABA/LAMA therapy due to multiple allergies.  3.  Continue albuterol inhaler and DuoNeb nebulizer treatments as needed.  4.  Continue NIPPV machine with oxygen bled in at current settings at night and with naps and clean mask and tubing daily.  5.  Continue oxygen to keep SPO2 at 89% and above.  6.  Continue Singulair, Zyrtec, and Astelin nasal spray. .  7.  Patient continues to decline referral for lung transplant evaluation.  8.  Vaccination status:  patient declines flu, pneumonia, and COVID-19 vaccinations.  Discussed with patient the benefits of vaccination including " decreased risk of severe illness, hospitalization and death related to flu, pneumonia, and COVID-19.  Patient verbalized understanding and will consider getting vaccinated.  Patient is advised to continue to follow CDC recommendations such as social distancing, wearing a mask, and washing hands for least 20 seconds.  9.  Smoking status: patient is a former cigarette smoker.  10.  Patient to call the office, 911, or go to the ER with new or worsening symptoms.  11.  Follow-up in November 2024, sooner if needed.               Follow Up   Return for November 2024.  Patient was given instructions and counseling regarding her condition or for health maintenance advice. Please see specific information pulled into the AVS if appropriate.

## 2024-08-19 ENCOUNTER — TELEPHONE (OUTPATIENT)
Dept: PULMONOLOGY | Facility: CLINIC | Age: 59
End: 2024-08-19

## 2024-08-19 ENCOUNTER — OFFICE VISIT (OUTPATIENT)
Dept: PULMONOLOGY | Facility: CLINIC | Age: 59
End: 2024-08-19
Payer: COMMERCIAL

## 2024-08-19 VITALS
TEMPERATURE: 97.6 F | HEIGHT: 63 IN | SYSTOLIC BLOOD PRESSURE: 85 MMHG | HEART RATE: 74 BPM | OXYGEN SATURATION: 92 % | DIASTOLIC BLOOD PRESSURE: 60 MMHG | BODY MASS INDEX: 36.71 KG/M2 | WEIGHT: 207.2 LBS | RESPIRATION RATE: 16 BRPM

## 2024-08-19 DIAGNOSIS — J43.2 CENTRILOBULAR EMPHYSEMA: ICD-10-CM

## 2024-08-19 DIAGNOSIS — J96.11 CHRONIC RESPIRATORY FAILURE WITH HYPOXIA AND HYPERCAPNIA: ICD-10-CM

## 2024-08-19 DIAGNOSIS — F17.201 TOBACCO ABUSE, IN REMISSION: Primary | ICD-10-CM

## 2024-08-19 DIAGNOSIS — J96.12 CHRONIC RESPIRATORY FAILURE WITH HYPOXIA AND HYPERCAPNIA: ICD-10-CM

## 2024-08-19 DIAGNOSIS — J44.9 CHRONIC OBSTRUCTIVE PULMONARY DISEASE, UNSPECIFIED COPD TYPE: ICD-10-CM

## 2024-08-19 DIAGNOSIS — J98.4 RESTRICTIVE LUNG DISEASE: ICD-10-CM

## 2024-08-19 PROCEDURE — 1159F MED LIST DOCD IN RCRD: CPT | Performed by: NURSE PRACTITIONER

## 2024-08-19 PROCEDURE — 99214 OFFICE O/P EST MOD 30 MIN: CPT | Performed by: NURSE PRACTITIONER

## 2024-08-19 PROCEDURE — 1160F RVW MEDS BY RX/DR IN RCRD: CPT | Performed by: NURSE PRACTITIONER

## 2024-08-19 RX ORDER — IPRATROPIUM BROMIDE AND ALBUTEROL SULFATE 2.5; .5 MG/3ML; MG/3ML
3 SOLUTION RESPIRATORY (INHALATION) EVERY 6 HOURS PRN
Qty: 120 ML | Refills: 11 | Status: SHIPPED | OUTPATIENT
Start: 2024-08-19 | End: 2024-09-18

## 2024-08-19 RX ORDER — CETIRIZINE HYDROCHLORIDE 10 MG/1
1 TABLET ORAL DAILY
COMMUNITY
Start: 2024-08-10

## 2024-08-19 RX ORDER — BUDESONIDE 0.25 MG/2ML
0.25 INHALANT ORAL 2 TIMES DAILY
Qty: 120 ML | Refills: 11 | Status: SHIPPED | OUTPATIENT
Start: 2024-08-19 | End: 2024-09-18

## 2024-09-16 ENCOUNTER — TELEPHONE (OUTPATIENT)
Dept: PULMONOLOGY | Facility: CLINIC | Age: 59
End: 2024-09-16
Payer: COMMERCIAL

## 2024-10-30 ENCOUNTER — TELEPHONE (OUTPATIENT)
Dept: PULMONOLOGY | Facility: CLINIC | Age: 59
End: 2024-10-30

## 2024-10-30 NOTE — TELEPHONE ENCOUNTER
Caller: Nova Manuel    Relationship to patient: Self    Best call back number: [phone]     Patient is needing: PT CALLING TO NOTIFY DANN THAT PT WILL BE DROPPING OFF PAPERWORK WITH  ON 10/30/24, PT REQUESTS CALLBACK TO ADVISE ON PAPERWORK ASAP.

## 2024-11-03 NOTE — PROGRESS NOTES
Primary Care Provider  Lizbeth Sims APRN     Referring Provider  No ref. provider found     Chief Complaint  Follow-up (3 Months) and COPD    Subjective          History of Presenting Illness  Patient is a 59-year-old female, patient Dr. Infante to presents for management of very severe COPD and chronic hypoxic and hypercapnic respiratory failure who presents for a follow-up visit today.  Patient states that since last visit her breathing is at baseline. Patient states that she does get short of breath that is worse with exertion, moderate in severity, and improved with rest. Patient states that she is taking Pulmicort every day prescribed and uses albuterol inhaler and DuoNeb nebulizer treatments as needed. Patient is not able to take Brovana/LABA/LAMA therapy due to multiple allergies per patient report.  Patient is also taking Singulair, Zyrtec, Flonase nasal spray, and Astelin nasal spray for seasonal allergies.  Patient is on oxygen at 2-2.5 L/min via nasal cannula continuously and receives her oxygen through patient aids.  Patient states that she is currently using NIPPV machine with oxygen bled in and receives her supplies through patient aids as well.  Patient denies any morning headaches or excessive daytime sleepiness.  Patient denies fever, chills, night sweats, swollen glands in the head and neck, unintentional weight loss, hemoptysis, purulent sputum production, dysphagia, chest pain, palpitations, chest tightness, abdominal pain, nausea, vomiting, and diarrhea.  Patient also denies any myalgias, changes in sense of taste and/or smell, sore throat, any other coronavirus or flu-like symptoms.  Patient denies any leg swelling, orthopnea, paroxysmal nocturnal dyspnea.  Patient is able to perform activities of daily living.        Review of Systems     Family History   Problem Relation Age of Onset    Dementia Mother     Migraines Sister     Stroke Maternal Grandmother         Social History  Subjective:      Patient ID: Saravanan Fernandes is a 2 y.o. male.    Chief Complaint: Follow-up (6 month tube check )    Patient is a very pleasant 2 year old child here to see me today after PET placement 5/2022.  His parent says that he has been doing very well since their last visit.  He has not had any recent ear infections or episodes of ear drainage.  His parent has no concerns regarding child's hearing, and his speech and language development is appropriate for his age.          Review of Systems   Constitutional: Negative.    HENT: Negative.     Eyes: Negative.    Respiratory: Negative.     Cardiovascular: Negative.    Gastrointestinal: Negative.    Endocrine: Negative.    Genitourinary: Negative.    Musculoskeletal: Negative.    Skin: Negative.    Allergic/Immunologic: Negative.    Neurological: Negative.    Hematological: Negative.    Psychiatric/Behavioral: Negative.       Objective:       Physical Exam  Constitutional:       General: He is active.      Appearance: He is well-developed.   HENT:      Head: Normocephalic and atraumatic.      Jaw: There is normal jaw occlusion.      Right Ear: Tympanic membrane and external ear normal. No drainage. A PE tube is present.      Left Ear: Tympanic membrane and external ear normal. No drainage. A PE tube (extruded in ear canal) is present.      Nose: Nose normal. No congestion or rhinorrhea.      Mouth/Throat:      Mouth: Mucous membranes are moist.      Pharynx: Oropharynx is clear.      Tonsils: 2+ on the right. 2+ on the left.   Eyes:      Conjunctiva/sclera: Conjunctivae normal.      Pupils: Pupils are equal, round, and reactive to light.   Cardiovascular:      Rate and Rhythm: Normal rate.   Pulmonary:      Effort: Pulmonary effort is normal. No accessory muscle usage, respiratory distress or retractions.      Breath sounds: Normal air entry. No stridor.   Musculoskeletal:      Cervical back: Neck supple.   Neurological:      Mental Status: He is alert.           Socioeconomic History    Marital status: Single   Tobacco Use    Smoking status: Former     Current packs/day: 0.00     Average packs/day: 0.5 packs/day for 27.0 years (13.5 ttl pk-yrs)     Types: Cigarettes     Start date:      Quit date:      Years since quittin.8     Passive exposure: Past    Smokeless tobacco: Never    Tobacco comments:     Patient does not know the start date.    Vaping Use    Vaping status: Never Used   Substance and Sexual Activity    Alcohol use: Not Currently    Drug use: Never    Sexual activity: Defer        Past Medical History:   Diagnosis Date    Anxiety     Asthma     CHF (congestive heart failure)     COPD (chronic obstructive pulmonary disease)     Diabetes mellitus     Disease of thyroid gland     Hyperlipidemia     Hypertension           There is no immunization history on file for this patient.    Allergies   Allergen Reactions    Levaquin [Levofloxacin] Shortness Of Breath and Swelling     Joint pain, very tired , swelling in legs and feet, low grade fevers.     Acetaminophen Unknown - High Severity    Adhesive Tape Unknown - High Severity    Baclofen Unknown - High Severity    Budesonide-Formoterol Fumarate Unknown - High Severity    Cephalexin Unknown - High Severity    Cephalosporins Unknown - High Severity    Codeine Unknown - High Severity    Fluticasone-Salmeterol Unknown - High Severity    Hydrocodone-Acetaminophen Unknown - High Severity    Hydroxyzine Hcl Unknown - High Severity    Iron Unknown - High Severity    Oxycodone Unknown - High Severity    Oxycodone-Acetaminophen Unknown - High Severity    Penicillins Unknown - High Severity    Prednisone Unknown - High Severity    Sulfamethoxazole Unknown - High Severity    Sulfamethoxazole-Trimethoprim Unknown - High Severity    Tiotropium Bromide Monohydrate Unknown - Low Severity    Trimethoprim Unknown - High Severity    Vancomycin Unknown - High Severity    Zafirlukast Unknown - High Severity    Nurtec  Motor: He walks.       Assessment:       1. Patent tympanostomy tube        Plan:     Patent tympanostomy tube      Discussed with the child's parent that one tube has now extruded and the tympanic membrane has healed.  At this time, I would recommend close observation.  If the ear without the tube now begins to have issue with recurrent ear infections, would then consider ear tube replacement.  Hopefully, that will not be an issue and no further intervention will be required.  Return to clinic in six months, sooner if the child develops any issue with recurrent ear infections, ear pain, or ear drainage.    [Rimegepant Sulfate] Palpitations     Chest pain    Ubrelvy [Ubrogepant] Palpitations     Patient also had chest pain.           Current Outpatient Medications:     ALPRAZolam (XANAX) 1 MG tablet, Take 1 tablet by mouth 3 (Three) Times a Day As Needed. for anxiety, Disp: , Rfl:     aspirin 81 MG chewable tablet, Chew 1 tablet Daily., Disp: , Rfl:     atenolol (TENORMIN) 50 MG tablet, Take 1 tablet by mouth 2 (Two) Times a Day., Disp: , Rfl:     azelastine (ASTELIN) 0.1 % nasal spray, Administer 1 spray into the nostril(s) as directed by provider Daily As Needed., Disp: , Rfl:     B-D ULTRAFINE III SHORT PEN 31G X 8 MM misc, USE TO INJECT BASAGLAR EVERY DAY, Disp: , Rfl:     budesonide (Pulmicort) 0.25 MG/2ML nebulizer solution, Take 2 mL by nebulization 2 (Two) Times a Day for 30 days. Rinse mouth out after each use, Disp: 120 mL, Rfl: 5    cetirizine (zyrTEC) 10 MG tablet, Take 1 tablet by mouth Daily. As needed., Disp: , Rfl:     Chest Congestion Relief 400 MG tablet, Take 1 tablet by mouth Every 12 (Twelve) Hours As Needed., Disp: , Rfl:     Cholecalciferol (Vitamin D) 50 MCG (2000 UT) capsule, Take 1 capsule by mouth Daily., Disp: , Rfl:     Erenumab-aooe (Aimovig) 140 MG/ML auto-injector, Inject 1 mL under the skin into the appropriate area as directed Every 30 (Thirty) Days., Disp: 1 mL, Rfl: 11    fluticasone (FLONASE) 50 MCG/ACT nasal spray, SHAKE LIQUID AND USE 1 SPRAY IN EACH NOSTRIL EVERY DAY, Disp: , Rfl:     FREESTYLE LITE test strip, USE AS DIRECTED TO TEST BLOOD SUGAR TWICE DAILY, Disp: , Rfl:     furosemide (LASIX) 20 MG tablet, Take 1 tablet by mouth Daily. As needed, Disp: , Rfl:     ipratropium-albuterol (DUO-NEB) 0.5-2.5 mg/3 ml nebulizer, Take 3 mL by nebulization Every 6 (Six) Hours As Needed for Wheezing or Shortness of Air for up to 30 days., Disp: 120 mL, Rfl: 5    Lancets (freestyle) lancets, 1 each by Other route 3 (Three) Times a Day., Disp: , Rfl:     Lantus SoloStar 100 UNIT/ML  injection pen, Inject 13 Units under the skin into the appropriate area as directed Daily., Disp: , Rfl:     levothyroxine (SYNTHROID, LEVOTHROID) 125 MCG tablet, Take 1 tablet by mouth Daily., Disp: , Rfl:     lisinopril (PRINIVIL,ZESTRIL) 10 MG tablet, Take 1 tablet by mouth 3 (Three) Times a Day As Needed (High Blood Pressure)., Disp: , Rfl:     montelukast (SINGULAIR) 10 MG tablet, Take 1 tablet by mouth Every Night for 90 days. As needed., Disp: 90 tablet, Rfl: 3    multivitamin with minerals tablet tablet, Take 1 tablet by mouth Daily., Disp: , Rfl:     nitroglycerin (NITROSTAT) 0.4 MG SL tablet, Place 1 tablet under the tongue Every 5 (Five) Minutes As Needed., Disp: , Rfl:     O2 (OXYGEN), Inhale 3 L/min Daily., Disp: , Rfl:     Omega-3 Fatty Acids (fish oil) 1000 MG capsule capsule, Take 1 capsule by mouth 2 (Two) Times a Day., Disp: , Rfl:     ondansetron (ZOFRAN) 8 MG tablet, Take 1 tablet by mouth Every 12 (Twelve) Hours., Disp: , Rfl:     Probiotic Product (Florajen Digestion) capsule, Take 1 capsule by mouth Daily., Disp: , Rfl:     sertraline (ZOLOFT) 100 MG tablet, Take 2 tablets by mouth Daily., Disp: , Rfl:     spironolactone (ALDACTONE) 25 MG tablet, Take 1 tablet by mouth 2 (Two) Times a Day., Disp: , Rfl:     Ventolin  (90 Base) MCG/ACT inhaler, Inhale 2 puffs Every 4 (Four) Hours As Needed for Wheezing or Shortness of Air for up to 30 days., Disp: 18 g, Rfl: 5    cyproheptadine (PERIACTIN) 4 MG tablet, Take 1 tablet by mouth every night at bedtime. (Patient not taking: Reported on 11/11/2024), Disp: , Rfl:     divalproex (DEPAKOTE) 500 MG DR tablet, Take 1 tablet by mouth 2 (Two) Times a Day. (Patient not taking: Reported on 11/11/2024), Disp: 60 tablet, Rfl: 2    Rimegepant Sulfate (Nurtec) 75 MG tablet dispersible tablet, Take 1 tablet by mouth Daily As Needed (migraines). Take at onset of headache (Max of 75 mg/24 hours) (Patient not taking: Reported on 11/11/2024), Disp: 16 tablet,  "Rfl: 11     Objective     Physical Exam  Vital Signs:   WDWN, Alert, NAD.    HEENT:  PERRL, EOMI.  OP, nares clear, no sinus tenderness  Neck:  Supple, no JVD, no thyromegaly.  Lymph: no axillary, cervical, supraclavicular lymphadenopathy noted bilaterally  Chest: Diminished breath sounds bilaterally. No wheezes, rales, or rhonchi appreciated.  Normal work of breathing noted.  Patient is able speak full sentences without difficulty.   Patient is on 2 L of oxygen per minute via nasal cannula.   CV: RRR, no MGR, pulses 2+, equal.  Abd:  Soft, NT, ND, + BS, no HSM  EXT:  no clubbing, no cyanosis, no edema, no joint tenderness  Neuro:  A&Ox3, CN grossly intact, no focal deficits.  Skin: No rashes or lesions noted.    BP (!) 112/36 (BP Location: Left arm, Patient Position: Sitting, Cuff Size: Adult)   Pulse 70   Temp 97.7 °F (36.5 °C) (Temporal)   Resp 18   Ht 158.8 cm (62.52\")   Wt 90.7 kg (200 lb)   SpO2 95% Comment: 2L oxygen  BMI 35.97 kg/m²         Result Review :   I have reviewed my last office visit note.     Procedures:           Assessment and Plan      Assessment:  1. Very severe COPD, FEV1 of 13% predicted.  2. Restrictive lung disease.  3. Centrilobular emphysema.  4. Chronic hypoxic and hypercapnic respiratory failure: Patient is on home NIPPV machine and supplemental oxygen.   5. Tobacco abuse of cigarettes in remission. Not eligible for lung cancer screening as patient reports that she quit smoking in 2007.        Plan:  1.  Continue Pulmicort as prescribed and rinse mouth out after each use.  Patient is not able to take Brovana/LABA/LAMA therapy due to multiple allergies.  2.  Continue albuterol inhaler and DuoNeb nebulizer treatments as needed.  3.  Continue NIPPV machine with oxygen bled in at current settings at night and with naps and clean mask and tubing daily.  4.  Continue oxygen to keep SPO2 at 89% and above.  5.  Continue Singulair, Zyrtec, Flonase nasal spray, and Astelin nasal spray. " .  6.  Patient continues to decline referral for lung transplant evaluation.  7.  Vaccination status:  patient declines flu, pneumonia, and COVID-19 vaccinations.  Discussed with patient the benefits of vaccination including decreased risk of severe illness, hospitalization and death related to flu, pneumonia, and COVID-19.  Patient verbalized understanding.  Patient is advised to continue to follow CDC recommendations such as social distancing, wearing a mask, and washing hands for least 20 seconds.  8.  Smoking status: patient is a former cigarette smoker. Not eligible for lung cancer screening as patient reports that she quit smoking in 2007.  9.  Patient to call the office, 911, or go to the ER with new or worsening symptoms.  10.  Follow-up in 4 months, sooner if needed.          Follow Up   Return in about 4 months (around 3/11/2025).  Patient was given instructions and counseling regarding her condition or for health maintenance advice. Please see specific information pulled into the AVS if appropriate.

## 2024-11-11 ENCOUNTER — OFFICE VISIT (OUTPATIENT)
Dept: PULMONOLOGY | Facility: CLINIC | Age: 59
End: 2024-11-11
Payer: COMMERCIAL

## 2024-11-11 VITALS
HEART RATE: 70 BPM | WEIGHT: 200 LBS | SYSTOLIC BLOOD PRESSURE: 112 MMHG | RESPIRATION RATE: 18 BRPM | DIASTOLIC BLOOD PRESSURE: 36 MMHG | BODY MASS INDEX: 35.44 KG/M2 | OXYGEN SATURATION: 95 % | TEMPERATURE: 97.7 F | HEIGHT: 63 IN

## 2024-11-11 DIAGNOSIS — J96.12 CHRONIC RESPIRATORY FAILURE WITH HYPOXIA AND HYPERCAPNIA: Primary | ICD-10-CM

## 2024-11-11 DIAGNOSIS — J43.2 CENTRILOBULAR EMPHYSEMA: ICD-10-CM

## 2024-11-11 DIAGNOSIS — J98.4 RESTRICTIVE LUNG DISEASE: ICD-10-CM

## 2024-11-11 DIAGNOSIS — J44.9 CHRONIC OBSTRUCTIVE PULMONARY DISEASE, UNSPECIFIED COPD TYPE: ICD-10-CM

## 2024-11-11 DIAGNOSIS — F17.201 TOBACCO ABUSE, IN REMISSION: ICD-10-CM

## 2024-11-11 DIAGNOSIS — J96.11 CHRONIC RESPIRATORY FAILURE WITH HYPOXIA AND HYPERCAPNIA: Primary | ICD-10-CM

## 2024-11-11 PROCEDURE — 1159F MED LIST DOCD IN RCRD: CPT | Performed by: NURSE PRACTITIONER

## 2024-11-11 PROCEDURE — 99214 OFFICE O/P EST MOD 30 MIN: CPT | Performed by: NURSE PRACTITIONER

## 2024-11-11 PROCEDURE — 1160F RVW MEDS BY RX/DR IN RCRD: CPT | Performed by: NURSE PRACTITIONER

## 2024-11-11 RX ORDER — FLUTICASONE PROPIONATE 50 MCG
SPRAY, SUSPENSION (ML) NASAL
COMMUNITY
Start: 2024-11-01

## 2024-11-11 RX ORDER — ALBUTEROL SULFATE 0.83 MG/ML
SOLUTION RESPIRATORY (INHALATION)
COMMUNITY
Start: 2024-07-17 | End: 2024-11-11

## 2024-11-11 RX ORDER — BUDESONIDE 0.25 MG/2ML
0.25 INHALANT ORAL 2 TIMES DAILY
Qty: 120 ML | Refills: 5 | Status: SHIPPED | OUTPATIENT
Start: 2024-11-11 | End: 2024-12-11

## 2024-11-11 RX ORDER — ALBUTEROL SULFATE 90 UG/1
2 AEROSOL, METERED RESPIRATORY (INHALATION) EVERY 4 HOURS PRN
COMMUNITY
Start: 2024-10-07 | End: 2024-11-11 | Stop reason: SDUPTHER

## 2024-11-11 RX ORDER — IPRATROPIUM BROMIDE AND ALBUTEROL SULFATE 2.5; .5 MG/3ML; MG/3ML
3 SOLUTION RESPIRATORY (INHALATION) EVERY 6 HOURS PRN
Qty: 120 ML | Refills: 5 | Status: SHIPPED | OUTPATIENT
Start: 2024-11-11 | End: 2024-12-11

## 2024-11-11 RX ORDER — MONTELUKAST SODIUM 10 MG/1
10 TABLET ORAL NIGHTLY
Qty: 90 TABLET | Refills: 3 | Status: SHIPPED | OUTPATIENT
Start: 2024-11-11 | End: 2025-02-09

## 2024-11-11 RX ORDER — LANCETS 28 GAUGE
1 EACH MISCELLANEOUS 3 TIMES DAILY
COMMUNITY
Start: 2024-09-05

## 2024-11-11 RX ORDER — ALBUTEROL SULFATE 90 UG/1
2 AEROSOL, METERED RESPIRATORY (INHALATION) EVERY 4 HOURS PRN
Qty: 18 G | Refills: 5 | Status: SHIPPED | OUTPATIENT
Start: 2024-11-11 | End: 2024-12-11

## 2025-03-06 NOTE — PROGRESS NOTES
Primary Care Provider  Lizbeth Sims APRN     Referring Provider  No ref. provider found     Chief Complaint  Centrilobular emphysema, Cough, Shortness of Breath, Wheezing, Follow-up (4 month. ), and COPD    Subjective          History of Presenting Illness  Patient is a 59-year-old female, patient of Dr. Martinez who presents for management of very severe COPD and chronic hypoxic and hypercapnic respiratory failure who presents for a follow-up visit today.  Patient's brother is present with the patient in the office today.  Patient states that since last visit her breathing is at baseline. Patient states that she does get short of breath that is worse with exertion, moderate in severity, and improved with rest. Patient states that she is taking Pulmicort every day prescribed and uses albuterol inhaler and DuoNeb nebulizer treatments as needed. Patient is not able to take Brovana/LABA/LAMA therapy due to multiple allergies per patient report.  Patient is also taking Singulair, Zyrtec, Flonase nasal spray, and Astelin nasal spray for seasonal allergies.  Patient is on oxygen at 2-2.5 L/min via nasal cannula continuously and receives her oxygen through [facility].  Patient states that she is currently using NIPPV machine with oxygen bled in and receives her supplies through [facility] as well.  Patient denies any morning headaches or excessive daytime sleepiness. Patient denies fever, chills, night sweats, swollen glands in the head and neck, unintentional weight loss, hemoptysis, purulent sputum production, dysphagia, chest pain, palpitations, chest tightness, abdominal pain, nausea, vomiting, and diarrhea.  Patient also denies any myalgias, changes in sense of taste and/or smell, sore throat, any other coronavirus or flu-like symptoms.  Patient denies any leg swelling, orthopnea, paroxysmal nocturnal dyspnea.  Patient is able to perform activities of daily living.        Review of Systems     Family History    Problem Relation Age of Onset    Dementia Mother     Migraines Sister     Stroke Maternal Grandmother         Social History     Socioeconomic History    Marital status: Single   Tobacco Use    Smoking status: Former     Current packs/day: 0.00     Average packs/day: 0.5 packs/day for 27.0 years (13.5 ttl pk-yrs)     Types: Cigarettes     Start date:      Quit date:      Years since quittin.2     Passive exposure: Past    Smokeless tobacco: Never    Tobacco comments:     Patient does not know the start date.    Vaping Use    Vaping status: Never Used   Substance and Sexual Activity    Alcohol use: Not Currently    Drug use: Never    Sexual activity: Defer        Past Medical History:   Diagnosis Date    Anxiety     Asthma     CHF (congestive heart failure)     COPD (chronic obstructive pulmonary disease)     Diabetes mellitus     Disease of thyroid gland     Hyperlipidemia     Hypertension           There is no immunization history on file for this patient.    Allergies   Allergen Reactions    Levaquin [Levofloxacin] Shortness Of Breath and Swelling     Joint pain, very tired , swelling in legs and feet, low grade fevers.     Acetaminophen Unknown - High Severity    Adhesive Tape Unknown - High Severity    Baclofen Unknown - High Severity    Budesonide-Formoterol Fumarate Unknown - High Severity    Cephalexin Unknown - High Severity    Cephalosporins Unknown - High Severity    Codeine Unknown - High Severity    Fluticasone-Salmeterol Unknown - High Severity    Hydrocodone-Acetaminophen Unknown - High Severity    Hydroxyzine Hcl Unknown - High Severity    Iron Unknown - High Severity    Oxycodone Unknown - High Severity    Oxycodone-Acetaminophen Unknown - High Severity    Penicillins Unknown - High Severity    Prednisone Unknown - High Severity    Sulfamethoxazole Unknown - High Severity    Sulfamethoxazole-Trimethoprim Unknown - High Severity    Tiotropium Bromide Monohydrate Unknown - Low Severity     Trimethoprim Unknown - High Severity    Vancomycin Unknown - High Severity    Zafirlukast Unknown - High Severity    Nurtec [Rimegepant Sulfate] Palpitations     Chest pain    Ubrelvy [Ubrogepant] Palpitations     Patient also had chest pain.           Current Outpatient Medications:     albuterol sulfate  (90 Base) MCG/ACT inhaler, Inhale 2 puffs Every 4 (Four) Hours As Needed for Wheezing., Disp: 54 g, Rfl: 1    ALPRAZolam (XANAX) 1 MG tablet, Take 1 tablet by mouth 3 (Three) Times a Day As Needed. for anxiety, Disp: , Rfl:     aspirin 81 MG chewable tablet, Chew 1 tablet Daily., Disp: , Rfl:     atenolol (TENORMIN) 50 MG tablet, Take 1 tablet by mouth 2 (Two) Times a Day., Disp: , Rfl:     azelastine (ASTELIN) 0.1 % nasal spray, Administer 1 spray into the nostril(s) as directed by provider Daily As Needed., Disp: , Rfl:     budesonide (Pulmicort) 0.25 MG/2ML nebulizer solution, Take 2 mL by nebulization 2 (Two) Times a Day for 30 days. Rinse mouth out after each use, Disp: 120 mL, Rfl: 5    cetirizine (zyrTEC) 10 MG tablet, Take 1 tablet by mouth Daily. As needed., Disp: , Rfl:     Chest Congestion Relief 400 MG tablet, Take 1 tablet by mouth Every 12 (Twelve) Hours As Needed., Disp: , Rfl:     Cholecalciferol (Vitamin D) 50 MCG (2000 UT) capsule, Take 1 capsule by mouth Daily., Disp: , Rfl:     furosemide (LASIX) 20 MG tablet, Take 1 tablet by mouth Daily. As needed, Disp: , Rfl:     ipratropium-albuterol (DUO-NEB) 0.5-2.5 mg/3 ml nebulizer, Take 3 mL by nebulization Every 6 (Six) Hours As Needed for Wheezing or Shortness of Air for up to 30 days., Disp: 120 mL, Rfl: 5    levothyroxine (SYNTHROID, LEVOTHROID) 125 MCG tablet, Take 1 tablet by mouth Daily., Disp: , Rfl:     lisinopril (PRINIVIL,ZESTRIL) 10 MG tablet, Take 1 tablet by mouth 3 (Three) Times a Day As Needed (High Blood Pressure)., Disp: , Rfl:     montelukast (SINGULAIR) 10 MG tablet, Take 1 tablet by mouth Every Night for 90 days. As  needed., Disp: 90 tablet, Rfl: 3    multivitamin with minerals tablet tablet, Take 1 tablet by mouth Daily., Disp: , Rfl:     nitroglycerin (NITROSTAT) 0.4 MG SL tablet, Place 1 tablet under the tongue Every 5 (Five) Minutes As Needed., Disp: , Rfl:     O2 (OXYGEN), Inhale 3 L/min Daily., Disp: , Rfl:     Omega-3 Fatty Acids (fish oil) 1000 MG capsule capsule, Take 1 capsule by mouth 2 (Two) Times a Day., Disp: , Rfl:     ondansetron (ZOFRAN) 8 MG tablet, Take 1 tablet by mouth Every 12 (Twelve) Hours., Disp: , Rfl:     Probiotic Product (Florajen Digestion) capsule, Take 1 capsule by mouth Daily., Disp: , Rfl:     sertraline (ZOLOFT) 100 MG tablet, Take 2 tablets by mouth Daily., Disp: , Rfl:     spironolactone (ALDACTONE) 25 MG tablet, Take 1 tablet by mouth 2 (Two) Times a Day., Disp: , Rfl:     B-D ULTRAFINE III SHORT PEN 31G X 8 MM misc, USE TO INJECT BASAGLAR EVERY DAY, Disp: , Rfl:     cyproheptadine (PERIACTIN) 4 MG tablet, Take 1 tablet by mouth every night at bedtime. (Patient not taking: Reported on 8/19/2024), Disp: , Rfl:     divalproex (DEPAKOTE) 500 MG DR tablet, Take 1 tablet by mouth 2 (Two) Times a Day. (Patient not taking: Reported on 1/31/2024), Disp: 60 tablet, Rfl: 2    Erenumab-aooe (Aimovig) 140 MG/ML auto-injector, Inject 1 mL under the skin into the appropriate area as directed Every 30 (Thirty) Days. (Patient not taking: Reported on 3/17/2025), Disp: 1 mL, Rfl: 11    fluticasone (FLONASE) 50 MCG/ACT nasal spray, SHAKE LIQUID AND USE 1 SPRAY IN EACH NOSTRIL EVERY DAY (Patient not taking: Reported on 3/17/2025), Disp: , Rfl:     FREESTYLE LITE test strip, USE AS DIRECTED TO TEST BLOOD SUGAR TWICE DAILY, Disp: , Rfl:     Lancets (freestyle) lancets, 1 each by Other route 3 (Three) Times a Day., Disp: , Rfl:     Lantus SoloStar 100 UNIT/ML injection pen, Inject 13 Units under the skin into the appropriate area as directed Daily., Disp: , Rfl:     Rimegepant Sulfate (Nurtec) 75 MG tablet  "dispersible tablet, Take 1 tablet by mouth Daily As Needed (migraines). Take at onset of headache (Max of 75 mg/24 hours) (Patient not taking: Reported on 3/17/2025), Disp: 16 tablet, Rfl: 11     Objective     Physical Exam  Vital Signs:   WDWN, Alert, NAD.    HEENT:  PERRL, EOMI.  OP, nares clear, no sinus tenderness  Neck:  Supple, no JVD, no thyromegaly.  Lymph: no axillary, cervical, supraclavicular lymphadenopathy noted bilaterally  Chest:  Diminished breath sounds bilaterally. No wheezes, rales, or rhonchi appreciated.  Normal work of breathing noted.  Patient is able speak full sentences without difficulty.   Patient is on 2 L of oxygen per minute via nasal cannula.   CV: RRR, no MGR, pulses 2+, equal.  Abd:  Soft, NT, ND, + BS, no HSM  EXT:  no clubbing, no cyanosis, no edema, no joint tenderness  Neuro:  A&Ox3, CN grossly intact, no focal deficits.  Skin: No rashes or lesions noted.    /60 (BP Location: Right arm, Patient Position: Sitting, Cuff Size: Large Adult)   Pulse 77   Temp 98.2 °F (36.8 °C) (Oral)   Resp 16   Ht 158.8 cm (62.52\")   Wt 95 kg (209 lb 6.4 oz)   SpO2 90% Comment: 2.5 liters of oxygen.  BMI 37.67 kg/m²         Result Review :   I have reviewed my last office visit note.    Procedures:           Assessment and Plan      Assessment:  1. Very severe COPD, FEV1 of 13% predicted.  2. Restrictive lung disease.  3. Centrilobular emphysema.  4. Chronic hypoxic and hypercapnic respiratory failure: Patient is on home NIPPV machine and supplemental oxygen.   5. Tobacco abuse of cigarettes in remission. Not eligible for lung cancer screening as patient reports that she quit smoking in 2007.        Plan:  1.  Continue Pulmicort as prescribed and rinse mouth out after each use.  Patient is not able to take Brovana/LABA/LAMA therapy due to multiple allergies.  2.  Continue albuterol inhaler and DuoNeb nebulizer treatments as needed.  3.  Continue NIPPV machine with oxygen bled in at " current settings at night and with naps and clean mask and tubing daily.  4.  Continue oxygen to keep SPO2 at 89% and above.  5.  Continue Singulair, Zyrtec, Flonase nasal spray, and Astelin nasal spray. .  6.  Patient continues to decline referral for lung transplant evaluation.  7.  Vaccination status:  patient declines flu, pneumonia, and COVID-19 vaccinations.  Discussed with patient the benefits of vaccination including decreased risk of severe illness, hospitalization and death related to flu, pneumonia, and COVID-19.  Patient verbalized understanding.  Patient is advised to continue to follow CDC recommendations such as social distancing, wearing a mask, and washing hands for least 20 seconds.  8.  Smoking status: patient is a former cigarette smoker. Not eligible for lung cancer screening as patient reports that she quit smoking in 2007.  9.  Patient to call the office, 911, or go to the ER with new or worsening symptoms.  10.  Follow-up in 6 months, sooner if needed.           Follow Up   Return in about 6 months (around 9/17/2025).  Patient was given instructions and counseling regarding her condition or for health maintenance advice. Please see specific information pulled into the AVS if appropriate.

## 2025-03-11 RX ORDER — ALBUTEROL SULFATE 90 UG/1
2 INHALANT RESPIRATORY (INHALATION) EVERY 4 HOURS PRN
Qty: 54 G | Refills: 5 | Status: SHIPPED | OUTPATIENT
Start: 2025-03-11 | End: 2025-03-17 | Stop reason: SDUPTHER

## 2025-03-17 ENCOUNTER — OFFICE VISIT (OUTPATIENT)
Dept: PULMONOLOGY | Facility: CLINIC | Age: 60
End: 2025-03-17
Payer: COMMERCIAL

## 2025-03-17 VITALS
SYSTOLIC BLOOD PRESSURE: 105 MMHG | OXYGEN SATURATION: 90 % | DIASTOLIC BLOOD PRESSURE: 60 MMHG | HEIGHT: 63 IN | TEMPERATURE: 98.2 F | WEIGHT: 209.4 LBS | RESPIRATION RATE: 16 BRPM | HEART RATE: 77 BPM | BODY MASS INDEX: 37.1 KG/M2

## 2025-03-17 DIAGNOSIS — J96.12 CHRONIC RESPIRATORY FAILURE WITH HYPOXIA AND HYPERCAPNIA: ICD-10-CM

## 2025-03-17 DIAGNOSIS — F17.201 TOBACCO ABUSE, IN REMISSION: Primary | ICD-10-CM

## 2025-03-17 DIAGNOSIS — J96.11 CHRONIC RESPIRATORY FAILURE WITH HYPOXIA AND HYPERCAPNIA: ICD-10-CM

## 2025-03-17 DIAGNOSIS — J98.4 RESTRICTIVE LUNG DISEASE: ICD-10-CM

## 2025-03-17 DIAGNOSIS — J43.2 CENTRILOBULAR EMPHYSEMA: ICD-10-CM

## 2025-03-17 DIAGNOSIS — J44.9 CHRONIC OBSTRUCTIVE PULMONARY DISEASE, UNSPECIFIED COPD TYPE: ICD-10-CM

## 2025-03-17 PROCEDURE — 99214 OFFICE O/P EST MOD 30 MIN: CPT | Performed by: NURSE PRACTITIONER

## 2025-03-17 PROCEDURE — 1159F MED LIST DOCD IN RCRD: CPT | Performed by: NURSE PRACTITIONER

## 2025-03-17 PROCEDURE — 1160F RVW MEDS BY RX/DR IN RCRD: CPT | Performed by: NURSE PRACTITIONER

## 2025-03-17 RX ORDER — IPRATROPIUM BROMIDE AND ALBUTEROL SULFATE 2.5; .5 MG/3ML; MG/3ML
3 SOLUTION RESPIRATORY (INHALATION) EVERY 6 HOURS PRN
Qty: 120 ML | Refills: 5 | Status: SHIPPED | OUTPATIENT
Start: 2025-03-17 | End: 2025-04-16

## 2025-03-17 RX ORDER — BUDESONIDE 0.25 MG/2ML
0.25 INHALANT ORAL 2 TIMES DAILY
Qty: 120 ML | Refills: 5 | Status: SHIPPED | OUTPATIENT
Start: 2025-03-17 | End: 2025-04-16

## 2025-03-17 RX ORDER — ALBUTEROL SULFATE 90 UG/1
2 INHALANT RESPIRATORY (INHALATION) EVERY 4 HOURS PRN
Qty: 54 G | Refills: 1 | Status: SHIPPED | OUTPATIENT
Start: 2025-03-17

## 2025-04-22 ENCOUNTER — TELEPHONE (OUTPATIENT)
Dept: PULMONOLOGY | Facility: CLINIC | Age: 60
End: 2025-04-22

## 2025-04-22 NOTE — TELEPHONE ENCOUNTER
"@[employer]     \"LVM for patient letting her know office note will be mailed to her.\"                 "

## 2025-04-22 NOTE — TELEPHONE ENCOUNTER
Caller: Nova Manuel    Relationship to patient: Self    Best call back number: [phone] (home)       Patient is needing: CAN A COPY OF VISIT REPORT FROM 3/17 BE MAILED TO PT? SHE LOST COPY SHE GOT AT VISIT. PLEASE CALL PT TO ADVISE IF THIS CAN BE DONE. SHE DOES NOT USE [employer]

## 2025-05-21 NOTE — TELEPHONE ENCOUNTER
I will call Mr. Peace back and make him aware of the test being ordered.   
Mr. Watson called from [facility] and asked if we have a PFT and/or ABG's on this patient for the [employer] order that I sent in today. I don't see either test in this patients chart, so he asked if Dr. Mcqueen could order one or the other to help qualify this patent for her equipment.   Please advise.    
05-21    141  |  106  |  25[H]  ----------------------------<  98  4.7   |  29  |  0.95    Ca    9.6      21 May 2025 07:45  Phos  2.8     05-20  Mg     2.0     05-20    TPro  7.3  /  Alb  3.7  /  TBili  0.3  /  DBili  x   /  AST  21  /  ALT  25  /  AlkPhos  94  05-20

## 2025-08-04 ENCOUNTER — TELEPHONE (OUTPATIENT)
Dept: PULMONOLOGY | Facility: CLINIC | Age: 60
End: 2025-08-04
Payer: COMMERCIAL

## 2025-08-06 DIAGNOSIS — J96.11 CHRONIC RESPIRATORY FAILURE WITH HYPOXIA AND HYPERCAPNIA: Primary | ICD-10-CM

## 2025-08-06 DIAGNOSIS — J44.9 CHRONIC OBSTRUCTIVE PULMONARY DISEASE, UNSPECIFIED COPD TYPE: ICD-10-CM

## 2025-08-06 DIAGNOSIS — J96.12 CHRONIC RESPIRATORY FAILURE WITH HYPOXIA AND HYPERCAPNIA: Primary | ICD-10-CM

## 2025-08-13 DIAGNOSIS — J96.12 CHRONIC RESPIRATORY FAILURE WITH HYPOXIA AND HYPERCAPNIA: Primary | ICD-10-CM

## 2025-08-13 DIAGNOSIS — J96.11 CHRONIC RESPIRATORY FAILURE WITH HYPOXIA AND HYPERCAPNIA: Primary | ICD-10-CM

## 2025-08-13 DIAGNOSIS — J44.9 CHRONIC OBSTRUCTIVE PULMONARY DISEASE, UNSPECIFIED COPD TYPE: ICD-10-CM
